# Patient Record
Sex: FEMALE | Race: WHITE | NOT HISPANIC OR LATINO | Employment: PART TIME | URBAN - METROPOLITAN AREA
[De-identification: names, ages, dates, MRNs, and addresses within clinical notes are randomized per-mention and may not be internally consistent; named-entity substitution may affect disease eponyms.]

---

## 2017-07-14 ENCOUNTER — OFFICE VISIT (OUTPATIENT)
Dept: MEDICAL GROUP | Facility: MEDICAL CENTER | Age: 57
End: 2017-07-14
Attending: INTERNAL MEDICINE
Payer: MEDICAID

## 2017-07-14 VITALS
DIASTOLIC BLOOD PRESSURE: 88 MMHG | TEMPERATURE: 97.9 F | WEIGHT: 248 LBS | HEIGHT: 66 IN | BODY MASS INDEX: 39.86 KG/M2 | RESPIRATION RATE: 16 BRPM | OXYGEN SATURATION: 92 % | SYSTOLIC BLOOD PRESSURE: 128 MMHG | HEART RATE: 80 BPM

## 2017-07-14 DIAGNOSIS — Z12.31 SCREENING MAMMOGRAM, ENCOUNTER FOR: ICD-10-CM

## 2017-07-14 DIAGNOSIS — E66.01 MORBID OBESITY WITH BMI OF 40.0-44.9, ADULT (HCC): ICD-10-CM

## 2017-07-14 DIAGNOSIS — Z13.1 SCREENING FOR DIABETES MELLITUS: ICD-10-CM

## 2017-07-14 DIAGNOSIS — Z13.220 SCREENING, LIPID: ICD-10-CM

## 2017-07-14 DIAGNOSIS — Z23 NEED FOR VACCINATION: ICD-10-CM

## 2017-07-14 DIAGNOSIS — F12.90 MARIJUANA USE: ICD-10-CM

## 2017-07-14 DIAGNOSIS — R06.83 SNORING: ICD-10-CM

## 2017-07-14 DIAGNOSIS — Z12.11 SCREEN FOR COLON CANCER: ICD-10-CM

## 2017-07-14 DIAGNOSIS — F33.1 MODERATE EPISODE OF RECURRENT MAJOR DEPRESSIVE DISORDER (HCC): ICD-10-CM

## 2017-07-14 PROCEDURE — 99214 OFFICE O/P EST MOD 30 MIN: CPT | Performed by: INTERNAL MEDICINE

## 2017-07-14 PROCEDURE — 99204 OFFICE O/P NEW MOD 45 MIN: CPT | Performed by: INTERNAL MEDICINE

## 2017-07-14 ASSESSMENT — PATIENT HEALTH QUESTIONNAIRE - PHQ9: CLINICAL INTERPRETATION OF PHQ2 SCORE: 0

## 2017-07-14 ASSESSMENT — PAIN SCALES - GENERAL: PAINLEVEL: 2=MINIMAL-SLIGHT

## 2017-07-14 NOTE — MR AVS SNAPSHOT
"        Laverne Gillespiey   2017 8:30 AM   Office Visit   MRN: [id number]    Department:  [facility]   Dept Phone:  [phone]    Description:  Female : 1960   Provider:  My Escalante M.D.           Reason for Visit     Establish Care refered over by bariatrics      Allergies as of 2017     No Known Allergies      You were diagnosed with     Morbid obesity with BMI of 40.0-44.9, adult (CMS-HCC)   [658275]       Snoring   [500993]       Need for vaccination   [201934]       Marijuana use   [859541]       Moderate episode of recurrent major depressive disorder (CMS-HCC)   [8275238]       Screening for diabetes mellitus   [V77.1.ICD-9-CM]       Screening, lipid   [071266]       Screening mammogram, encounter for   [2496942]       Screen for colon cancer   [038739]         Vital Signs     Blood Pressure Pulse Temperature Respirations Height Weight    128/88 mmHg 80 36.6 °C (97.9 °F) 16 1.676 m (5' 5.98\") 112.492 kg (248 lb)    Body Mass Index Oxygen Saturation Breastfeeding? Smoking Status          40.05 kg/m2 92% No Former Smoker        Basic Information     Date Of Birth Sex Race Ethnicity Preferred Language    1960 Female White Non- English      Your appointments     Aug 17, 2017  9:10 AM   Established Patient with My Escalante M.D.   [facility] ([facility])    [address] [place] [id number]   [phone]           You will be receiving a confirmation call a few days before your appointment from our automated call confirmation system.              Problem List              ICD-10-CM Priority Class Noted - Resolved    Morbid obesity with BMI of 40.0-44.9, adult ([facility]) E66.01, Z68.41   2017 - Present    Snoring R06.83   2017 - Present    Marijuana use F12.10   2017 - Present    Moderate episode of recurrent major depressive disorder (CMS-HCC) F33.1   2017 - Present      Health Maintenance        Date Due Completion Dates    IMM " DTaP/Tdap/Td Vaccine (1 - Tdap) 11/9/1981 ---    PAP SMEAR 11/9/1983 ---    MAMMOGRAM 11/9/2002 ---    COLONOSCOPY 11/9/2012 ---    IMM INFLUENZA (1) 9/1/2017 ---            Current Immunizations     Tdap Vaccine  Incomplete      Below and/or attached are the medications your provider expects you to take. Review all of your home medications and newly ordered medications with your provider and/or pharmacist. Follow medication instructions as directed by your provider and/or pharmacist. Please keep your medication list with you and share with your provider. Update the information when medications are discontinued, doses are changed, or new medications (including over-the-counter products) are added; and carry medication information at all times in the event of emergency situations     Allergies:  No Known Allergies          Medications  Valid as of: July 14, 2017 -  9:22 AM    Generic Name Brand Name Tablet Size Instructions for use    Sertraline HCl (Tab) ZOLOFT 50 MG Take 1/2 tablet daily for one week then increase to one tablet daily        .                 Medicines prescribed today were sent to:     [facility] [address] [place] [address] (S), [place] - [address]    [facility] [place] ([place]) [place] [address]    Phone: [phone] Fax: [phone]    Open 24 Hours?: No      Medication refill instructions:       If your prescription bottle indicates you have medication refills left, it is not necessary to call your provider’s office. Please contact your pharmacy and they will refill your medication.    If your prescription bottle indicates you do not have any refills left, you may request refills at any time through one of the following ways: The online [employer] Advisor system (except Urgent Care), by calling your provider’s office, or by asking your pharmacy to contact your provider’s office with a refill request. Medication refills are processed only during regular business hours and may not be available until the next business day.  Your provider may request additional information or to have a follow-up visit with you prior to refilling your medication.   *Please Note: Medication refills are assigned a new Rx number when refilled electronically. Your pharmacy may indicate that no refills were authorized even though a new prescription for the same medication is available at the pharmacy. Please request the medicine by name with the pharmacy before contacting your provider for a refill.        Your To Do List     Future Labs/Procedures Complete By Expires    COMP METABOLIC PANEL  As directed 7/15/2018    HEMOGLOBIN A1C  As directed 7/15/2018    LIPID PROFILE  As directed 7/15/2018    MA-SCREEN MAMMO W/CAD-BILAT  As directed 7/14/2018    OCCULT BLOOD FECES IMMUNOASSAY  As directed 7/14/2018      Referral     A referral request has been sent to our patient care coordination department. Please allow 3-5 business days for us to process this request and contact you either by phone or mail. If you do not hear from us by the 5th business day, please call us at [phone].           Narrable Access Code: [email or web address]  Expires: 8/10/2017 10:25 AM    Narrable  A secure, online tool to manage your health information     [employer]’s Narrable® is a secure, online tool that connects you to your personalized health information from the privacy of your home -- day or night - making it very easy for you to manage your healthcare. Once the activation process is completed, you can even access your medical information using the Narrable landon, which is available for free in the Apple Landon store or Google Play store.     Narrable provides the following levels of access (as shown below):   My Chart Features   [facility] Doctor [facility]  Specialists [facility]  Urgent  Care Non-[facility]  Primary Care  Doctor   Email your healthcare team securely and privately 24/7 X X X    Manage appointments: schedule your next appointment; view details of past/upcoming  appointments X      Request prescription refills. X      View recent personal medical records, including lab and immunizations X X X X   View health record, including health history, allergies, medications X X X X   Read reports about your outpatient visits, procedures, consult and ER notes X X X X   See your discharge summary, which is a recap of your hospital and/or ER visit that includes your diagnosis, lab results, and care plan. X X       How to register for Pawzii:  1. Go to  [email or web address].  2. Click on the Sign Up Now box, which takes you to the New Member Sign Up page. You will need to provide the following information:  a. Enter your Pawzii Access Code exactly as it appears at the top of this page. (You will not need to use this code after you’ve completed the sign-up process. If you do not sign up before the expiration date, you must request a new code.)   b. Enter your date of birth.   c. Enter your home email address.   d. Click Submit, and follow the next screen’s instructions.  3. Create a Pawzii ID. This will be your Pawzii login ID and cannot be changed, so think of one that is secure and easy to remember.  4. Create a Pawzii password. You can change your password at any time.  5. Enter your Password Reset Question and Answer. This can be used at a later time if you forget your password.   6. Enter your e-mail address. This allows you to receive e-mail notifications when new information is available in Pawzii.  7. Click Sign Up. You can now view your health information.    For assistance activating your Pawzii account, call [phone]

## 2017-07-14 NOTE — ASSESSMENT & PLAN NOTE
Patient reports daily marijuana use by smoking and with adequate pulse. 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

## 2017-07-14 NOTE — ASSESSMENT & PLAN NOTE
Patient reports extensive snoring at night. She denies orthopnea. She reports waking up feeling unrefreshed and increased fatigue throughout the day. She also reports difficulty sleeping at night. She has occasional morning headaches. She has never had a sleep study, but her mother and her brother both have JANNY.

## 2017-07-14 NOTE — PROGRESS NOTES
Laverne Riojas is a 56 y.o. female here for depression, weight loss, evaluation for bariatric surgery, establish care    HPI:  No previous PCP  Morbid obesity with BMI of 40.0-44.9, adult ([facility])  Patient reports she is currently being seen by Dr. Kent for evaluation for gastric sleeve. She states she has struggled with weight her entire life. She is currently trying to lose weight by dieting but does report binge eating when she is feeling depressed. She has also tried human growth hormone which she was applying topically to help with weight loss. However, when she was using this, she started to develop breast tenderness and had a very light menstrual cycle when she had not had one in 3 years, so she stopped using it. She is not currently doing anything for exercise. States that she had issues with plantar fasciitis in the past which has prevented her from resuming any high impact exercise. We discussed cycling and pool exercises.    Moderate episode of recurrent major depressive disorder (CMS-[facility])  Patient reports suffering from depression for many years. She believes she was taking an antidepressant in 1998 but cannot remember what it was. She states she stopped taking it because she didn't feel she needed it anymore. She has not been treated for depression since then. She has a prescription in her chart for Lexapro from 2014 but states she never picked this up. She does not currently see a therapist. She complains of daily fatigue, insomnia, increased eating and binge eating related to depression, depressed mood, anhedonia, and crying easily. She denies suicidal ideation.    Snoring  Patient reports extensive snoring at night. She denies orthopnea. She reports waking up feeling unrefreshed and increased fatigue throughout the day. She also reports difficulty sleeping at night. She has occasional morning headaches. She has never had a sleep study, but her mother and her brother both have JANNY.    Marijuana  "use  Patient reports daily marijuana use by smoking and with adequate pulse. 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      Current medicines (including changes today)  Current Outpatient Prescriptions   Medication Sig Dispense Refill   • sertraline (ZOLOFT) 50 MG Tab Take 1/2 tablet daily for one week then increase to one tablet daily 30 Tab 1     No current facility-administered medications for this visit.     She  has a past medical history of Depression and Obesity.  She  has past surgical history that includes tonsillectomy.  Social History   Substance Use Topics   • Smoking status: Former Smoker -- 0.25 packs/day for 25 years     Types: Cigarettes     Quit date: 07/14/2002   • Smokeless tobacco: Never Used   • Alcohol Use: No     Social History     Social History Narrative     Family History   Problem Relation Age of Onset   • Arthritis Mother    • Stroke Mother    • Sleep Apnea Mother    • Sleep Apnea Brother    • Cancer Maternal Uncle      ROS  As above in history of present illness  All other systems reviewed and are negative     Objective:     Blood pressure 128/88, pulse 80, temperature 36.6 °C (97.9 °F), resp. rate 16, height 1.676 m (5' 5.98\"), weight 112.492 kg (248 lb), SpO2 92 %, not currently breastfeeding. Body mass index is 40.05 kg/(m^2).  Physical Exam:    Constitutional: Alert, no distress.  Skin: Warm, dry, good turgor, no rashes in visible areas.  Eye: Equal, round and reactive, conjunctiva clear, lids normal.  ENMT: Lips without lesions, good dentition, oropharynx clear.  Neck: Trachea midline, no masses, no thyromegaly. No cervical or supraclavicular lymphadenopathy.  Respiratory: Unlabored respiratory effort, lungs clear to auscultation, no wheezes, no ronchi.  Cardiovascular: Normal S1, S2, no murmur, no edema.  Abdomen: Soft, obese, non-tender, no masses, no " hepatosplenomegaly.  Psych: Alert and oriented x3, normal affect and mood is depressed.      Assessment and Plan:   The following treatment plan was discussed    1. Morbid obesity with BMI of 40.0-44.9, adult (CMS-HCC)  Patient is currently seeing Dr. Kent and is being considered for a gastric sleeve. We discussed continued healthy diet as well as restarting an exercise program which would include swimming or cycling so that she does not put a lot of pressure on her feet which are still painful from her plantar fasciitis. She will continue to follow-up with Dr. Kent regarding the specific qualifications she needs for bariatric surgery.  -Recommended start low impact exercise regimen  -Continued follow-up with bariatric surgery  - Patient identified as having weight management issue.  Appropriate orders and counseling given.  - COMP METABOLIC PANEL; Future    2. Snoring  Patient has some features characteristic of obstructive sleep apnea as well as obesity with increased neck circumference. I have placed a referral today for a sleep study for further diagnosis.  - REFERRAL TO SLEEP STUDIES    3. Marijuana use  Daily marijuana use by both smoking and edibles.  Patient is not interested in quitting at this time.    4. Moderate episode of recurrent major depressive disorder (CMS-HCC)  Patient is interested in trying both the medication and therapy. We will start her on Zoloft and psychology referral placed today. Follow-up in 4 weeks.  -Start Zoloft 50 mg one half tablet daily for one week then increase to one full tablet daily  - REFERRAL TO PSYCHOLOGY  -Follow up in 4 weeks    5. Need for vaccination  - Tdap =>8yo IM    6. Screening for diabetes mellitus  - HEMOGLOBIN A1C; Future    7. Screening, lipid  - LIPID PROFILE; Future    8. Screening mammogram, encounter for  - MA-SCREEN MAMMO W/CAD-BILAT; Future    9. Screen for colon cancer  - OCCULT BLOOD FECES IMMUNOASSAY; Future        Followup: Return in about 4 weeks  (around 8/11/2017) for depression, labs.

## 2017-07-14 NOTE — ASSESSMENT & PLAN NOTE
Patient reports suffering from depression for many years. She believes she was taking an antidepressant in 1998 but cannot remember what it was. She states she stopped taking it because she didn't feel she needed it anymore. She has not been treated for depression since then. She has a prescription in her chart for Lexapro from 2014 but states she never picked this up. She does not currently see a therapist. She complains of daily fatigue, insomnia, increased eating and binge eating related to depression, depressed mood, anhedonia, and crying easily. She denies suicidal ideation.

## 2017-07-18 ENCOUNTER — HOSPITAL ENCOUNTER (OUTPATIENT)
Facility: MEDICAL CENTER | Age: 57
End: 2017-07-18
Attending: INTERNAL MEDICINE
Payer: MEDICAID

## 2017-07-18 PROCEDURE — 82274 ASSAY TEST FOR BLOOD FECAL: CPT

## 2017-07-20 ENCOUNTER — HOSPITAL ENCOUNTER (OUTPATIENT)
Dept: RADIOLOGY | Facility: MEDICAL CENTER | Age: 57
End: 2017-07-20
Attending: INTERNAL MEDICINE
Payer: MEDICAID

## 2017-07-20 DIAGNOSIS — Z12.31 SCREENING MAMMOGRAM, ENCOUNTER FOR: ICD-10-CM

## 2017-07-20 LAB — HEMOCCULT STL QL IA: NEGATIVE

## 2017-07-20 PROCEDURE — G0202 SCR MAMMO BI INCL CAD: HCPCS

## 2017-07-21 ENCOUNTER — TELEPHONE (OUTPATIENT)
Dept: MEDICAL GROUP | Facility: MEDICAL CENTER | Age: 57
End: 2017-07-21

## 2018-08-14 ENCOUNTER — OFFICE VISIT (OUTPATIENT)
Dept: URGENT CARE | Facility: CLINIC | Age: 58
End: 2018-08-14

## 2018-08-14 VITALS
RESPIRATION RATE: 16 BRPM | HEIGHT: 65 IN | HEART RATE: 68 BPM | TEMPERATURE: 97.5 F | BODY MASS INDEX: 41.32 KG/M2 | OXYGEN SATURATION: 93 % | WEIGHT: 248 LBS | SYSTOLIC BLOOD PRESSURE: 110 MMHG | DIASTOLIC BLOOD PRESSURE: 80 MMHG

## 2018-08-14 DIAGNOSIS — D36.7 DERMOID CYST OF NOSE: ICD-10-CM

## 2018-08-14 PROCEDURE — 99203 OFFICE O/P NEW LOW 30 MIN: CPT | Performed by: NURSE PRACTITIONER

## 2018-08-14 RX ORDER — CEPHALEXIN 500 MG/1
500 CAPSULE ORAL 4 TIMES DAILY
COMMUNITY
End: 2019-06-12

## 2018-08-14 NOTE — PROGRESS NOTES
"Subjective:      Laverne Riojas is a 57 y.o. female who presents with Wound Infection (staph infection in nose, started taking amoxicillan 500 mg  antibiotics on sunday, but she fills it's not helping, got prescribed cephalexin took first dose this morning, needs doctors note for work )            This is a new problem. Pt reports she developed a cyst inside her left nare 4 days ago. She reports she was started on keflex from a  in [place]. She has been taking the medication for about 1.5 days but does not feel a lot of improvement. She wants to make sure this is the proper antibiotic. She denies any fever. Reports a small amount of drainage from the area.        Review of Systems   HENT:        Cyst in left nare   All other systems reviewed and are negative.    Past Medical History:   Diagnosis Date   • Depression    • Obesity       Past Surgical History:   Procedure Laterality Date   • TONSILLECTOMY        Social History     Social History   • Marital status: Single     Spouse name: N/A   • Number of children: N/A   • Years of education: N/A     Occupational History   • Not on file.     Social History Main Topics   • Smoking status: Former Smoker     Packs/day: 0.25     Years: 25.00     Types: Cigarettes     Quit date: 7/14/2002   • Smokeless tobacco: Never Used   • Alcohol use No   • Drug use: Yes     Types: Marijuana      Comment: smoking and edibles daily for pain   • Sexual activity: Not Currently     Partners: Male     Birth control/ protection: Post-Menopausal     Other Topics Concern   • Not on file     Social History Narrative   • No narrative on file          Objective:     /80   Pulse 68   Temp 36.4 °C (97.5 °F)   Resp 16   Ht 1.651 m (5' 5\")   Wt 112.5 kg (248 lb)   SpO2 93%   BMI 41.27 kg/m²      Physical Exam   Constitutional: She is oriented to person, place, and time. Vital signs are normal. She appears well-developed and well-nourished.   HENT:   Head: Normocephalic and atraumatic.  "   Nose:       Eyes: Pupils are equal, round, and reactive to light. EOM are normal.   Neck: Normal range of motion.   Cardiovascular: Normal rate and regular rhythm.    Pulmonary/Chest: Effort normal.   Musculoskeletal: Normal range of motion.   Neurological: She is alert and oriented to person, place, and time.   Skin: Skin is warm and dry. Capillary refill takes less than 2 seconds.   Psychiatric: She has a normal mood and affect. Her speech is normal and behavior is normal. Thought content normal.   Vitals reviewed.              Assessment/Plan:     1. Dermoid cyst of nose    Advised pt she is on the correct ABX. Keflex 500 mg QID x 10 days  In about 2 days she should start seeing significant improvement in the area, if she does not, she is advised to RTC for potentially a stronger antibiotic  She understands  Warm moist compresses TID  Tylenol and ibuprofen as needed for pain  Work note provided  Supportive care, differential diagnoses, and indications for immediate follow-up discussed with patient.    Pathogenesis of diagnosis discussed including typical length and natural progression.      Instructed to return to  or nearest emergency department if symptoms fail to improve, for any change in condition, further concerns, or new concerning symptoms.  Patient states understanding of the plan of care and discharge instructions.

## 2018-08-14 NOTE — LETTER
August 14, 2018         Patient: Laverne Riojas   YOB: 1960   Date of Visit: 8/14/2018           To Whom it May Concern:    Laverne Riojas was seen in my clinic on 8/14/2018. Please excuse her from work 8/14/18-8/15/18.      Sincerely,           FRANCOIS Santillan.  Electronically Signed

## 2019-06-12 ENCOUNTER — OFFICE VISIT (OUTPATIENT)
Dept: URGENT CARE | Facility: CLINIC | Age: 59
End: 2019-06-12
Payer: COMMERCIAL

## 2019-06-12 VITALS
HEIGHT: 65 IN | WEIGHT: 224 LBS | OXYGEN SATURATION: 94 % | BODY MASS INDEX: 37.32 KG/M2 | SYSTOLIC BLOOD PRESSURE: 118 MMHG | TEMPERATURE: 98.4 F | HEART RATE: 70 BPM | DIASTOLIC BLOOD PRESSURE: 68 MMHG | RESPIRATION RATE: 18 BRPM
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
## 2019-06-12 DIAGNOSIS — M43.6 ACUTE MUSCLE STIFFNESS OF NECK: ICD-10-CM

## 2019-06-12 PROCEDURE — 99214 OFFICE O/P EST MOD 30 MIN: CPT | Performed by: PHYSICIAN ASSISTANT

## 2019-06-12 RX ORDER — CYCLOBENZAPRINE HCL 5 MG
5-10 TABLET ORAL 3 TIMES DAILY PRN
Qty: 30 TAB | Refills: 0 | Status: SHIPPED | OUTPATIENT
Start: 2019-06-12 | End: 2020-08-20

## 2019-06-12 ASSESSMENT — ENCOUNTER SYMPTOMS
CHILLS: 0
NECK PAIN: 1
DIZZINESS: 0
VISUAL CHANGE: 0
FEVER: 0
SHORTNESS OF BREATH: 0
ABDOMINAL PAIN: 0
NAUSEA: 0
DIARRHEA: 0
VOMITING: 0
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
## 2020-01-07 ENCOUNTER — OFFICE VISIT (OUTPATIENT)
Dept: URGENT CARE | Facility: CLINIC | Age: 60
End: 2020-01-07
Payer: COMMERCIAL

## 2020-01-07 VITALS
DIASTOLIC BLOOD PRESSURE: 84 MMHG | OXYGEN SATURATION: 94 % | WEIGHT: 220 LBS | TEMPERATURE: 99.3 F | BODY MASS INDEX: 36.61 KG/M2 | RESPIRATION RATE: 16 BRPM | HEART RATE: 74 BPM | SYSTOLIC BLOOD PRESSURE: 132 MMHG

## 2020-01-07 DIAGNOSIS — J01.00 ACUTE MAXILLARY SINUSITIS, RECURRENCE NOT SPECIFIED: ICD-10-CM

## 2020-01-07 PROCEDURE — 99204 OFFICE O/P NEW MOD 45 MIN: CPT | Performed by: FAMILY MEDICINE

## 2020-01-07 RX ORDER — AMOXICILLIN AND CLAVULANATE POTASSIUM 875; 125 MG/1; MG/1
1 TABLET, FILM COATED ORAL 2 TIMES DAILY
Qty: 14 TAB | Refills: 0 | Status: SHIPPED | OUTPATIENT
Start: 2020-01-07 | End: 2020-01-14

## 2020-05-20 ENCOUNTER — HOSPITAL ENCOUNTER (OUTPATIENT)
Facility: MEDICAL CENTER | Age: 60
End: 2020-05-20
Payer: COMMERCIAL

## 2020-05-23 LAB
SARS-COV-2 RNA SPEC QL NAA+PROBE: NOT DETECTED
SPECIMEN SOURCE: NORMAL

## 2020-08-20 ENCOUNTER — HOSPITAL ENCOUNTER (EMERGENCY)
Facility: MEDICAL CENTER | Age: 60
End: 2020-08-20
Attending: EMERGENCY MEDICINE
Payer: COMMERCIAL

## 2020-08-20 ENCOUNTER — APPOINTMENT (OUTPATIENT)
Dept: RADIOLOGY | Facility: MEDICAL CENTER | Age: 60
End: 2020-08-20
Attending: EMERGENCY MEDICINE
Payer: COMMERCIAL

## 2020-08-20 VITALS
OXYGEN SATURATION: 98 % | SYSTOLIC BLOOD PRESSURE: 117 MMHG | HEART RATE: 73 BPM | BODY MASS INDEX: 37.82 KG/M2 | DIASTOLIC BLOOD PRESSURE: 59 MMHG | WEIGHT: 227 LBS | HEIGHT: 65 IN | RESPIRATION RATE: 16 BRPM | TEMPERATURE: 97.5 F

## 2020-08-20 DIAGNOSIS — R55 VASOVAGAL SYNCOPE: ICD-10-CM

## 2020-08-20 DIAGNOSIS — S93.402A SPRAIN OF LEFT ANKLE, UNSPECIFIED LIGAMENT, INITIAL ENCOUNTER: ICD-10-CM

## 2020-08-20 LAB
ALBUMIN SERPL BCP-MCNC: 4 G/DL (ref 3.2–4.9)
ALBUMIN/GLOB SERPL: 1.6 G/DL
ALP SERPL-CCNC: 50 U/L (ref 30–99)
ALT SERPL-CCNC: 16 U/L (ref 2–50)
ANION GAP SERPL CALC-SCNC: 11 MMOL/L (ref 7–16)
APTT PPP: 21.4 SEC (ref 24.7–36)
AST SERPL-CCNC: 14 U/L (ref 12–45)
BASOPHILS # BLD AUTO: 0.5 % (ref 0–1.8)
BASOPHILS # BLD: 0.05 K/UL (ref 0–0.12)
BILIRUB SERPL-MCNC: 0.2 MG/DL (ref 0.1–1.5)
BUN SERPL-MCNC: 13 MG/DL (ref 8–22)
CALCIUM SERPL-MCNC: 9.4 MG/DL (ref 8.5–10.5)
CHLORIDE SERPL-SCNC: 105 MMOL/L (ref 96–112)
CO2 SERPL-SCNC: 24 MMOL/L (ref 20–33)
CREAT SERPL-MCNC: 0.99 MG/DL (ref 0.5–1.4)
EKG IMPRESSION: NORMAL
EOSINOPHIL # BLD AUTO: 0.12 K/UL (ref 0–0.51)
EOSINOPHIL NFR BLD: 1.2 % (ref 0–6.9)
ERYTHROCYTE [DISTWIDTH] IN BLOOD BY AUTOMATED COUNT: 42.5 FL (ref 35.9–50)
GLOBULIN SER CALC-MCNC: 2.5 G/DL (ref 1.9–3.5)
GLUCOSE SERPL-MCNC: 100 MG/DL (ref 65–99)
HCT VFR BLD AUTO: 40.2 % (ref 37–47)
HGB BLD-MCNC: 13.1 G/DL (ref 12–16)
IMM GRANULOCYTES # BLD AUTO: 0.03 K/UL (ref 0–0.11)
IMM GRANULOCYTES NFR BLD AUTO: 0.3 % (ref 0–0.9)
INR PPP: 0.96 (ref 0.87–1.13)
LYMPHOCYTES # BLD AUTO: 2.1 K/UL (ref 1–4.8)
LYMPHOCYTES NFR BLD: 20.7 % (ref 22–41)
MCH RBC QN AUTO: 29.8 PG (ref 27–33)
MCHC RBC AUTO-ENTMCNC: 32.6 G/DL (ref 33.6–35)
MCV RBC AUTO: 91.4 FL (ref 81.4–97.8)
MONOCYTES # BLD AUTO: 0.49 K/UL (ref 0–0.85)
MONOCYTES NFR BLD AUTO: 4.8 % (ref 0–13.4)
NEUTROPHILS # BLD AUTO: 7.36 K/UL (ref 2–7.15)
NEUTROPHILS NFR BLD: 72.5 % (ref 44–72)
NRBC # BLD AUTO: 0 K/UL
NRBC BLD-RTO: 0 /100 WBC
PLATELET # BLD AUTO: 245 K/UL (ref 164–446)
PMV BLD AUTO: 11 FL (ref 9–12.9)
POTASSIUM SERPL-SCNC: 4.5 MMOL/L (ref 3.6–5.5)
PROT SERPL-MCNC: 6.5 G/DL (ref 6–8.2)
PROTHROMBIN TIME: 13.1 SEC (ref 12–14.6)
RBC # BLD AUTO: 4.4 M/UL (ref 4.2–5.4)
SODIUM SERPL-SCNC: 140 MMOL/L (ref 135–145)
TROPONIN T SERPL-MCNC: <6 NG/L (ref 6–19)
WBC # BLD AUTO: 10.2 K/UL (ref 4.8–10.8)

## 2020-08-20 PROCEDURE — 93005 ELECTROCARDIOGRAM TRACING: CPT

## 2020-08-20 PROCEDURE — 73610 X-RAY EXAM OF ANKLE: CPT | Mod: LT

## 2020-08-20 PROCEDURE — 80053 COMPREHEN METABOLIC PANEL: CPT

## 2020-08-20 PROCEDURE — 85730 THROMBOPLASTIN TIME PARTIAL: CPT

## 2020-08-20 PROCEDURE — 93005 ELECTROCARDIOGRAM TRACING: CPT | Performed by: EMERGENCY MEDICINE

## 2020-08-20 PROCEDURE — 94760 N-INVAS EAR/PLS OXIMETRY 1: CPT

## 2020-08-20 PROCEDURE — 85025 COMPLETE CBC W/AUTO DIFF WBC: CPT

## 2020-08-20 PROCEDURE — 71045 X-RAY EXAM CHEST 1 VIEW: CPT

## 2020-08-20 PROCEDURE — 84484 ASSAY OF TROPONIN QUANT: CPT

## 2020-08-20 PROCEDURE — 99285 EMERGENCY DEPT VISIT HI MDM: CPT

## 2020-08-20 PROCEDURE — 85610 PROTHROMBIN TIME: CPT

## 2020-08-20 NOTE — Clinical Note
Laverne Riojas was seen and treated in our emergency department on 8/20/2020.  She may return to work on 08/24/2020.       If you have any questions or concerns, please don't hesitate to call.      Tasneem Ramirez M.D.

## 2020-08-21 NOTE — ED TRIAGE NOTES
"Chief Complaint   Patient presents with   • GLF     Pt tripped and rolled her L ankle.    • Syncope     After ankle injury pt had a syncopal event hitting her head on a rock on the L side. Denies thinners or ASA.   • Shoulder Pain     L side.     /58   Pulse 73   Temp 36.7 °C (98 °F) (Temporal)   Resp 18   Ht 1.651 m (5' 5\")   Wt 103 kg (227 lb)   SpO2 98%   Breastfeeding No   BMI 37.77 kg/m²     Pt brought in by [facility], PIV placed - 500 mL NS and  pta. EKG ordered. Placed in room GR 29. Complaints and vitals as above. Pt on monitors, all alarms audible. Chart flagged for ERP to see.  "

## 2020-08-21 NOTE — ED NOTES
Report received from day shift RN. Patient resting in bed. RN sent labs. Xray completed. No additional needs at this time. Call light within reach.

## 2020-08-21 NOTE — ED NOTES
Discharge summary completed with patient. PIV removed. Patient education completed regarding follow up care, and new medications. Patient refusing crutches at this time. ED tech at bedside for boot application. Patient wheeled out to lobby with ED tech and . All belongings sent home with patient.

## 2020-08-21 NOTE — ED PROVIDER NOTES
ED Provider Note    Scribed for Tasneem Ramirez M.D. by Kiko Horvath. 8/20/2020  6:52 PM    Primary care provider: My Escalante M.D.  Means of arrival: EMS  History obtained from: patient  History limited by: none    CHIEF COMPLAINT  Chief Complaint   Patient presents with   • GLF     Pt tripped and rolled her L ankle.    • Syncope     After ankle injury pt had a syncopal event hitting her head on a rock on the L side. Denies thinners or ASA.   • Shoulder Pain     L side.       PPE Note: I personally donned full PPE for all patient encounters during this visit, including wearing an N95 respirator mask, gloves, and eye protection. Scribe remained outside the patient's room and did not have any contact with the patient for the duration of patient encounter.      JASBIR Riojas is a 59 y.o. female who presents to the Emergency Department with complaints of a syncopal episode shortly prior to arrival. She states that she was walking along the river when she rolled her ankle. While trying to walk back up the hill, she lost consciousness and hit her head. She states she felt lightheaded and developed tunnel vision. She is not currently anticoagulated. She has pain and bruising to her left hand and left posterior head. She denies any abdominal pain. She denies any chest pain or shortness of breath. No cardiac issues. No chronic medical conditions requiring medications. She does not smoke cigarettes.      REVIEW OF SYSTEMS  HEENT: Left posterior head pain No ear pain, congestion, or sore throat   EYES: no discharge, redness, or vision changes  CARDIAC: no chest pain, no palpitations    PULMONARY: no dyspnea, cough, or congestion   GI: no vomiting, diarrhea, or abdominal pain   : no dysuria, back pain, or hematuria   Neuro: Syncope. no weakness, numbness, aphasia, or headache  Musculoskeletal: Left ankle pain and left hand soreness.  Endocrine: no fevers, sweating, or weight loss   SKIN: no rash, erythema,  "or contusions     See history of present illness. All other systems are negative. C.    PAST MEDICAL HISTORY   has a past medical history of Depression and Obesity.    SURGICAL HISTORY   has a past surgical history that includes tonsillectomy.    SOCIAL HISTORY  Social History     Tobacco Use   • Smoking status: Former Smoker     Packs/day: 0.25     Years: 25.00     Pack years: 6.25     Types: Cigarettes     Quit date: 2002     Years since quittin.1   • Smokeless tobacco: Never Used   Substance Use Topics   • Alcohol use: Yes     Comment: occ   • Drug use: Yes     Types: Marijuana     Comment: smoking and edibles daily for pain      Social History     Substance and Sexual Activity   Drug Use Yes   • Types: Marijuana    Comment: smoking and edibles daily for pain       FAMILY HISTORY  Family History   Problem Relation Age of Onset   • Arthritis Mother    • Stroke Mother    • Sleep Apnea Mother    • Sleep Apnea Brother    • Cancer Maternal Uncle        CURRENT MEDICATIONS  Home Medications     Reviewed by Che Grande R.N. (Registered Nurse) on 20 at 1834  Med List Status: Complete   Medication Last Dose Status        Patient Cesar Taking any Medications                       ALLERGIES  No Known Allergies    PHYSICAL EXAM  VITAL SIGNS: /58   Pulse 73   Temp 36.7 °C (98 °F) (Temporal)   Resp 18   Ht 1.651 m (5' 5\")   Wt 103 kg (227 lb)   SpO2 98%   Breastfeeding No   BMI 37.77 kg/m²     Constitutional: Well developed, Well nourished, No acute distress, Non-toxic appearance.   HEENT: Normocephalic, Bruising to the left scalp, external ears normal, pharynx pink,  Mucous  Membranes moist, No rhinorrhea or mucosal edema  Eyes: PERRL, EOMI, Conjunctiva normal, No discharge.   Neck: Normal range of motion, No tenderness, Supple, No stridor, No stepoff. No C-spine tenderness.  Lymphatic: No lymphadenopathy    Cardiovascular: Regular Rate and Rhythm, No murmurs,  rubs, or gallops.   Thorax & " Lungs: Lungs clear to auscultation bilaterally, No respiratory distress, No wheezes, rhales or rhonchi, No chest wall tenderness.   Abdomen: Bowel sounds normal, obese, soft, non tender, non distended,  No pulsatile masses., no rebound guarding or peritoneal signs.   Skin: Warm, Dry, No erythema, No rash,   Back:  No CVA tenderness,  No T/L spine tenderness, bony crepitance, step offs, or instability.   Neurologic: Alert & oriented x 3, Normal motor function, Normal sensory function, No focal deficits noted. Normal reflexes. Normal Cranial Nerves.  Extremities: Contusions with swelling over the left ankle over the lateral malleolus. Normal sensation distally. Decreased range of motion secondary to swelling. Small bruise over the dorsal aspect of the left hand. Normal range of motion of left hand.   Musculoskeletal: Good range of motion in all major joints. Hips and pelvis are stable. No tenderness to palpation or major deformities noted.     DIAGNOSTIC STUDIES / PROCEDURES    LABS  Results for orders placed or performed during the hospital encounter of 08/20/20   CBC WITH DIFFERENTIAL   Result Value Ref Range    WBC 10.2 4.8 - 10.8 K/uL    RBC 4.40 4.20 - 5.40 M/uL    Hemoglobin 13.1 12.0 - 16.0 g/dL    Hematocrit 40.2 37.0 - 47.0 %    MCV 91.4 81.4 - 97.8 fL    MCH 29.8 27.0 - 33.0 pg    MCHC 32.6 (L) 33.6 - 35.0 g/dL    RDW 42.5 35.9 - 50.0 fL    Platelet Count 245 164 - 446 K/uL    MPV 11.0 9.0 - 12.9 fL    Neutrophils-Polys 72.50 (H) 44.00 - 72.00 %    Lymphocytes 20.70 (L) 22.00 - 41.00 %    Monocytes 4.80 0.00 - 13.40 %    Eosinophils 1.20 0.00 - 6.90 %    Basophils 0.50 0.00 - 1.80 %    Immature Granulocytes 0.30 0.00 - 0.90 %    Nucleated RBC 0.00 /100 WBC    Neutrophils (Absolute) 7.36 (H) 2.00 - 7.15 K/uL    Lymphs (Absolute) 2.10 1.00 - 4.80 K/uL    Monos (Absolute) 0.49 0.00 - 0.85 K/uL    Eos (Absolute) 0.12 0.00 - 0.51 K/uL    Baso (Absolute) 0.05 0.00 - 0.12 K/uL    Immature Granulocytes (abs) 0.03  0.00 - 0.11 K/uL    NRBC (Absolute) 0.00 K/uL   COMP METABOLIC PANEL   Result Value Ref Range    Sodium 140 135 - 145 mmol/L    Potassium 4.5 3.6 - 5.5 mmol/L    Chloride 105 96 - 112 mmol/L    Co2 24 20 - 33 mmol/L    Anion Gap 11.0 7.0 - 16.0    Glucose 100 (H) 65 - 99 mg/dL    Bun 13 8 - 22 mg/dL    Creatinine 0.99 0.50 - 1.40 mg/dL    Calcium 9.4 8.5 - 10.5 mg/dL    AST(SGOT) 14 12 - 45 U/L    ALT(SGPT) 16 2 - 50 U/L    Alkaline Phosphatase 50 30 - 99 U/L    Total Bilirubin 0.2 0.1 - 1.5 mg/dL    Albumin 4.0 3.2 - 4.9 g/dL    Total Protein 6.5 6.0 - 8.2 g/dL    Globulin 2.5 1.9 - 3.5 g/dL    A-G Ratio 1.6 g/dL   TROPONIN   Result Value Ref Range    Troponin T <6 6 - 19 ng/L   PRTOTHROMBIN TIME (INR)   Result Value Ref Range    PT 13.1 12.0 - 14.6 sec    INR 0.96 0.87 - 1.13   APTT   Result Value Ref Range    APTT 21.4 (L) 24.7 - 36.0 sec   ESTIMATED GFR   Result Value Ref Range    GFR If African American >60 >60 mL/min/1.73 m 2    GFR If Non African American 58 (A) >60 mL/min/1.73 m 2   EKG   Result Value Ref Range    Report       [facility] Emergency Dept.    Test Date:  2020  Pt Name:    KAPIL PARKER               Department: ER  MRN:        [id number]                      Room:       [facility]  Gender:     Female                       Technician: [id number]  :        [id number]                   Requested By:ER TRIAGE PROTOCOL  Order #:    [id number]                    Reading MD: OBEY MAY MD    Measurements  Intervals                                Axis  Rate:       72                           P:          59  IN:         204                          QRS:        74  QRSD:       90                           T:          40  QT:         400  QTc:        438    Interpretive Statements  SINUS RHYTHM  BORDERLINE AV CONDUCTION DELAY  No previous ECG available for comparison  Electronically Signed On 2020 20:19:18 PDT by OBEY MAY MD        All labs reviewed by me.    EKG  12 lead  EKG; Interpreted by emergency department physician as noted above.    RADIOLOGY  DX-CHEST-PORTABLE (1 VIEW)   Final Result      No acute cardiopulmonary abnormality.      DX-ANKLE 3+ VIEWS LEFT   Final Result      No acute osseous abnormality.        The radiologist's interpretation of all radiological studies have been reviewed by me.    COURSE & MEDICAL DECISION MAKING  Nursing notes, Lane WRIGHT reviewed in chart.    6:52 PM Patient seen and examined at bedside. Ordered xray imaging of the chest and left ankle as well as a CBC w/ diff, CMP, troponin, PT/INR, APTT, and EKG to evaluate her symptoms. The differential diagnoses include but are not limited to: electrolyte abnormality, anemia, ICH, arrhythmia, ankle fracture.     8:16 PM - Per RN, the patient declined to complete the head CT. I believe this is reasonable as the patient has not had any neurologic changes, vision changes, or nausea. Patient was reevaluated at bedside. Discussed lab and radiology results with the patient and informed them that there was no evidence of fracture. Discussed the plan for discharge including pain management with OTC analgesics. Patient was placed in a boot and provided with crutches. She was given a note for work. Advised to follow-up with orthopedics. Patient verbalizes understanding and agreement to this plan of care.      The patient will return for new or worsening symptoms and is stable at the time of discharge.    Patient has had high blood pressure while in the emergency department, felt likely secondary to medical condition. Counseled patient to monitor blood pressure at home and follow up with primary care physician.     DISPOSITION:  Patient will be discharged home in stable condition.    FOLLOW UP:  Efrain Babb M.D.  [address] [place] [address]  [phone]    Call in 1 day  to establish care, for recheck    FINAL IMPRESSION  1. Sprain of left ankle, unspecified ligament, initial encounter    2.  Vasovagal syncope          Kiko KINCAID (Scribe), am scribing for, and in the presence of, Tasneem Ramirez M.D..    Electronically signed by: Kiko Horvath (Scribe), 8/20/2020    Tasneem KINCAID M.D. personally performed the services described in this documentation, as scribed by Kiko Horvath in my presence, and it is both accurate and complete.    The note accurately reflects work and decisions made by me.  Tasneem Ramirez M.D.  8/20/2020  9:59 PM

## 2020-10-21 ENCOUNTER — APPOINTMENT (RX ONLY)
Dept: URBAN - METROPOLITAN AREA CLINIC 31 | Facility: CLINIC | Age: 60
Setting detail: DERMATOLOGY
End: 2020-10-21

## 2020-10-21 DIAGNOSIS — L71.8 OTHER ROSACEA: ICD-10-CM

## 2020-10-21 PROCEDURE — ? PRESCRIPTION

## 2020-10-21 PROCEDURE — ? TREATMENT REGIMEN

## 2020-10-21 PROCEDURE — ? COUNSELING

## 2020-10-21 PROCEDURE — 99202 OFFICE O/P NEW SF 15 MIN: CPT

## 2020-10-21 RX ORDER — METRONIDAZOLE 7.5 MG/G
CREAM TOPICAL BID
Qty: 1 | Refills: 4 | Status: ERX | COMMUNITY
Start: 2020-10-21

## 2020-10-21 RX ORDER — DOXYCYCLINE HYCLATE 100 MG/1
CAPSULE, GELATIN COATED ORAL
Qty: 60 | Refills: 4 | Status: ERX | COMMUNITY
Start: 2020-10-21

## 2020-10-21 RX ADMIN — DOXYCYCLINE HYCLATE: 100 CAPSULE, GELATIN COATED ORAL at 00:00

## 2020-10-21 RX ADMIN — METRONIDAZOLE: 7.5 CREAM TOPICAL at 00:00

## 2020-10-21 ASSESSMENT — LOCATION ZONE DERM: LOCATION ZONE: FACE

## 2020-10-21 ASSESSMENT — LOCATION DETAILED DESCRIPTION DERM
LOCATION DETAILED: RIGHT INFERIOR CENTRAL MALAR CHEEK
LOCATION DETAILED: LEFT INFERIOR CENTRAL MALAR CHEEK

## 2020-10-21 ASSESSMENT — LOCATION SIMPLE DESCRIPTION DERM
LOCATION SIMPLE: LEFT CHEEK
LOCATION SIMPLE: RIGHT CHEEK

## 2020-10-21 NOTE — HPI: RASH
How Severe Is Your Rash?: moderate
Is This A New Presentation, Or A Follow-Up?: Rash
Additional History: Pt reports this rash has been intermittently present for many years. She was seen 5 years ago and treated with doxycycline, which did control sx at that time. Pt is unsure whether current rash is same as previously treated rash.

## 2020-10-21 NOTE — PROCEDURE: TREATMENT REGIMEN
Detail Level: Zone
Initiate Treatment: Metronidazole BID.\\nDoxycycline 100mg twice daily for 1-2 months.  \\nGentle skin care regimen. Avoid use of toners and exfoliants. Pt may re-incorporate homemade products once sx are controlled, in order to determine any negative effects. \\nF/u in 2 months.

## 2020-11-23 ENCOUNTER — HOSPITAL ENCOUNTER (OUTPATIENT)
Dept: LAB | Facility: MEDICAL CENTER | Age: 60
End: 2020-11-23
Attending: PATHOLOGY
Payer: COMMERCIAL

## 2020-11-23 LAB — COVID ORDER STATUS COVID19: NORMAL

## 2020-11-24 LAB
SARS-COV-2 RNA RESP QL NAA+PROBE: DETECTED
SPECIMEN SOURCE: ABNORMAL

## 2021-03-15 DIAGNOSIS — Z23 NEED FOR VACCINATION: ICD-10-CM

## 2022-01-03 ENCOUNTER — HOSPITAL ENCOUNTER (EMERGENCY)
Facility: MEDICAL CENTER | Age: 62
End: 2022-01-03
Attending: EMERGENCY MEDICINE
Payer: COMMERCIAL

## 2022-01-03 VITALS
DIASTOLIC BLOOD PRESSURE: 80 MMHG | SYSTOLIC BLOOD PRESSURE: 142 MMHG | OXYGEN SATURATION: 98 % | BODY MASS INDEX: 17.41 KG/M2 | HEIGHT: 65 IN | WEIGHT: 104.5 LBS | TEMPERATURE: 97.4 F | RESPIRATION RATE: 18 BRPM | HEART RATE: 62 BPM

## 2022-01-03 DIAGNOSIS — Z87.828: ICD-10-CM
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
## 2022-01-03 PROCEDURE — 99284 EMERGENCY DEPT VISIT MOD MDM: CPT | Mod: EDC

## 2022-01-03 RX ORDER — IBUPROFEN 800 MG/1
800 TABLET ORAL EVERY 8 HOURS PRN
Qty: 30 TABLET | Refills: 0 | Status: SHIPPED | OUTPATIENT
Start: 2022-01-03 | End: 2022-01-28

## 2022-01-03 RX ORDER — CYCLOBENZAPRINE HCL 10 MG
10 TABLET ORAL 3 TIMES DAILY PRN
Qty: 20 TABLET | Refills: 0 | Status: SHIPPED | OUTPATIENT
Start: 2022-01-03 | End: 2022-01-28

## 2022-01-03 ASSESSMENT — FIBROSIS 4 INDEX: FIB4 SCORE: 0.84
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
## 2022-01-28 ENCOUNTER — OFFICE VISIT (OUTPATIENT)
Dept: INTERNAL MEDICINE | Facility: OTHER | Age: 62
End: 2022-01-28
Payer: COMMERCIAL

## 2022-01-28 ENCOUNTER — TELEPHONE (OUTPATIENT)
Dept: INTERNAL MEDICINE | Facility: OTHER | Age: 62
End: 2022-01-28

## 2022-01-28 VITALS
SYSTOLIC BLOOD PRESSURE: 127 MMHG | BODY MASS INDEX: 38.65 KG/M2 | DIASTOLIC BLOOD PRESSURE: 84 MMHG | TEMPERATURE: 97.4 F | HEIGHT: 65 IN | HEART RATE: 68 BPM | WEIGHT: 232 LBS | OXYGEN SATURATION: 96 %

## 2022-01-28 DIAGNOSIS — E66.9 OBESITY (BMI 30-39.9): ICD-10-CM

## 2022-01-28 DIAGNOSIS — Z12.31 ENCOUNTER FOR SCREENING MAMMOGRAM FOR MALIGNANT NEOPLASM OF BREAST: ICD-10-CM

## 2022-01-28 DIAGNOSIS — D18.01 HEMANGIOMA OF SKIN: ICD-10-CM

## 2022-01-28 DIAGNOSIS — Z12.12 SCREENING FOR COLORECTAL CANCER: ICD-10-CM

## 2022-01-28 DIAGNOSIS — Z13.228 SCREENING FOR METABOLIC DISORDER: ICD-10-CM

## 2022-01-28 DIAGNOSIS — Z00.00 HEALTHCARE MAINTENANCE: ICD-10-CM

## 2022-01-28 DIAGNOSIS — Z12.11 SCREENING FOR COLORECTAL CANCER: ICD-10-CM

## 2022-01-28 DIAGNOSIS — J45.20 MILD INTERMITTENT REACTIVE AIRWAY DISEASE WITHOUT COMPLICATION: ICD-10-CM

## 2022-01-28 DIAGNOSIS — M79.671 RIGHT FOOT PAIN: ICD-10-CM

## 2022-01-28 DIAGNOSIS — R06.02 SHORTNESS OF BREATH: ICD-10-CM

## 2022-01-28 PROCEDURE — 99204 OFFICE O/P NEW MOD 45 MIN: CPT | Mod: GC | Performed by: STUDENT IN AN ORGANIZED HEALTH CARE EDUCATION/TRAINING PROGRAM

## 2022-01-28 RX ORDER — ZINC SULFATE 50(220)MG
220 CAPSULE ORAL DAILY
COMMUNITY
End: 2023-02-22

## 2022-01-28 RX ORDER — BUDESONIDE AND FORMOTEROL FUMARATE DIHYDRATE 160; 4.5 UG/1; UG/1
2 AEROSOL RESPIRATORY (INHALATION) 2 TIMES DAILY
Qty: 1 EACH | Refills: 1 | Status: SHIPPED | OUTPATIENT
Start: 2022-01-28 | End: 2022-12-19

## 2022-01-28 RX ORDER — VITAMIN B COMPLEX
2000 TABLET ORAL DAILY
COMMUNITY

## 2022-01-28 RX ORDER — ALBUTEROL SULFATE 90 UG/1
2 AEROSOL, METERED RESPIRATORY (INHALATION) EVERY 4 HOURS PRN
Qty: 1 EACH | Refills: 0 | Status: SHIPPED | OUTPATIENT
Start: 2022-01-28 | End: 2022-12-19 | Stop reason: SDUPTHER

## 2022-01-28 RX ORDER — CHLORAL HYDRATE 500 MG
1000 CAPSULE ORAL 2 TIMES DAILY
COMMUNITY
End: 2023-02-22

## 2022-01-28 ASSESSMENT — PATIENT HEALTH QUESTIONNAIRE - PHQ9
SUM OF ALL RESPONSES TO PHQ QUESTIONS 1-9: 0
8. MOVING OR SPEAKING SO SLOWLY THAT OTHER PEOPLE COULD HAVE NOTICED. OR THE OPPOSITE, BEING SO FIGETY OR RESTLESS THAT YOU HAVE BEEN MOVING AROUND A LOT MORE THAN USUAL: NOT AT ALL
6. FEELING BAD ABOUT YOURSELF - OR THAT YOU ARE A FAILURE OR HAVE LET YOURSELF OR YOUR FAMILY DOWN: NOT AL ALL
7. TROUBLE CONCENTRATING ON THINGS, SUCH AS READING THE NEWSPAPER OR WATCHING TELEVISION: NOT AT ALL
9. THOUGHTS THAT YOU WOULD BE BETTER OFF DEAD, OR OF HURTING YOURSELF: NOT AT ALL
2. FEELING DOWN, DEPRESSED, IRRITABLE, OR HOPELESS: NOT AT ALL
3. TROUBLE FALLING OR STAYING ASLEEP OR SLEEPING TOO MUCH: NOT AT ALL
1. LITTLE INTEREST OR PLEASURE IN DOING THINGS: NOT AT ALL
4. FEELING TIRED OR HAVING LITTLE ENERGY: NOT AT ALL
5. POOR APPETITE OR OVEREATING: NOT AT ALL
SUM OF ALL RESPONSES TO PHQ9 QUESTIONS 1 AND 2: 0

## 2022-01-28 ASSESSMENT — FIBROSIS 4 INDEX: FIB4 SCORE: 0.84

## 2022-01-28 NOTE — PATIENT INSTRUCTIONS
- Use the inhaler as need for SOB  - Start taking certirizine and flonase daily for cough and congestion   - Get (fasting) lab work done when you're able   - Schedule an appointment for PAP   - Follow up in 3 months

## 2022-01-28 NOTE — TELEPHONE ENCOUNTER
Spoke to Shelly at [facility] pharmacy  [phone] albuterol hfa ready for  Symbicort not cover. Patient notify via lvm.

## 2022-01-28 NOTE — PROGRESS NOTES
Laverne Riojas is a 61 y.o. female here for New Patient (check skin lesion right side)    HPI:     61 y.o F w/ pmhx of marijuana use here to establish care, with multiple complaints as below:     Mole-   Raised lesion on barck on right side, appears <1 year ago  Not itchy, has been growing slowly  Denies personal hx of skin ca    Right foot pain  Chronic, with intermittent exacerbation   Pain on dorsum side of foot, midfoot without burning or tingling   Worse with standing, walking and contact   Nothing helps it, recently feels like a dull ache is always there   Is a massage therapist, does wear supportive footwear    Has hx of plantar fasciitis, pain is not similar to that    SOB-  Unable to tolerate exercises due to SOB, worse during the cold  Also reports intermittent night cough at night   Denies child history of asthma, hx of heart failure, denies chest pain, palpitations    Previous PCP- My Escalante MD. Has not had routine/preventative care 2-3 years per patient    Current medicines (including changes today)  Current Outpatient Medications   Medication Sig Dispense Refill   • ibuprofen (MOTRIN) 800 MG Tab Take 1 Tablet by mouth every 8 hours as needed. (Patient not taking: Reported on 2022) 30 Tablet 0   • cyclobenzaprine (FLEXERIL) 10 mg Tab Take 1 Tablet by mouth 3 times a day as needed for Mild Pain. (Patient not taking: Reported on 2022) 20 Tablet 0     No current facility-administered medications for this visit.     She  has a past medical history of Depression and Obesity.  She  has a past surgical history that includes tonsillectomy.  Social History     Tobacco Use   • Smoking status: Former Smoker     Packs/day: 0.25     Years: 25.00     Pack years: 6.25     Types: Cigarettes     Quit date: 2002     Years since quittin.5   • Smokeless tobacco: Never Used   Substance Use Topics   • Alcohol use: Yes     Comment: occ   • Drug use: Yes     Types: Marijuana     Comment: smoking  "and edibles daily for pain     Social History     Social History Narrative   • Not on file     Family History   Problem Relation Age of Onset   • Arthritis Mother    • Stroke Mother    • Sleep Apnea Mother    • Sleep Apnea Brother    • Cancer Maternal Uncle      Family Status   Relation Name Status   • Mo  Alive   • Fa  Other   • Sis  Alive   • Bro  Alive   • MUnc     • Sis  Alive         ROS    The pertinent  ROS findings can be seen in the HPI above.     All other systems reviewed and are negative     Objective:     /84 (BP Location: Left arm, Patient Position: Sitting, BP Cuff Size: Large adult long)   Pulse 68   Temp 36.3 °C (97.4 °F) (Temporal)   Ht 1.638 m (5' 4.5\")   Wt 105 kg (232 lb)   SpO2 96%  Body mass index is 39.21 kg/m².      Physical Exam:    Constitutional: Alert, no distress.  Skin: No suspicious lesions  Eye: Equal, round and reactive, conjunctiva clear, lids normal.  ENMT: Lips without lesions, good dentition, oropharynx clear. Mallampati 2  Neck: Trachea midline, no masses, no thyromegaly. No cervical or supraclavicular lymphadenopathy.  Respiratory: Unlabored respiratory effort, lungs clear to auscultation, no wheezes, no ronchi.  Cardiovascular: Normal S1, S2, no murmur, no edema  Abdomen: Soft, non-tender, no masses, no hepatosplenomegaly.  Musculoskeletal: Adequately aligned spine. ROM intact spine and extremities. No joint erythema or tenderness. Normal muscular development. Normal gait  Tenderness of the right dorsal midfoot, full ROM, DP pulse 2+ bilaterally  Neuro: CN II-XII intact. Reflexes 2+ throughout. Cerebellar testing normal. Normal and equal strength in all extremities  Psych: Mood stable, affect appropriate, thoughts and speech appropriate        Assessment and Plan:   Shortness of breath  - intermittent dyspnea on exertion, with dry cough, suspicious for reactive airway disease/asthma  - trial of albuterol inhaler. Will get spirometry w/ bronchodilator " challenge for formal diagnosis   - recommends cessation of marijuana smoking   - history and PE not suggestive of heart failure, will defer cardiac testing   - Follow up in 3 months     Right foot pain  - likely tendon vs muscle sprain, location and physical exam not suspicious for fracture. Will defer imaging at this time   -trial of topical diclofenac, physical therapy, advised non constricting, supportive footwear   - Follow in 3 months, sooner if symptoms worsened    Hemangioma of skin  - single, non blanching, raised lesion on the back consistent with cherry angioma   - will refer to derm for removal per patient's request    Obesity (BMI 30-39.9)  - previously done supervised weight loss with Dr. Hartman, not interested in bariatric surgery   - discussed sustainable weight loss goal, discuss importance of consistent diet and maintain of habits for weight loss. Will refer to nutrition for further education and formulate nutrition plan   - encourage consistent physical activity of at least 30 minutes of moderate intensity exercises daily    - follow up in 3 months    Healthcare maintenance  - ColoGuard, Mammogram and DEXA ordered for screening   - will make appointment for PAP  - discussed safe sex practices, marijuana use counseling         Instructed to Follow up in clinic or ER for worsening symptoms, difficulty breathing, lack of expected recovery, or should new symptoms or problems arise.    Followup: Return in about 3 months (around 4/28/2022).

## 2022-01-30 PROBLEM — M79.671 RIGHT FOOT PAIN: Status: ACTIVE | Noted: 2022-01-30

## 2022-01-30 PROBLEM — R06.02 SHORTNESS OF BREATH: Status: ACTIVE | Noted: 2022-01-30

## 2022-01-30 PROBLEM — D18.01 HEMANGIOMA OF SKIN: Status: ACTIVE | Noted: 2022-01-30

## 2022-01-30 PROBLEM — Z00.00 HEALTHCARE MAINTENANCE: Status: ACTIVE | Noted: 2022-01-30

## 2022-01-30 NOTE — ASSESSMENT & PLAN NOTE
- intermittent dyspnea on exertion, with dry cough, suspicious for reactive airway disease/asthma  - trial of albuterol inhaler. Will get spirometry w/ bronchodilator challenge for formal diagnosis   - recommends cessation of marijuana smoking   - history and PE not suggestive of heart failure, will defer cardiac testing   - Follow up in 3 months

## 2022-01-30 NOTE — ASSESSMENT & PLAN NOTE
- previously done supervised weight loss with Dr. Hartman, not interested in bariatric surgery   - discussed sustainable weight loss goal, discuss importance of consistent diet and maintain of habits for weight loss. Will refer to nutrition for further education and formulate nutrition plan   - encourage consistent physical activity of at least 30 minutes of moderate intensity exercises daily    - follow up in 3 months

## 2022-01-30 NOTE — ASSESSMENT & PLAN NOTE
- single, non blanching, raised lesion on the back consistent with cherry angioma   - will refer to derm for removal per patient's request

## 2022-01-30 NOTE — ASSESSMENT & PLAN NOTE
- likely tendon vs muscle sprain, location and physical exam not suspicious for fracture. Will defer imaging at this time   -trial of topical diclofenac, physical therapy, advised non constricting, supportive footwear   - Follow in 3 months, sooner if symptoms worsened

## 2022-01-30 NOTE — ASSESSMENT & PLAN NOTE
- ColoGuard, Mammogram and DEXA ordered for screening   - will make appointment for PAP  - discussed safe sex practices, marijuana use counseling

## 2022-02-03 ENCOUNTER — HOSPITAL ENCOUNTER (OUTPATIENT)
Dept: LAB | Facility: MEDICAL CENTER | Age: 62
End: 2022-02-03
Attending: STUDENT IN AN ORGANIZED HEALTH CARE EDUCATION/TRAINING PROGRAM
Payer: COMMERCIAL

## 2022-02-03 DIAGNOSIS — Z13.228 SCREENING FOR METABOLIC DISORDER: ICD-10-CM

## 2022-02-03 DIAGNOSIS — E66.9 OBESITY (BMI 30-39.9): ICD-10-CM

## 2022-02-03 LAB
ALBUMIN SERPL BCP-MCNC: 4.4 G/DL (ref 3.2–4.9)
ALBUMIN/GLOB SERPL: 1.8 G/DL
ALP SERPL-CCNC: 46 U/L (ref 30–99)
ALT SERPL-CCNC: 17 U/L (ref 2–50)
ANION GAP SERPL CALC-SCNC: 11 MMOL/L (ref 7–16)
AST SERPL-CCNC: 17 U/L (ref 12–45)
BASOPHILS # BLD AUTO: 0.7 % (ref 0–1.8)
BASOPHILS # BLD: 0.04 K/UL (ref 0–0.12)
BILIRUB SERPL-MCNC: 0.5 MG/DL (ref 0.1–1.5)
BUN SERPL-MCNC: 12 MG/DL (ref 8–22)
CALCIUM SERPL-MCNC: 9.7 MG/DL (ref 8.5–10.5)
CHLORIDE SERPL-SCNC: 107 MMOL/L (ref 96–112)
CHOLEST SERPL-MCNC: 244 MG/DL (ref 100–199)
CO2 SERPL-SCNC: 25 MMOL/L (ref 20–33)
CREAT SERPL-MCNC: 1.01 MG/DL (ref 0.5–1.4)
EOSINOPHIL # BLD AUTO: 0.14 K/UL (ref 0–0.51)
EOSINOPHIL NFR BLD: 2.5 % (ref 0–6.9)
ERYTHROCYTE [DISTWIDTH] IN BLOOD BY AUTOMATED COUNT: 45.1 FL (ref 35.9–50)
FASTING STATUS PATIENT QL REPORTED: NORMAL
GLOBULIN SER CALC-MCNC: 2.5 G/DL (ref 1.9–3.5)
GLUCOSE SERPL-MCNC: 93 MG/DL (ref 65–99)
HCT VFR BLD AUTO: 41.7 % (ref 37–47)
HDLC SERPL-MCNC: 65 MG/DL
HGB BLD-MCNC: 13.4 G/DL (ref 12–16)
IMM GRANULOCYTES # BLD AUTO: 0.01 K/UL (ref 0–0.11)
IMM GRANULOCYTES NFR BLD AUTO: 0.2 % (ref 0–0.9)
LDLC SERPL CALC-MCNC: 162 MG/DL
LYMPHOCYTES # BLD AUTO: 2.54 K/UL (ref 1–4.8)
LYMPHOCYTES NFR BLD: 45.6 % (ref 22–41)
MCH RBC QN AUTO: 29.9 PG (ref 27–33)
MCHC RBC AUTO-ENTMCNC: 32.1 G/DL (ref 33.6–35)
MCV RBC AUTO: 93.1 FL (ref 81.4–97.8)
MONOCYTES # BLD AUTO: 0.33 K/UL (ref 0–0.85)
MONOCYTES NFR BLD AUTO: 5.9 % (ref 0–13.4)
NEUTROPHILS # BLD AUTO: 2.51 K/UL (ref 2–7.15)
NEUTROPHILS NFR BLD: 45.1 % (ref 44–72)
NRBC # BLD AUTO: 0 K/UL
NRBC BLD-RTO: 0 /100 WBC
PLATELET # BLD AUTO: 266 K/UL (ref 164–446)
PMV BLD AUTO: 11.5 FL (ref 9–12.9)
POTASSIUM SERPL-SCNC: 5 MMOL/L (ref 3.6–5.5)
PROT SERPL-MCNC: 6.9 G/DL (ref 6–8.2)
RBC # BLD AUTO: 4.48 M/UL (ref 4.2–5.4)
SODIUM SERPL-SCNC: 143 MMOL/L (ref 135–145)
TRIGL SERPL-MCNC: 85 MG/DL (ref 0–149)
WBC # BLD AUTO: 5.6 K/UL (ref 4.8–10.8)

## 2022-02-03 PROCEDURE — 36415 COLL VENOUS BLD VENIPUNCTURE: CPT

## 2022-02-03 PROCEDURE — 85025 COMPLETE CBC W/AUTO DIFF WBC: CPT

## 2022-02-03 PROCEDURE — 80061 LIPID PANEL: CPT

## 2022-02-03 PROCEDURE — 80053 COMPREHEN METABOLIC PANEL: CPT

## 2022-05-27 NOTE — TELEPHONE ENCOUNTER
----- Message from My Escalante M.D. sent at 7/21/2017  1:38 PM PDT -----  Please inform patient her mammogram came back normal.  She should have a repeat mammogram in one year for continued breast cancer screening.    My Escalante M.D.    
----- Message from My Escalante M.D. sent at 7/21/2017  7:38 AM PDT -----  Please inform patient her stool study came back normal, without any blood.  This indicates with 90% accuracy that there is no blood in the colon.  She should have a repeat FIT test in one year for colon cancer screening.    My Escalante M.D.      
Phone Number Called: [phone] (home)     Message: Spoke with Pt regarding stool study. Pt was advised of the results. Pt stated she understood.     Left Message for patient to call back: yes      
Phone Number Called: [phone] (home)     Message: Advised Pt of mammogram results, Pt stated she understood    Left Message for patient to call back: yes      
184.9

## 2022-12-19 ENCOUNTER — OFFICE VISIT (OUTPATIENT)
Dept: INTERNAL MEDICINE | Facility: OTHER | Age: 62
End: 2022-12-19
Payer: COMMERCIAL

## 2022-12-19 VITALS
DIASTOLIC BLOOD PRESSURE: 77 MMHG | WEIGHT: 232.4 LBS | SYSTOLIC BLOOD PRESSURE: 112 MMHG | HEART RATE: 81 BPM | HEIGHT: 65 IN | BODY MASS INDEX: 38.72 KG/M2 | OXYGEN SATURATION: 95 % | TEMPERATURE: 97.7 F

## 2022-12-19 DIAGNOSIS — J06.9 VIRAL UPPER RESPIRATORY TRACT INFECTION: ICD-10-CM

## 2022-12-19 DIAGNOSIS — J45.20 MILD INTERMITTENT REACTIVE AIRWAY DISEASE WITHOUT COMPLICATION: ICD-10-CM

## 2022-12-19 DIAGNOSIS — R06.2 WHEEZING WITHOUT DIAGNOSIS OF ASTHMA: ICD-10-CM

## 2022-12-19 DIAGNOSIS — R05.1 ACUTE COUGH: ICD-10-CM

## 2022-12-19 PROBLEM — J45.909 REACTIVE AIRWAY DISEASE: Status: ACTIVE | Noted: 2022-12-19

## 2022-12-19 PROBLEM — Z00.00 HEALTHCARE MAINTENANCE: Status: RESOLVED | Noted: 2022-01-30 | Resolved: 2022-12-19

## 2022-12-19 PROCEDURE — 99214 OFFICE O/P EST MOD 30 MIN: CPT | Mod: GC

## 2022-12-19 RX ORDER — ALBUTEROL SULFATE 90 UG/1
2 AEROSOL, METERED RESPIRATORY (INHALATION) EVERY 4 HOURS PRN
Qty: 1 EACH | Refills: 0 | Status: SHIPPED | OUTPATIENT
Start: 2022-12-19

## 2022-12-19 RX ORDER — IBUPROFEN 800 MG/1
800 TABLET ORAL EVERY 8 HOURS PRN
Qty: 90 TABLET | Refills: 0 | Status: ON HOLD
Start: 2022-12-19 | End: 2023-02-03

## 2022-12-19 RX ORDER — BENZONATATE 100 MG/1
100 CAPSULE ORAL 3 TIMES DAILY PRN
Qty: 60 CAPSULE | Refills: 0 | Status: SHIPPED
Start: 2022-12-19 | End: 2023-01-10

## 2022-12-19 ASSESSMENT — ENCOUNTER SYMPTOMS
COUGH: 1
HEADACHES: 1
SPUTUM PRODUCTION: 1
CHILLS: 0
SHORTNESS OF BREATH: 1
SORE THROAT: 0
DIARRHEA: 0
VOMITING: 0
MYALGIAS: 1
FEVER: 0
BACK PAIN: 1
WHEEZING: 1
NAUSEA: 0

## 2022-12-19 ASSESSMENT — FIBROSIS 4 INDEX: FIB4 SCORE: 0.93

## 2022-12-19 NOTE — ASSESSMENT & PLAN NOTE
Diffuse expiratory wheezes on exam with prior subjective history of possible asthma. Prescribed albuterol inhaler at last appointment to trial for dyspnea on exertion concerning for reactive airway disease; albuterol has been helpful. Prior insurance limitations hindered her access to PFTs but she now has better insurance coverage. Given that she is a former smoker, PFTs are warranted to rule out COPD but also to evaluate for possible asthma.     Plan:  -PFTs ordered  -Albuterol refilled  -Counseled on importance of abstaining from smoking marijuana

## 2022-12-19 NOTE — PATIENT INSTRUCTIONS
Take Tessalon perles as prescribed for cough  Continue with warm fluids, albuterol inhaler as needed  Once feeling better, make appointment for pulmonary function tests  If taking Motrin, take with a full glass of water and with a meal (or snack)

## 2022-12-19 NOTE — ASSESSMENT & PLAN NOTE
Patient experiencing a cough productive of clear sputum for approximately one week.     See Viral upper respiratory tract infection for more details

## 2022-12-19 NOTE — PROGRESS NOTES
Established Patient    Patient Care Team:  Annmarie De Oliveira M.D. as PCP - General (Internal Medicine)    Laverne Riojas is a 62 y.o. female who presents today with the following Chief Complaint(s): Follow up for Diagnoses of Acute cough, Viral upper respiratory tract infection, Wheezing without diagnosis of asthma, and Mild intermittent reactive airway disease without complication were pertinent to this visit.    HPI:  Ms. Laverne Riojas is a 61 YO F with a PMHx intermittent dyspnea on exertion, morbid obesity, prior smoker who presents to clinic today due to one week of cough productive of clear sputum associated with prior subjective fever, chills, body aches, with negative rapid Covid test and no nausea, vomiting, diarrhea, or runny nose. She did not get her flu shot this year. Today she feels like her other symptoms have resolved but the cough lingers and is severe enough that it is causing back muscle pain due to the force. She also has associated shortness of breath that is relieved by the albuterol inhaler that was prescribed by Dr. De Oliveira in January for possible reactive airway disease. She also has found relief with [place], melatonin, and other herbal remedies. She has not smoked marijuana since she has felt sick and been coughing.     She is interested in PFTs, but in the past had not been able to afford them. However her insurance recently changed and she would like to  clarify if she has asthma as has been alluded to in the past.       Review of Systems   Constitutional:  Negative for chills (Felt chills last week) and fever (Felt feverish last week).   HENT:  Positive for congestion (Occasional in the mornings). Negative for sore throat.         No loss of smell   Respiratory:  Positive for cough, sputum production, shortness of breath and wheezing.    Gastrointestinal:  Negative for diarrhea, nausea and vomiting.   Musculoskeletal:  Positive for back pain and myalgias.   Neurological:   Positive for headaches.     Past Medical History:   Diagnosis Date    Depression     Obesity        Social History     Tobacco Use    Smoking status: Former     Packs/day: 0.25     Years: 25.00     Pack years: 6.25     Types: Cigarettes     Quit date: 2002     Years since quittin.4    Smokeless tobacco: Never   Vaping Use    Vaping Use: Never used   Substance Use Topics    Alcohol use: Yes     Comment: occ    Drug use: Yes     Types: Marijuana     Comment: smoking and edibles daily for pain       Current Outpatient Medications   Medication Sig Dispense Refill    benzonatate (TESSALON) 100 MG Cap Take 1 Capsule by mouth 3 times a day as needed for Cough. 60 Capsule 0    albuterol 108 (90 Base) MCG/ACT Aero Soln inhalation aerosol Inhale 2 Puffs every four hours as needed for Shortness of Breath. 1 Each 0    ibuprofen (MOTRIN) 800 MG Tab Take 1 Tablet by mouth every 8 hours as needed for Headache or Mild Pain. 90 Tablet 0    vitamin D3 (CHOLECALCIFEROL) 1000 Unit (25 mcg) Tab Take 2,000 Units by mouth every day.      Omega-3 Fatty Acids (FISH OIL) 1000 MG Cap capsule Take 1,000 mg by mouth 2 times a day.      zinc sulfate (ZINCATE) 220 (50 Zn) MG Cap Take 220 mg by mouth every day. (Patient not taking: Reported on 2022)       No current facility-administered medications for this visit.       Results for orders placed or performed during the hospital encounter of 22   Lipid Profile   Result Value Ref Range    Cholesterol,Tot 244 (H) 100 - 199 mg/dL    Triglycerides 85 0 - 149 mg/dL    HDL 65 >=40 mg/dL     (H) <100 mg/dL   Comp Metabolic Panel   Result Value Ref Range    Sodium 143 135 - 145 mmol/L    Potassium 5.0 3.6 - 5.5 mmol/L    Chloride 107 96 - 112 mmol/L    Co2 25 20 - 33 mmol/L    Anion Gap 11.0 7.0 - 16.0    Glucose 93 65 - 99 mg/dL    Bun 12 8 - 22 mg/dL    Creatinine 1.01 0.50 - 1.40 mg/dL    Calcium 9.7 8.5 - 10.5 mg/dL    AST(SGOT) 17 12 - 45 U/L    ALT(SGPT) 17 2 - 50 U/L     "Alkaline Phosphatase 46 30 - 99 U/L    Total Bilirubin 0.5 0.1 - 1.5 mg/dL    Albumin 4.4 3.2 - 4.9 g/dL    Total Protein 6.9 6.0 - 8.2 g/dL    Globulin 2.5 1.9 - 3.5 g/dL    A-G Ratio 1.8 g/dL   CBC WITH DIFFERENTIAL   Result Value Ref Range    WBC 5.6 4.8 - 10.8 K/uL    RBC 4.48 4.20 - 5.40 M/uL    Hemoglobin 13.4 12.0 - 16.0 g/dL    Hematocrit 41.7 37.0 - 47.0 %    MCV 93.1 81.4 - 97.8 fL    MCH 29.9 27.0 - 33.0 pg    MCHC 32.1 (L) 33.6 - 35.0 g/dL    RDW 45.1 35.9 - 50.0 fL    Platelet Count 266 164 - 446 K/uL    MPV 11.5 9.0 - 12.9 fL    Neutrophils-Polys 45.10 44.00 - 72.00 %    Lymphocytes 45.60 (H) 22.00 - 41.00 %    Monocytes 5.90 0.00 - 13.40 %    Eosinophils 2.50 0.00 - 6.90 %    Basophils 0.70 0.00 - 1.80 %    Immature Granulocytes 0.20 0.00 - 0.90 %    Nucleated RBC 0.00 /100 WBC    Neutrophils (Absolute) 2.51 2.00 - 7.15 K/uL    Lymphs (Absolute) 2.54 1.00 - 4.80 K/uL    Monos (Absolute) 0.33 0.00 - 0.85 K/uL    Eos (Absolute) 0.14 0.00 - 0.51 K/uL    Baso (Absolute) 0.04 0.00 - 0.12 K/uL    Immature Granulocytes (abs) 0.01 0.00 - 0.11 K/uL    NRBC (Absolute) 0.00 K/uL   FASTING STATUS   Result Value Ref Range    Fasting Status Fasting    ESTIMATED GFR   Result Value Ref Range    GFR If African American >60 >60 mL/min/1.73 m 2    GFR If Non  56 (A) >60 mL/min/1.73 m 2       /77 (BP Location: Left arm, Patient Position: Sitting, BP Cuff Size: Adult)   Pulse 81   Temp 36.5 °C (97.7 °F) (Temporal)   Ht 1.638 m (5' 4.5\")   Wt 105 kg (232 lb 6.4 oz)   SpO2 95%   BMI 39.28 kg/m²     Physical Exam  Vitals reviewed.   Constitutional:       General: She is not in acute distress.     Appearance: Normal appearance. She is obese. She is not ill-appearing, toxic-appearing or diaphoretic.   HENT:      Head: Normocephalic and atraumatic.   Eyes:      General: No scleral icterus.        Right eye: No discharge.         Left eye: No discharge.      Extraocular Movements: Extraocular " movements intact.   Cardiovascular:      Rate and Rhythm: Normal rate and regular rhythm.   Pulmonary:      Effort: Pulmonary effort is normal. No respiratory distress.      Breath sounds: No stridor. Wheezing present. No rhonchi or rales.      Comments: No accessory muscle use or tripoding  Chest:      Chest wall: No tenderness.   Skin:     General: Skin is warm and dry.   Neurological:      General: No focal deficit present.      Mental Status: She is alert and oriented to person, place, and time.   Psychiatric:         Mood and Affect: Mood normal.         Behavior: Behavior normal.         Assessment and Plan:     Viral upper respiratory tract infection  Experiencing one week of cough productive of clear sputum associated with resolved subjective fever, chills, body aches, morning congestion without sore throat, nausea, vomiting, or diarrhea. Does not get annual flu shot. Covid rapid swab negative last week. Overall improving but lingering cough uncomfortable and causing muscle pain from effort. O2 95% on room air, deep inspiration induces cough, but no increased work of breathing, accessory muscle use, tripoding, or other evidence of severe respiratory infection. Wheezes appreciated diffusely on expiration but no reduced breath sounds, crackles. Given that symptom onset was one week ago, we are outside the window for influenza treatment and she is improving symptomatically, so influenza testing would not  at this point.    Plan:  -Tessalon perles 100 mg TID PRN prescribed  -Albuterol inhaler refilled  -Counseled on continuing warm fluids and other conservative measures as she has been  -Counseled on red flag signs such as worsening shortness of breath, sputum change in character, worsening fever, chills, body aches, etc. and if so to seek medical attention again.    Acute cough  Patient experiencing a cough productive of clear sputum for approximately one week.     See Viral upper respiratory  tract infection for more details    Wheezing without diagnosis of asthma  Diffuse expiratory wheezes on exam with prior subjective history of possible asthma. Prescribed albuterol inhaler at last appointment to trial for dyspnea on exertion concerning for reactive airway disease; albuterol has been helpful. Prior insurance limitations hindered her access to PFTs but she now has better insurance coverage. Given that she is a former smoker, PFTs are warranted to rule out COPD but also to evaluate for possible asthma.     Plan:  -PFTs ordered  -Albuterol refilled  -Counseled on importance of abstaining from smoking marijuana       Orders Placed This Encounter    PULMONARY FUNCTION TESTS -Test requested: Complete Pulmonary Function Test    benzonatate (TESSALON) 100 MG Cap    albuterol 108 (90 Base) MCG/ACT Aero Soln inhalation aerosol    ibuprofen (MOTRIN) 800 MG Tab       No follow-ups on file.    Patient Instructions   Take Tessalon perles as prescribed for cough  Continue with warm fluids, albuterol inhaler as needed  Once feeling better, make appointment for pulmonary function tests  If taking Motrin, take with a full glass of water and with a meal (or snack)      Irena Montes M.D. PGY I  [employer] [employer]

## 2022-12-19 NOTE — ASSESSMENT & PLAN NOTE
Experiencing one week of cough productive of clear sputum associated with resolved subjective fever, chills, body aches, morning congestion without sore throat, nausea, vomiting, or diarrhea. Does not get annual flu shot. Covid rapid swab negative last week. Overall improving but lingering cough uncomfortable and causing muscle pain from effort. O2 95% on room air, deep inspiration induces cough, but no increased work of breathing, accessory muscle use, tripoding, or other evidence of severe respiratory infection. Wheezes appreciated diffusely on expiration but no reduced breath sounds, crackles. Given that symptom onset was one week ago, we are outside the window for influenza treatment and she is improving symptomatically, so influenza testing would not  at this point.    Plan:  -Tessalon perles 100 mg TID PRN prescribed  -Albuterol inhaler refilled  -Counseled on continuing warm fluids and other conservative measures as she has been  -Counseled on red flag signs such as worsening shortness of breath, sputum change in character, worsening fever, chills, body aches, etc. and if so to seek medical attention again.

## 2023-01-10 ENCOUNTER — APPOINTMENT (OUTPATIENT)
Dept: RADIOLOGY | Facility: IMAGING CENTER | Age: 63
End: 2023-01-10
Attending: STUDENT IN AN ORGANIZED HEALTH CARE EDUCATION/TRAINING PROGRAM
Payer: COMMERCIAL

## 2023-01-10 ENCOUNTER — OFFICE VISIT (OUTPATIENT)
Dept: INTERNAL MEDICINE | Facility: OTHER | Age: 63
End: 2023-01-10
Payer: COMMERCIAL

## 2023-01-10 VITALS
SYSTOLIC BLOOD PRESSURE: 122 MMHG | BODY MASS INDEX: 38.99 KG/M2 | WEIGHT: 234 LBS | TEMPERATURE: 97.2 F | HEART RATE: 74 BPM | HEIGHT: 65 IN | DIASTOLIC BLOOD PRESSURE: 70 MMHG | OXYGEN SATURATION: 94 %

## 2023-01-10 DIAGNOSIS — R05.1 ACUTE COUGH: ICD-10-CM

## 2023-01-10 DIAGNOSIS — M25.551 RIGHT HIP PAIN: ICD-10-CM

## 2023-01-10 DIAGNOSIS — E66.9 OBESITY (BMI 30-39.9): ICD-10-CM

## 2023-01-10 DIAGNOSIS — E66.01 MORBID OBESITY WITH BMI OF 40.0-44.9, ADULT (HCC): ICD-10-CM

## 2023-01-10 DIAGNOSIS — M25.551 HIP PAIN, RIGHT: ICD-10-CM

## 2023-01-10 PROCEDURE — 99214 OFFICE O/P EST MOD 30 MIN: CPT | Mod: GC | Performed by: STUDENT IN AN ORGANIZED HEALTH CARE EDUCATION/TRAINING PROGRAM

## 2023-01-10 PROCEDURE — 73521 X-RAY EXAM HIPS BI 2 VIEWS: CPT | Mod: TC | Performed by: PHYSICIAN ASSISTANT

## 2023-01-10 PROCEDURE — 71046 X-RAY EXAM CHEST 2 VIEWS: CPT | Mod: TC | Performed by: PHYSICIAN ASSISTANT

## 2023-01-10 RX ORDER — GUAIFENESIN AND DEXTROMETHORPHAN HYDROBROMIDE 100; 10 MG/5ML; MG/5ML
5 SOLUTION ORAL EVERY 6 HOURS PRN
Qty: 118 ML | Refills: 0 | Status: SHIPPED
Start: 2023-01-10 | End: 2023-02-22

## 2023-01-10 RX ORDER — M-VIT,TX,IRON,MINS/CALC/FOLIC 27MG-0.4MG
1 TABLET ORAL DAILY
COMMUNITY
End: 2023-02-22

## 2023-01-10 RX ORDER — CYCLOBENZAPRINE HCL 5 MG
5 TABLET ORAL 3 TIMES DAILY PRN
Qty: 30 TABLET | Refills: 0 | Status: SHIPPED | OUTPATIENT
Start: 2023-01-10 | End: 2023-03-06 | Stop reason: SDUPTHER

## 2023-01-10 ASSESSMENT — FIBROSIS 4 INDEX: FIB4 SCORE: 0.93

## 2023-01-10 ASSESSMENT — PATIENT HEALTH QUESTIONNAIRE - PHQ9: CLINICAL INTERPRETATION OF PHQ2 SCORE: 0

## 2023-01-10 NOTE — ASSESSMENT & PLAN NOTE
- acute on chronic hip pain, physical exam without alarming signs, likely muscular sprain vs OA  - trial of flexeril for acute pain. Referral to PT ordered   - will get Xray Hip and L/S for further evaluation if not improved with PT  - Follow up in 5 weeks

## 2023-01-10 NOTE — PROGRESS NOTES
"Laverne Riojas is a 61 y.o. female here for Hip Pain (Patient here for sinus infection and right hip pain lower back)    HPI:     61 y.o F w/ pmhx of chronic back pain, marijuana use here for acute visit w/ c/o persistent productive cough and evaluation of hip pain     Cough-   Started 3-4 weeks ago, with associated subjective fever and chills at onset, also with malaise. Home COVID-19 test was negative, was seen in clinic in 12/19 and provided tessalon perles and albuterol for symptomatic treatment. Reports no further fever or chills since last visit, however with persistent productive cough and head congestion. Denies any known sick contact, GI symptoms.     Right hip pain-   Chronic, not improving with conservative treatment (massages, stretching, chiropractic manipulation)   Sharp pain on lateral side of hip, denies any radiation down the leg, rare radiation to groin. Denies history of trauma, left hip pain   Sometimes associated with low back pain and not always  Worse with walking and certain motions, hear a \"pop\" during yoga last week  Previously had Xray done by chiropractor \"years ago\"    Current medicines (including changes today)  Current Outpatient Medications   Medication Sig Dispense Refill    therapeutic multivitamin-minerals (THERAGRAN-M) Tab Take 1 Tablet by mouth every day.      Dextromethorphan-guaiFENesin (TUSSIN DM)  MG/5ML Syrup Take 5 mL by mouth every 6 hours as needed (cough). 118 mL 0    cyclobenzaprine (FLEXERIL) 5 mg tablet Take 1 Tablet by mouth 3 times a day as needed for Muscle Spasms. 30 Tablet 0    albuterol 108 (90 Base) MCG/ACT Aero Soln inhalation aerosol Inhale 2 Puffs every four hours as needed for Shortness of Breath. 1 Each 0    ibuprofen (MOTRIN) 800 MG Tab Take 1 Tablet by mouth every 8 hours as needed for Headache or Mild Pain. 90 Tablet 0    zinc sulfate (ZINCATE) 220 (50 Zn) MG Cap Take 220 mg by mouth every day.      vitamin D3 (CHOLECALCIFEROL) 1000 Unit (25 " "mcg) Tab Take 2,000 Units by mouth every day.      Omega-3 Fatty Acids (FISH OIL) 1000 MG Cap capsule Take 1,000 mg by mouth 2 times a day.       No current facility-administered medications for this visit.     She  has a past medical history of Depression and Obesity.  She  has a past surgical history that includes tonsillectomy.  Social History     Tobacco Use    Smoking status: Former     Packs/day: 0.25     Years: 25.00     Pack years: 6.25     Types: Cigarettes     Quit date: 2002     Years since quittin.5    Smokeless tobacco: Never   Vaping Use    Vaping Use: Never used   Substance Use Topics    Alcohol use: Yes     Comment: occ    Drug use: Yes     Types: Marijuana     Comment: smoking and edibles daily for pain     Social History     Social History Narrative    Not on file     Family History   Problem Relation Age of Onset    Arthritis Mother     Stroke Mother     Sleep Apnea Mother     Sleep Apnea Brother     Cancer Maternal Uncle      Family Status   Relation Name Status    Mo  Alive    Fa  Other    Sis  Alive    Bro  Alive    MUnc      Sis  Alive         ROS    The pertinent  ROS findings can be seen in the HPI above.     All other systems reviewed and are negative     Objective:     /70 (BP Location: Left arm, Patient Position: Sitting, BP Cuff Size: Large adult)   Pulse 74   Temp 36.2 °C (97.2 °F) (Temporal)   Ht 1.638 m (5' 4.5\")   Wt 106 kg (234 lb)   SpO2 94%  Body mass index is 39.55 kg/m².      Physical Exam:    Constitutional: Alert, no distress.  Skin: No suspicious lesions  Eye: Equal, round and reactive, conjunctiva clear, lids normal.  ENMT: Lips without lesions, good dentition, oropharynx clear. Mallampati 2  Neck: Trachea midline, no masses, no thyromegaly. No cervical or supraclavicular lymphadenopathy.  Respiratory: Unlabored respiratory effort, lungs clear to auscultation, no wheezes, no ronchi.  Cardiovascular: Normal S1, S2, no murmur, no edema  Abdomen: " Soft, non-tender, no masses, no hepatosplenomegaly.  Musculoskeletal: Adequately aligned spine. ROM intact spine and extremities. No joint erythema or tenderness. Normal muscular development. Normal gait. Straight leg test negative, GIDEON test negative, tenderness to palpation to the lumbar paraspinal m.  Tenderness of the right dorsal midfoot, full ROM, DP pulse 2+ bilaterally  Neuro: CN II-XII intact. Reflexes 2+ throughout. Cerebellar testing normal. Normal and equal strength in all extremities  Psych: Mood stable, affect appropriate, thoughts and speech appropriate    Assessment and Plan:   Acute cough  - likely secondary to viral URI, will get chest Xray to evaluate for secondary bacterial pneumonia although lung exam clear so will hold Abx pending Xray result   - trial of Robitussin DM for cough, nasal saline rinse for congestion   - advised return to clinic if not improving in one week or if fever/chills develop      Hip pain, right  - acute on chronic hip pain, physical exam without alarming signs, likely muscular sprain vs OA  - trial of flexeril for acute pain. Referral to PT ordered   - will get Xray Hip and L/S for further evaluation if not improved with PT  - Follow up in 5 weeks    Obesity (BMI 30-39.9)  - follow up in 5 weeks to discuss weight loss strategy      Instructed to Follow up in clinic or ER for worsening symptoms, difficulty breathing, lack of expected recovery, or should new symptoms or problems arise.    Followup: Return in about 5 weeks (around 2/14/2023).

## 2023-01-10 NOTE — ASSESSMENT & PLAN NOTE
- likely secondary to viral URI, will get chest Xray to evaluate for secondary bacterial pneumonia although lung exam clear so will hold Abx pending Xray result   - trial of Robitussin DM for cough, nasal saline rinse for congestion   - advised return to clinic if not improving in one week or if fever/chills develop

## 2023-01-10 NOTE — PATIENT INSTRUCTIONS
- Use the cough syrup as needed and nasal rinse for congestion   - Get chest Xray if your symptoms are not improving, call clinic if you start developing fever or chills   -Work with physical therapy for your hip pain  - Follow up in 5-6 weeks for chronic medical condition

## 2023-01-18 ENCOUNTER — HOSPITAL ENCOUNTER (OUTPATIENT)
Dept: RADIOLOGY | Facility: MEDICAL CENTER | Age: 63
End: 2023-01-18
Attending: STUDENT IN AN ORGANIZED HEALTH CARE EDUCATION/TRAINING PROGRAM
Payer: COMMERCIAL

## 2023-01-18 DIAGNOSIS — Z12.31 ENCOUNTER FOR SCREENING MAMMOGRAM FOR MALIGNANT NEOPLASM OF BREAST: ICD-10-CM

## 2023-01-18 PROCEDURE — 77063 BREAST TOMOSYNTHESIS BI: CPT

## 2023-02-01 ENCOUNTER — APPOINTMENT (OUTPATIENT)
Dept: RADIOLOGY | Facility: MEDICAL CENTER | Age: 63
DRG: 378 | End: 2023-02-01
Attending: EMERGENCY MEDICINE
Payer: COMMERCIAL

## 2023-02-01 ENCOUNTER — APPOINTMENT (OUTPATIENT)
Dept: RADIOLOGY | Facility: MEDICAL CENTER | Age: 63
DRG: 378 | End: 2023-02-01
Attending: HOSPITALIST
Payer: COMMERCIAL

## 2023-02-01 ENCOUNTER — HOSPITAL ENCOUNTER (INPATIENT)
Facility: MEDICAL CENTER | Age: 63
LOS: 3 days | DRG: 378 | End: 2023-02-06
Attending: EMERGENCY MEDICINE | Admitting: HOSPITALIST
Payer: COMMERCIAL

## 2023-02-01 DIAGNOSIS — R55 NEAR SYNCOPE: ICD-10-CM

## 2023-02-01 DIAGNOSIS — K57.93 GASTROINTESTINAL HEMORRHAGE ASSOCIATED WITH INTESTINAL DIVERTICULITIS: ICD-10-CM

## 2023-02-01 DIAGNOSIS — R42 LIGHT HEADED: ICD-10-CM

## 2023-02-01 DIAGNOSIS — K92.2 GASTROINTESTINAL HEMORRHAGE, UNSPECIFIED GASTROINTESTINAL HEMORRHAGE TYPE: ICD-10-CM

## 2023-02-01 DIAGNOSIS — K52.9 COLITIS: ICD-10-CM

## 2023-02-01 DIAGNOSIS — I47.29 NON-SUSTAINED VENTRICULAR TACHYCARDIA (HCC): ICD-10-CM

## 2023-02-01 LAB
ALBUMIN SERPL BCP-MCNC: 4 G/DL (ref 3.2–4.9)
ALBUMIN/GLOB SERPL: 1.5 G/DL
ALP SERPL-CCNC: 47 U/L (ref 30–99)
ALT SERPL-CCNC: 19 U/L (ref 2–50)
ANION GAP SERPL CALC-SCNC: 11 MMOL/L (ref 7–16)
APPEARANCE UR: CLEAR
AST SERPL-CCNC: 18 U/L (ref 12–45)
BACTERIA #/AREA URNS HPF: NEGATIVE /HPF
BASOPHILS # BLD AUTO: 0.5 % (ref 0–1.8)
BASOPHILS # BLD: 0.04 K/UL (ref 0–0.12)
BILIRUB SERPL-MCNC: 0.3 MG/DL (ref 0.1–1.5)
BILIRUB UR QL STRIP.AUTO: NEGATIVE
BUN SERPL-MCNC: 17 MG/DL (ref 8–22)
CALCIUM ALBUM COR SERPL-MCNC: 8.9 MG/DL (ref 8.5–10.5)
CALCIUM SERPL-MCNC: 8.9 MG/DL (ref 8.5–10.5)
CHLORIDE SERPL-SCNC: 110 MMOL/L (ref 96–112)
CO2 SERPL-SCNC: 22 MMOL/L (ref 20–33)
COLOR UR: YELLOW
CREAT SERPL-MCNC: 0.99 MG/DL (ref 0.5–1.4)
EKG IMPRESSION: NORMAL
EOSINOPHIL # BLD AUTO: 0.16 K/UL (ref 0–0.51)
EOSINOPHIL NFR BLD: 2.1 % (ref 0–6.9)
EPI CELLS #/AREA URNS HPF: NEGATIVE /HPF
ERYTHROCYTE [DISTWIDTH] IN BLOOD BY AUTOMATED COUNT: 46.2 FL (ref 35.9–50)
GFR SERPLBLD CREATININE-BSD FMLA CKD-EPI: 65 ML/MIN/1.73 M 2
GLOBULIN SER CALC-MCNC: 2.6 G/DL (ref 1.9–3.5)
GLUCOSE SERPL-MCNC: 110 MG/DL (ref 65–99)
GLUCOSE UR STRIP.AUTO-MCNC: NEGATIVE MG/DL
HCT VFR BLD AUTO: 39.6 % (ref 37–47)
HGB BLD-MCNC: 12.7 G/DL (ref 12–16)
IMM GRANULOCYTES # BLD AUTO: 0.03 K/UL (ref 0–0.11)
IMM GRANULOCYTES NFR BLD AUTO: 0.4 % (ref 0–0.9)
KETONES UR STRIP.AUTO-MCNC: ABNORMAL MG/DL
LEUKOCYTE ESTERASE UR QL STRIP.AUTO: NEGATIVE
LYMPHOCYTES # BLD AUTO: 3.41 K/UL (ref 1–4.8)
LYMPHOCYTES NFR BLD: 44.3 % (ref 22–41)
MAGNESIUM SERPL-MCNC: 1.9 MG/DL (ref 1.5–2.5)
MCH RBC QN AUTO: 29.5 PG (ref 27–33)
MCHC RBC AUTO-ENTMCNC: 32.1 G/DL (ref 33.6–35)
MCV RBC AUTO: 92.1 FL (ref 81.4–97.8)
MICRO URNS: ABNORMAL
MONOCYTES # BLD AUTO: 0.35 K/UL (ref 0–0.85)
MONOCYTES NFR BLD AUTO: 4.6 % (ref 0–13.4)
NEUTROPHILS # BLD AUTO: 3.7 K/UL (ref 2–7.15)
NEUTROPHILS NFR BLD: 48.1 % (ref 44–72)
NITRITE UR QL STRIP.AUTO: NEGATIVE
NRBC # BLD AUTO: 0 K/UL
NRBC BLD-RTO: 0 /100 WBC
PH UR STRIP.AUTO: 5.5 [PH] (ref 5–8)
PLATELET # BLD AUTO: 275 K/UL (ref 164–446)
PMV BLD AUTO: 10.9 FL (ref 9–12.9)
POTASSIUM SERPL-SCNC: 3.6 MMOL/L (ref 3.6–5.5)
PROT SERPL-MCNC: 6.6 G/DL (ref 6–8.2)
PROT UR QL STRIP: 30 MG/DL
RBC # BLD AUTO: 4.3 M/UL (ref 4.2–5.4)
RBC # URNS HPF: NORMAL /HPF
RBC UR QL AUTO: NEGATIVE
SODIUM SERPL-SCNC: 143 MMOL/L (ref 135–145)
SP GR UR STRIP.AUTO: 1.03
TROPONIN T SERPL-MCNC: <6 NG/L (ref 6–19)
TROPONIN T SERPL-MCNC: <6 NG/L (ref 6–19)
UROBILINOGEN UR STRIP.AUTO-MCNC: 0.2 MG/DL
WBC # BLD AUTO: 7.7 K/UL (ref 4.8–10.8)
WBC #/AREA URNS HPF: NORMAL /HPF

## 2023-02-01 PROCEDURE — 80053 COMPREHEN METABOLIC PANEL: CPT

## 2023-02-01 PROCEDURE — 99285 EMERGENCY DEPT VISIT HI MDM: CPT

## 2023-02-01 PROCEDURE — 96375 TX/PRO/DX INJ NEW DRUG ADDON: CPT

## 2023-02-01 PROCEDURE — 84484 ASSAY OF TROPONIN QUANT: CPT | Mod: 91

## 2023-02-01 PROCEDURE — G0378 HOSPITAL OBSERVATION PER HR: HCPCS

## 2023-02-01 PROCEDURE — 81001 URINALYSIS AUTO W/SCOPE: CPT

## 2023-02-01 PROCEDURE — 700111 HCHG RX REV CODE 636 W/ 250 OVERRIDE (IP): Performed by: HOSPITALIST

## 2023-02-01 PROCEDURE — 93005 ELECTROCARDIOGRAM TRACING: CPT | Performed by: EMERGENCY MEDICINE

## 2023-02-01 PROCEDURE — 94760 N-INVAS EAR/PLS OXIMETRY 1: CPT

## 2023-02-01 PROCEDURE — 99223 1ST HOSP IP/OBS HIGH 75: CPT | Performed by: HOSPITALIST

## 2023-02-01 PROCEDURE — 96365 THER/PROPH/DIAG IV INF INIT: CPT

## 2023-02-01 PROCEDURE — 83735 ASSAY OF MAGNESIUM: CPT

## 2023-02-01 PROCEDURE — 96367 TX/PROPH/DG ADDL SEQ IV INF: CPT

## 2023-02-01 PROCEDURE — 36415 COLL VENOUS BLD VENIPUNCTURE: CPT

## 2023-02-01 PROCEDURE — 71045 X-RAY EXAM CHEST 1 VIEW: CPT

## 2023-02-01 PROCEDURE — 700105 HCHG RX REV CODE 258: Performed by: EMERGENCY MEDICINE

## 2023-02-01 PROCEDURE — 93005 ELECTROCARDIOGRAM TRACING: CPT

## 2023-02-01 PROCEDURE — 85025 COMPLETE CBC W/AUTO DIFF WBC: CPT

## 2023-02-01 RX ORDER — PROCHLORPERAZINE EDISYLATE 5 MG/ML
5-10 INJECTION INTRAMUSCULAR; INTRAVENOUS EVERY 4 HOURS PRN
Status: DISCONTINUED | OUTPATIENT
Start: 2023-02-01 | End: 2023-02-06 | Stop reason: HOSPADM

## 2023-02-01 RX ORDER — LABETALOL HYDROCHLORIDE 5 MG/ML
10 INJECTION, SOLUTION INTRAVENOUS EVERY 4 HOURS PRN
Status: DISCONTINUED | OUTPATIENT
Start: 2023-02-01 | End: 2023-02-06 | Stop reason: HOSPADM

## 2023-02-01 RX ORDER — AMOXICILLIN 250 MG
2 CAPSULE ORAL 2 TIMES DAILY
Status: DISCONTINUED | OUTPATIENT
Start: 2023-02-01 | End: 2023-02-04

## 2023-02-01 RX ORDER — MAGNESIUM SULFATE HEPTAHYDRATE 40 MG/ML
2 INJECTION, SOLUTION INTRAVENOUS ONCE
Status: COMPLETED | OUTPATIENT
Start: 2023-02-01 | End: 2023-02-02

## 2023-02-01 RX ORDER — ONDANSETRON 2 MG/ML
4 INJECTION INTRAMUSCULAR; INTRAVENOUS EVERY 4 HOURS PRN
Status: DISCONTINUED | OUTPATIENT
Start: 2023-02-01 | End: 2023-02-06 | Stop reason: HOSPADM

## 2023-02-01 RX ORDER — SODIUM CHLORIDE 9 MG/ML
1000 INJECTION, SOLUTION INTRAVENOUS ONCE
Status: COMPLETED | OUTPATIENT
Start: 2023-02-01 | End: 2023-02-01

## 2023-02-01 RX ORDER — PROMETHAZINE HYDROCHLORIDE 25 MG/1
12.5-25 SUPPOSITORY RECTAL EVERY 4 HOURS PRN
Status: DISCONTINUED | OUTPATIENT
Start: 2023-02-01 | End: 2023-02-06 | Stop reason: HOSPADM

## 2023-02-01 RX ORDER — ONDANSETRON 4 MG/1
4 TABLET, ORALLY DISINTEGRATING ORAL EVERY 4 HOURS PRN
Status: DISCONTINUED | OUTPATIENT
Start: 2023-02-01 | End: 2023-02-06 | Stop reason: HOSPADM

## 2023-02-01 RX ORDER — POLYETHYLENE GLYCOL 3350 17 G/17G
1 POWDER, FOR SOLUTION ORAL
Status: DISCONTINUED | OUTPATIENT
Start: 2023-02-01 | End: 2023-02-04

## 2023-02-01 RX ORDER — PANTOPRAZOLE SODIUM 40 MG/10ML
40 INJECTION, POWDER, LYOPHILIZED, FOR SOLUTION INTRAVENOUS 2 TIMES DAILY
Status: DISCONTINUED | OUTPATIENT
Start: 2023-02-01 | End: 2023-02-03

## 2023-02-01 RX ORDER — ACETAMINOPHEN 325 MG/1
650 TABLET ORAL EVERY 6 HOURS PRN
Status: DISCONTINUED | OUTPATIENT
Start: 2023-02-01 | End: 2023-02-06 | Stop reason: HOSPADM

## 2023-02-01 RX ORDER — PROMETHAZINE HYDROCHLORIDE 25 MG/1
12.5-25 TABLET ORAL EVERY 4 HOURS PRN
Status: DISCONTINUED | OUTPATIENT
Start: 2023-02-01 | End: 2023-02-06 | Stop reason: HOSPADM

## 2023-02-01 RX ORDER — POTASSIUM CHLORIDE 20 MEQ/1
40 TABLET, EXTENDED RELEASE ORAL ONCE
Status: COMPLETED | OUTPATIENT
Start: 2023-02-01 | End: 2023-02-02

## 2023-02-01 RX ORDER — BISACODYL 10 MG
10 SUPPOSITORY, RECTAL RECTAL
Status: DISCONTINUED | OUTPATIENT
Start: 2023-02-01 | End: 2023-02-04

## 2023-02-01 RX ADMIN — SODIUM CHLORIDE 1000 ML: 9 INJECTION, SOLUTION INTRAVENOUS at 20:14

## 2023-02-01 RX ADMIN — MAGNESIUM SULFATE HEPTAHYDRATE 2 G: 40 INJECTION, SOLUTION INTRAVENOUS at 23:21

## 2023-02-01 RX ADMIN — ONDANSETRON 4 MG: 2 INJECTION INTRAMUSCULAR; INTRAVENOUS at 23:37

## 2023-02-01 ASSESSMENT — ENCOUNTER SYMPTOMS
NAUSEA: 0
SPUTUM PRODUCTION: 0
BACK PAIN: 0
ABDOMINAL PAIN: 0
POLYDIPSIA: 0
CONSTIPATION: 0
FLANK PAIN: 0
FALLS: 0
FEVER: 0
PHOTOPHOBIA: 0
WEAKNESS: 0
DEPRESSION: 0
SORE THROAT: 0
BLOOD IN STOOL: 0
CLAUDICATION: 0
PALPITATIONS: 1
TREMORS: 0
SINUS PAIN: 0
BLURRED VISION: 0
DIAPHORESIS: 0
PND: 0
WHEEZING: 0
STRIDOR: 0
HEADACHES: 0
CHILLS: 0
EYE PAIN: 0
HALLUCINATIONS: 0
NECK PAIN: 0
HEMOPTYSIS: 0
SHORTNESS OF BREATH: 0
BRUISES/BLEEDS EASILY: 0
DIZZINESS: 0
DIARRHEA: 0
COUGH: 0
HEARTBURN: 0
MYALGIAS: 0
VOMITING: 0
DOUBLE VISION: 0
ORTHOPNEA: 0
TINGLING: 0

## 2023-02-01 ASSESSMENT — PATIENT HEALTH QUESTIONNAIRE - PHQ9
2. FEELING DOWN, DEPRESSED, IRRITABLE, OR HOPELESS: NOT AT ALL
SUM OF ALL RESPONSES TO PHQ9 QUESTIONS 1 AND 2: 0
1. LITTLE INTEREST OR PLEASURE IN DOING THINGS: NOT AT ALL

## 2023-02-01 ASSESSMENT — COGNITIVE AND FUNCTIONAL STATUS - GENERAL
MOBILITY SCORE: 24
DAILY ACTIVITIY SCORE: 24
SUGGESTED CMS G CODE MODIFIER MOBILITY: CH
SUGGESTED CMS G CODE MODIFIER DAILY ACTIVITY: CH

## 2023-02-01 ASSESSMENT — FIBROSIS 4 INDEX
FIB4 SCORE: 0.9
FIB4 SCORE: 0.93

## 2023-02-01 ASSESSMENT — PAIN DESCRIPTION - PAIN TYPE: TYPE: ACUTE PAIN

## 2023-02-01 ASSESSMENT — LIFESTYLE VARIABLES: SUBSTANCE_ABUSE: 0

## 2023-02-02 ENCOUNTER — APPOINTMENT (OUTPATIENT)
Dept: RADIOLOGY | Facility: MEDICAL CENTER | Age: 63
DRG: 378 | End: 2023-02-02
Attending: HOSPITALIST
Payer: COMMERCIAL

## 2023-02-02 ENCOUNTER — ANESTHESIA (OUTPATIENT)
Dept: SURGERY | Facility: MEDICAL CENTER | Age: 63
DRG: 378 | End: 2023-02-02
Payer: COMMERCIAL

## 2023-02-02 ENCOUNTER — APPOINTMENT (OUTPATIENT)
Dept: CARDIOLOGY | Facility: MEDICAL CENTER | Age: 63
DRG: 378 | End: 2023-02-02
Attending: HOSPITALIST
Payer: COMMERCIAL

## 2023-02-02 ENCOUNTER — APPOINTMENT (OUTPATIENT)
Dept: RADIOLOGY | Facility: MEDICAL CENTER | Age: 63
DRG: 378 | End: 2023-02-02
Attending: STUDENT IN AN ORGANIZED HEALTH CARE EDUCATION/TRAINING PROGRAM
Payer: COMMERCIAL

## 2023-02-02 ENCOUNTER — ANESTHESIA EVENT (OUTPATIENT)
Dept: SURGERY | Facility: MEDICAL CENTER | Age: 63
DRG: 378 | End: 2023-02-02
Payer: COMMERCIAL

## 2023-02-02 PROBLEM — E78.00 HYPERCHOLESTEREMIA: Status: ACTIVE | Noted: 2023-02-02

## 2023-02-02 LAB
ALBUMIN SERPL BCP-MCNC: 4 G/DL (ref 3.2–4.9)
ALBUMIN/GLOB SERPL: 1.7 G/DL
ALP SERPL-CCNC: 46 U/L (ref 30–99)
ALT SERPL-CCNC: 15 U/L (ref 2–50)
ANION GAP SERPL CALC-SCNC: 7 MMOL/L (ref 7–16)
ANION GAP SERPL CALC-SCNC: 8 MMOL/L (ref 7–16)
APTT PPP: 22.1 SEC (ref 24.7–36)
AST SERPL-CCNC: 16 U/L (ref 12–45)
BASOPHILS # BLD AUTO: 0.1 % (ref 0–1.8)
BASOPHILS # BLD: 0.01 K/UL (ref 0–0.12)
BILIRUB SERPL-MCNC: 0.4 MG/DL (ref 0.1–1.5)
BUN SERPL-MCNC: 12 MG/DL (ref 8–22)
BUN SERPL-MCNC: 8 MG/DL (ref 8–22)
CALCIUM ALBUM COR SERPL-MCNC: 8.9 MG/DL (ref 8.5–10.5)
CALCIUM SERPL-MCNC: 8.9 MG/DL (ref 8.5–10.5)
CALCIUM SERPL-MCNC: 8.9 MG/DL (ref 8.5–10.5)
CHLORIDE SERPL-SCNC: 108 MMOL/L (ref 96–112)
CHLORIDE SERPL-SCNC: 108 MMOL/L (ref 96–112)
CHOLEST SERPL-MCNC: 256 MG/DL (ref 100–199)
CO2 SERPL-SCNC: 23 MMOL/L (ref 20–33)
CO2 SERPL-SCNC: 24 MMOL/L (ref 20–33)
CREAT SERPL-MCNC: 0.73 MG/DL (ref 0.5–1.4)
CREAT SERPL-MCNC: 0.78 MG/DL (ref 0.5–1.4)
EOSINOPHIL # BLD AUTO: 0 K/UL (ref 0–0.51)
EOSINOPHIL NFR BLD: 0 % (ref 0–6.9)
ERYTHROCYTE [DISTWIDTH] IN BLOOD BY AUTOMATED COUNT: 46 FL (ref 35.9–50)
GFR SERPLBLD CREATININE-BSD FMLA CKD-EPI: 87 ML/MIN/1.73 M 2
GFR SERPLBLD CREATININE-BSD FMLA CKD-EPI: 94 ML/MIN/1.73 M 2
GLOBULIN SER CALC-MCNC: 2.4 G/DL (ref 1.9–3.5)
GLUCOSE SERPL-MCNC: 107 MG/DL (ref 65–99)
GLUCOSE SERPL-MCNC: 130 MG/DL (ref 65–99)
HCT VFR BLD AUTO: 38.8 % (ref 37–47)
HDLC SERPL-MCNC: 56 MG/DL
HGB BLD-MCNC: 12.2 G/DL (ref 12–16)
HGB BLD-MCNC: 13 G/DL (ref 12–16)
IMM GRANULOCYTES # BLD AUTO: 0.03 K/UL (ref 0–0.11)
IMM GRANULOCYTES NFR BLD AUTO: 0.3 % (ref 0–0.9)
INR PPP: 1.02 (ref 0.87–1.13)
LDLC SERPL CALC-MCNC: 187 MG/DL
LYMPHOCYTES # BLD AUTO: 1.14 K/UL (ref 1–4.8)
LYMPHOCYTES NFR BLD: 11.9 % (ref 22–41)
MCH RBC QN AUTO: 29.5 PG (ref 27–33)
MCHC RBC AUTO-ENTMCNC: 31.4 G/DL (ref 33.6–35)
MCV RBC AUTO: 93.7 FL (ref 81.4–97.8)
MONOCYTES # BLD AUTO: 0.34 K/UL (ref 0–0.85)
MONOCYTES NFR BLD AUTO: 3.6 % (ref 0–13.4)
NEUTROPHILS # BLD AUTO: 8.05 K/UL (ref 2–7.15)
NEUTROPHILS NFR BLD: 84.1 % (ref 44–72)
NRBC # BLD AUTO: 0 K/UL
NRBC BLD-RTO: 0 /100 WBC
PLATELET # BLD AUTO: 246 K/UL (ref 164–446)
PMV BLD AUTO: 10.7 FL (ref 9–12.9)
POTASSIUM SERPL-SCNC: 4.7 MMOL/L (ref 3.6–5.5)
POTASSIUM SERPL-SCNC: 5.5 MMOL/L (ref 3.6–5.5)
PROT SERPL-MCNC: 6.4 G/DL (ref 6–8.2)
PROTHROMBIN TIME: 13.3 SEC (ref 12–14.6)
RBC # BLD AUTO: 4.14 M/UL (ref 4.2–5.4)
SODIUM SERPL-SCNC: 138 MMOL/L (ref 135–145)
SODIUM SERPL-SCNC: 140 MMOL/L (ref 135–145)
TRIGL SERPL-MCNC: 64 MG/DL (ref 0–149)
WBC # BLD AUTO: 9.6 K/UL (ref 4.8–10.8)

## 2023-02-02 PROCEDURE — 0DJ08ZZ INSPECTION OF UPPER INTESTINAL TRACT, VIA NATURAL OR ARTIFICIAL OPENING ENDOSCOPIC: ICD-10-PCS | Performed by: INTERNAL MEDICINE

## 2023-02-02 PROCEDURE — 160002 HCHG RECOVERY MINUTES (STAT): Performed by: INTERNAL MEDICINE

## 2023-02-02 PROCEDURE — 700111 HCHG RX REV CODE 636 W/ 250 OVERRIDE (IP): Performed by: ANESTHESIOLOGY

## 2023-02-02 PROCEDURE — 160035 HCHG PACU - 1ST 60 MINS PHASE I: Performed by: INTERNAL MEDICINE

## 2023-02-02 PROCEDURE — 85025 COMPLETE CBC W/AUTO DIFF WBC: CPT

## 2023-02-02 PROCEDURE — 36415 COLL VENOUS BLD VENIPUNCTURE: CPT

## 2023-02-02 PROCEDURE — 80053 COMPREHEN METABOLIC PANEL: CPT

## 2023-02-02 PROCEDURE — 99221 1ST HOSP IP/OBS SF/LOW 40: CPT | Performed by: INTERNAL MEDICINE

## 2023-02-02 PROCEDURE — 96375 TX/PRO/DX INJ NEW DRUG ADDON: CPT

## 2023-02-02 PROCEDURE — 74018 RADEX ABDOMEN 1 VIEW: CPT

## 2023-02-02 PROCEDURE — 87040 BLOOD CULTURE FOR BACTERIA: CPT

## 2023-02-02 PROCEDURE — 99233 SBSQ HOSP IP/OBS HIGH 50: CPT | Mod: GC | Performed by: HOSPITALIST

## 2023-02-02 PROCEDURE — 700105 HCHG RX REV CODE 258: Performed by: ANESTHESIOLOGY

## 2023-02-02 PROCEDURE — 700102 HCHG RX REV CODE 250 W/ 637 OVERRIDE(OP): Performed by: STUDENT IN AN ORGANIZED HEALTH CARE EDUCATION/TRAINING PROGRAM

## 2023-02-02 PROCEDURE — 96366 THER/PROPH/DIAG IV INF ADDON: CPT

## 2023-02-02 PROCEDURE — 700111 HCHG RX REV CODE 636 W/ 250 OVERRIDE (IP): Performed by: HOSPITALIST

## 2023-02-02 PROCEDURE — 700117 HCHG RX CONTRAST REV CODE 255: Performed by: STUDENT IN AN ORGANIZED HEALTH CARE EDUCATION/TRAINING PROGRAM

## 2023-02-02 PROCEDURE — 80048 BASIC METABOLIC PNL TOTAL CA: CPT

## 2023-02-02 PROCEDURE — 700102 HCHG RX REV CODE 250 W/ 637 OVERRIDE(OP): Performed by: HOSPITALIST

## 2023-02-02 PROCEDURE — 85018 HEMOGLOBIN: CPT

## 2023-02-02 PROCEDURE — 85730 THROMBOPLASTIN TIME PARTIAL: CPT

## 2023-02-02 PROCEDURE — 160048 HCHG OR STATISTICAL LEVEL 1-5: Performed by: INTERNAL MEDICINE

## 2023-02-02 PROCEDURE — 700101 HCHG RX REV CODE 250: Performed by: STUDENT IN AN ORGANIZED HEALTH CARE EDUCATION/TRAINING PROGRAM

## 2023-02-02 PROCEDURE — 160202 HCHG ENDO MINUTES - 1ST 30 MINS LEVEL 3: Performed by: INTERNAL MEDICINE

## 2023-02-02 PROCEDURE — 00731 ANES UPR GI NDSC PX NOS: CPT | Performed by: ANESTHESIOLOGY

## 2023-02-02 PROCEDURE — 700111 HCHG RX REV CODE 636 W/ 250 OVERRIDE (IP): Performed by: STUDENT IN AN ORGANIZED HEALTH CARE EDUCATION/TRAINING PROGRAM

## 2023-02-02 PROCEDURE — 700105 HCHG RX REV CODE 258: Performed by: STUDENT IN AN ORGANIZED HEALTH CARE EDUCATION/TRAINING PROGRAM

## 2023-02-02 PROCEDURE — 160009 HCHG ANES TIME/MIN: Performed by: INTERNAL MEDICINE

## 2023-02-02 PROCEDURE — 96376 TX/PRO/DX INJ SAME DRUG ADON: CPT

## 2023-02-02 PROCEDURE — A9270 NON-COVERED ITEM OR SERVICE: HCPCS | Performed by: HOSPITALIST

## 2023-02-02 PROCEDURE — 96367 TX/PROPH/DG ADDL SEQ IV INF: CPT

## 2023-02-02 PROCEDURE — G0378 HOSPITAL OBSERVATION PER HR: HCPCS

## 2023-02-02 PROCEDURE — 85610 PROTHROMBIN TIME: CPT

## 2023-02-02 PROCEDURE — 80061 LIPID PANEL: CPT

## 2023-02-02 PROCEDURE — A9270 NON-COVERED ITEM OR SERVICE: HCPCS | Performed by: STUDENT IN AN ORGANIZED HEALTH CARE EDUCATION/TRAINING PROGRAM

## 2023-02-02 PROCEDURE — C9113 INJ PANTOPRAZOLE SODIUM, VIA: HCPCS | Performed by: HOSPITALIST

## 2023-02-02 PROCEDURE — 74177 CT ABD & PELVIS W/CONTRAST: CPT

## 2023-02-02 PROCEDURE — 700105 HCHG RX REV CODE 258: Performed by: HOSPITALIST

## 2023-02-02 RX ORDER — SODIUM CHLORIDE, SODIUM LACTATE, POTASSIUM CHLORIDE, CALCIUM CHLORIDE 600; 310; 30; 20 MG/100ML; MG/100ML; MG/100ML; MG/100ML
INJECTION, SOLUTION INTRAVENOUS
Status: DISCONTINUED | OUTPATIENT
Start: 2023-02-02 | End: 2023-02-02

## 2023-02-02 RX ORDER — ONDANSETRON 2 MG/ML
INJECTION INTRAMUSCULAR; INTRAVENOUS PRN
Status: DISCONTINUED | OUTPATIENT
Start: 2023-02-02 | End: 2023-02-04 | Stop reason: SURG

## 2023-02-02 RX ORDER — IPRATROPIUM BROMIDE AND ALBUTEROL SULFATE 2.5; .5 MG/3ML; MG/3ML
3 SOLUTION RESPIRATORY (INHALATION)
Status: DISCONTINUED | OUTPATIENT
Start: 2023-02-02 | End: 2023-02-02 | Stop reason: HOSPADM

## 2023-02-02 RX ORDER — SODIUM CHLORIDE 9 MG/ML
INJECTION, SOLUTION INTRAVENOUS CONTINUOUS
Status: DISCONTINUED | OUTPATIENT
Start: 2023-02-02 | End: 2023-02-03

## 2023-02-02 RX ORDER — HALOPERIDOL 5 MG/ML
1 INJECTION INTRAMUSCULAR
Status: DISCONTINUED | OUTPATIENT
Start: 2023-02-02 | End: 2023-02-02 | Stop reason: HOSPADM

## 2023-02-02 RX ORDER — DIPHENHYDRAMINE HYDROCHLORIDE 50 MG/ML
12.5 INJECTION INTRAMUSCULAR; INTRAVENOUS
Status: DISCONTINUED | OUTPATIENT
Start: 2023-02-02 | End: 2023-02-02 | Stop reason: HOSPADM

## 2023-02-02 RX ORDER — MAGNESIUM SULFATE HEPTAHYDRATE 40 MG/ML
2 INJECTION, SOLUTION INTRAVENOUS ONCE
Status: COMPLETED | OUTPATIENT
Start: 2023-02-02 | End: 2023-02-02

## 2023-02-02 RX ORDER — METRONIDAZOLE 500 MG/100ML
500 INJECTION, SOLUTION INTRAVENOUS EVERY 8 HOURS
Status: COMPLETED | OUTPATIENT
Start: 2023-02-02 | End: 2023-02-03

## 2023-02-02 RX ORDER — METRONIDAZOLE 500 MG/100ML
500 INJECTION, SOLUTION INTRAVENOUS EVERY 8 HOURS
Status: DISCONTINUED | OUTPATIENT
Start: 2023-02-02 | End: 2023-02-02

## 2023-02-02 RX ORDER — ROSUVASTATIN CALCIUM 5 MG/1
20 TABLET, COATED ORAL EVERY EVENING
Status: DISCONTINUED | OUTPATIENT
Start: 2023-02-02 | End: 2023-02-06 | Stop reason: HOSPADM

## 2023-02-02 RX ORDER — SODIUM CHLORIDE, SODIUM LACTATE, POTASSIUM CHLORIDE, CALCIUM CHLORIDE 600; 310; 30; 20 MG/100ML; MG/100ML; MG/100ML; MG/100ML
INJECTION, SOLUTION INTRAVENOUS CONTINUOUS
Status: DISCONTINUED | OUTPATIENT
Start: 2023-02-02 | End: 2023-02-02

## 2023-02-02 RX ADMIN — IOHEXOL 100 ML: 350 INJECTION, SOLUTION INTRAVENOUS at 21:25

## 2023-02-02 RX ADMIN — SODIUM CHLORIDE: 9 INJECTION, SOLUTION INTRAVENOUS at 14:55

## 2023-02-02 RX ADMIN — ROSUVASTATIN CALCIUM 20 MG: 5 TABLET, COATED ORAL at 18:09

## 2023-02-02 RX ADMIN — METRONIDAZOLE 500 MG: 5 INJECTION, SOLUTION INTRAVENOUS at 21:43

## 2023-02-02 RX ADMIN — SENNOSIDES AND DOCUSATE SODIUM 2 TABLET: 50; 8.6 TABLET ORAL at 16:39

## 2023-02-02 RX ADMIN — POTASSIUM CHLORIDE 40 MEQ: 1500 TABLET, EXTENDED RELEASE ORAL at 00:17

## 2023-02-02 RX ADMIN — SODIUM CHLORIDE, POTASSIUM CHLORIDE, SODIUM LACTATE AND CALCIUM CHLORIDE: 600; 310; 30; 20 INJECTION, SOLUTION INTRAVENOUS at 02:29

## 2023-02-02 RX ADMIN — MAGNESIUM SULFATE HEPTAHYDRATE 2 G: 40 INJECTION, SOLUTION INTRAVENOUS at 08:03

## 2023-02-02 RX ADMIN — PANTOPRAZOLE SODIUM 40 MG: 40 INJECTION, POWDER, LYOPHILIZED, FOR SOLUTION INTRAVENOUS at 14:49

## 2023-02-02 RX ADMIN — PROMETHAZINE HYDROCHLORIDE 12.5 MG: 25 TABLET ORAL at 03:37

## 2023-02-02 RX ADMIN — ACETAMINOPHEN 650 MG: 325 TABLET, FILM COATED ORAL at 15:11

## 2023-02-02 RX ADMIN — PROPOFOL 150 MCG/KG/MIN: 10 INJECTION, EMULSION INTRAVENOUS at 13:33

## 2023-02-02 RX ADMIN — IOHEXOL 25 ML: 240 INJECTION, SOLUTION INTRATHECAL; INTRAVASCULAR; INTRAVENOUS; ORAL at 18:45

## 2023-02-02 RX ADMIN — PROCHLORPERAZINE EDISYLATE 5 MG: 5 INJECTION INTRAMUSCULAR; INTRAVENOUS at 02:37

## 2023-02-02 RX ADMIN — PANTOPRAZOLE SODIUM 40 MG: 40 INJECTION, POWDER, LYOPHILIZED, FOR SOLUTION INTRAVENOUS at 00:16

## 2023-02-02 RX ADMIN — ONDANSETRON 4 MG: 2 INJECTION INTRAMUSCULAR; INTRAVENOUS at 13:36

## 2023-02-02 RX ADMIN — SODIUM CHLORIDE, POTASSIUM CHLORIDE, SODIUM LACTATE AND CALCIUM CHLORIDE: 600; 310; 30; 20 INJECTION, SOLUTION INTRAVENOUS at 13:30

## 2023-02-02 ASSESSMENT — ENCOUNTER SYMPTOMS
CHILLS: 0
FALLS: 0
HEADACHES: 0
NAUSEA: 0
WHEEZING: 0
ABDOMINAL PAIN: 1
COUGH: 0
BACK PAIN: 0
PALPITATIONS: 1
FEVER: 0
MYALGIAS: 0
DIZZINESS: 0
SHORTNESS OF BREATH: 0
BLOOD IN STOOL: 1
WEIGHT LOSS: 0
VOMITING: 0

## 2023-02-02 ASSESSMENT — PATIENT HEALTH QUESTIONNAIRE - PHQ9
1. LITTLE INTEREST OR PLEASURE IN DOING THINGS: NOT AT ALL
1. LITTLE INTEREST OR PLEASURE IN DOING THINGS: NOT AT ALL
2. FEELING DOWN, DEPRESSED, IRRITABLE, OR HOPELESS: NOT AT ALL
2. FEELING DOWN, DEPRESSED, IRRITABLE, OR HOPELESS: NOT AT ALL
SUM OF ALL RESPONSES TO PHQ9 QUESTIONS 1 AND 2: 0
SUM OF ALL RESPONSES TO PHQ9 QUESTIONS 1 AND 2: 0

## 2023-02-02 ASSESSMENT — LIFESTYLE VARIABLES
EVER HAD A DRINK FIRST THING IN THE MORNING TO STEADY YOUR NERVES TO GET RID OF A HANGOVER: NO
ON A TYPICAL DAY WHEN YOU DRINK ALCOHOL HOW MANY DRINKS DO YOU HAVE: 1
TOTAL SCORE: 0
HAVE YOU EVER FELT YOU SHOULD CUT DOWN ON YOUR DRINKING: NO
ALCOHOL_USE: NO
EVER FELT BAD OR GUILTY ABOUT YOUR DRINKING: NO
CONSUMPTION TOTAL: NEGATIVE
TOTAL SCORE: 0
TOTAL SCORE: 0
HAVE PEOPLE ANNOYED YOU BY CRITICIZING YOUR DRINKING: NO
AVERAGE NUMBER OF DAYS PER WEEK YOU HAVE A DRINK CONTAINING ALCOHOL: 1
DOES PATIENT WANT TO STOP DRINKING: NO
HOW MANY TIMES IN THE PAST YEAR HAVE YOU HAD 5 OR MORE DRINKS IN A DAY: 0

## 2023-02-02 ASSESSMENT — PAIN DESCRIPTION - PAIN TYPE
TYPE: ACUTE PAIN
TYPE: ACUTE PAIN

## 2023-02-02 ASSESSMENT — FIBROSIS 4 INDEX: FIB4 SCORE: 1.01

## 2023-02-02 NOTE — ED TRIAGE NOTES
"Laverne Zainab Riojas  62 y.o. female    Chief Complaint   Patient presents with    Lightheadedness    Abdominal Cramps     PT arrives via EMS with complaints of near syncopal event that occurred when pt was having bowel movement tonight. Pt states she began to feel lightheaded, diaphoretic, and had tinnitus- pt believes she did lose consciousness on toliet. Denies falls. Reports low abdominal \"pressure\" and cramping while having BM.     BP on EMS arrival 80/50. Given 300cc NS by medics.     Reports feeling somewhat improved on triage. Up to commode to have second BM. No blood noted in stool.    "

## 2023-02-02 NOTE — NON-PROVIDER
Hospital Medicine History and Physical    Date of Service  2/2/2023    Chief Complaint  Chief Complaint   Patient presents with    Lightheadedness    Abdominal Cramps       History of Presenting Illness  62 y.o. female with a PMHx of obesity and depression, who presented 2/1/2023 to the ED due to a pre-syncopal episode with associated abdominal pain and cramping, palpitations, and bloody stools. Patient states that she was at [employer] when she began experiencing sudden, intense abdominal cramping in the lower left abdominal quadrant. Patient states she went to the bathroom, where she began experiencing pre-syncope during her bowel movement, but states she did not lose consciousness. She further states that she has had increased episodes of this pain over the past month with associated abnormal palpitations. However, no previous episode was this painful or resulted in syncope. She does not have a history of GI bleeds, and denies any recent sick contacts or illness. She has mild alcohol use, denies using tobacco, and smokes marijuana. There is no family history GI malignancy, ulcerative colitis, or Crohn disease. She denies fever, chills, fatigue, diaphoresis, chest pain, SOB, cough, diarrhea, constipation, dysuria, or hematuria.     Primary Care Physician  Annmarie De Oliveira M.D.    Code Status  Full    Review of Systems  ROS    Gen: denies fever, chills, weight loss/gain, fatigue   HEENT: denies headache, vertigo  Pulm: denies cough, wheeze, SOB, dyspnea   CV: positive for dizziness, lightheadedness, palpitations; denies diaphoresis, angina  GI: positive for abdominal pain and cramping in LLQ; denies change in appetite, nausea, vomiting, diarrhea, constipation   Neuro: denies numbness, tingling, weakness  Genital: denies discharge, bleeding, pain, pruritus  : denies dysuria, hematuria, urinary incontinence, retention    Past Medical History  Past Medical History:   Diagnosis Date    Depression     Obesity        Surgical  History  Past Surgical History:   Procedure Laterality Date    TONSILLECTOMY         Medications  - Ceftriaxone 2,000 mg Q 24 HRS   - Metronidazole IVPB 500 mg Q 8 HRS   - Ondansetron PRN  - Rosuvastatin 20 mg Q evening   - NS infusion continuous      Current Outpatient Medications on File Prior to Encounter   Medication Sig Dispense Refill    therapeutic multivitamin-minerals (THERAGRAN-M) Tab Take 1 Tablet by mouth every day.      Dextromethorphan-guaiFENesin (TUSSIN DM)  MG/5ML Syrup Take 5 mL by mouth every 6 hours as needed (cough). 118 mL 0    cyclobenzaprine (FLEXERIL) 5 mg tablet Take 1 Tablet by mouth 3 times a day as needed for Muscle Spasms. 30 Tablet 0    albuterol 108 (90 Base) MCG/ACT Aero Soln inhalation aerosol Inhale 2 Puffs every four hours as needed for Shortness of Breath. 1 Each 0    ibuprofen (MOTRIN) 800 MG Tab Take 1 Tablet by mouth every 8 hours as needed for Headache or Mild Pain. 90 Tablet 0    zinc sulfate (ZINCATE) 220 (50 Zn) MG Cap Take 220 mg by mouth every day.      vitamin D3 (CHOLECALCIFEROL) 1000 Unit (25 mcg) Tab Take 2,000 Units by mouth every day.      Omega-3 Fatty Acids (FISH OIL) 1000 MG Cap capsule Take 1,000 mg by mouth 2 times a day.         Family History  Family History   Problem Relation Age of Onset    Arthritis Mother     Stroke Mother     Sleep Apnea Mother     Sleep Apnea Brother     Cancer Maternal Uncle        Social History  Social History     Tobacco Use    Smoking status: Former     Packs/day: 0.25     Years: 25.00     Pack years: 6.25     Types: Cigarettes     Quit date: 2002     Years since quittin.5    Smokeless tobacco: Never   Vaping Use    Vaping Use: Never used   Substance Use Topics    Alcohol use: Yes     Comment: occ    Drug use: Yes     Types: Marijuana     Comment: smoking and edibles daily for pain       Allergies  No Known Allergies     Physical Exam  Laboratory   Hemodynamics  Temp (24hrs), Av.4 °C (97.6 °F), Min:36.1 °C (97  °F), Max:36.9 °C (98.4 °F)   Temperature: 36.9 °C (98.4 °F)  Pulse  Av.3  Min: 56  Max: 80    Blood Pressure: (!) 161/74      Respiratory      Respiration: 18, Pulse Oximetry: 92 %        RUL Breath Sounds: Clear, RML Breath Sounds: Clear;Diminished, RLL Breath Sounds: Diminished, SAIMA Breath Sounds: Clear, LLL Breath Sounds: Clear    Physical Exam    Gen: NAD; well-appearing; alert and interactive  HEENT: NC/AT; PERRL  Pulm: normal WOB; no wheezes, rhonchi, or rales; CTAB  CV: normal rate and rhythm; S1/S2 auscultated; no murmurs, rubs, or gallops   Abd: tenderness to palpation in the lower left quadrant; decreased bowel sounds   Neuro: CN II-XII grossly intact; Aox4; normal strength; normal sensation   Recent Labs     23   WBC 7.7 9.6   RBC 4.30 4.14*   HEMOGLOBIN 12.7 12.2   HEMATOCRIT 39.6 38.8   MCV 92.1 93.7   MCH 29.5 29.5   MCHC 32.1* 31.4*   RDW 46.2 46.0   PLATELETCT 275 246   MPV 10.9 10.7     Recent Labs     23  1023   SODIUM 143 138 140   POTASSIUM 3.6 5.5 4.7   CHLORIDE 110 108 108   CO2 22 23 24   GLUCOSE 110* 130* 107*   BUN 17 12 8   CREATININE 0.99 0.78 0.73   CALCIUM 8.9 8.9 8.9     Recent Labs     237 23  1023   ALTSGPT 19 15  --    ASTSGOT 18 16  --    ALKPHOSPHAT 47 46  --    TBILIRUBIN 0.3 0.4  --    GLUCOSE 110* 130* 107*     Recent Labs     23   APTT 22.1*   INR 1.02         Recent Labs     23   TRIGLYCERIDE 64   HDL 56   *     No results found for: TROPONINI  Urinalysis:  Lab Results   Component Value Date/Time    SPECGRAVITY 1.026 2023    GLUCOSEUR Negative 2023 2141    KETONES Trace (A) 2023 2141    NITRITE Negative 2023 2141    WBCURINE 0-2 2023 2141    RBCURINE 0-2 2023 2141    BACTERIA Negative 2023    EPITHELCELL Negative 20231        Imaging    Esophagogastroduodenoscopy 23  - No bleeding  source found  - Esophagus demonstrates Z line around 35 cm; Hiatal Hernia 5 cm in length; some irregular mucosa with GERD grade B at least  - Stomach: some erosions around hiatal hernia; no ulcer   - Duodenum: grossly normal     DX-Abdomen 2/2/23   - No evidence of bowel obstruction, free air, or suspicious intra-abdominal calcification    DX-Chest 2/1/23  - Heart size within normal limits   - No focal infiltrates or consolidations are identified in lungs   - No pleural fluid collections are identified   - No pneumothorax appreciated    Assessment/Plan     Patient is a 63 YO female admitted for pre-syncopal episode associated with severe abdominal cramping and pain, bloody stools, and palpitations     GI Bleed   - EGD 2/2/23 did not find a source of bleeding in the upper GI tract, however patient continues to have bloody stools; nurse notes that the patient has had 4-5 bright bloody stools overnight; patient had an additional bright bloody stool during EGD   - Given patient's description of LLE pain and cramping, diverticulitis is suspected   - Patients H&H are within normal limits, making this more likely to be an acute bleed rather than chronic   - CT Abdomen-Pelvis will pending to evaluate for diverticulitis; pending results of CT, emergent colonoscopy will be ordered to evaluate for diverticulitis and to rule out malignancy   - Continue IV LR infusion   - Continue IV Protonix infusion   - Monitor H&H every 8 hours; transfuse for Hb less than 7    Postural Dizziness with Presyncope   - Patient had nonsustained V. Tach in ED, and states that she has had intermittent palpitations over the last month  - Echo is currently pending   - Continue to monitor on telemetry     Nonsustained Ventricular Tachycardia   - Patient had 5 beats of nonsustained V. Tach in ED, and she states she has had intermittent palpitations over the last month  - Echo is currently pending  - Continue to monitor on telemetry   - Keep Potassium  above 4 and Magnesium above 2       Assessment & plan notes cannot be loaded without a specified hospital service.      VTE prophylaxis:

## 2023-02-02 NOTE — ASSESSMENT & PLAN NOTE
-Consider secondary to GIB/dehydration versus bradycardia versus vasovagal syncope secondary to bearing down during bowel movement  - Orthostatic blood pressure negative  - Encourage oral hydration

## 2023-02-02 NOTE — PROGRESS NOTES
[facility] Internal Medicine Daily Progress Note    Date of Service  2/2/2023    [facility] Team: R IM Green Team   Attending: KELVIN Hicks M.d.  Senior Resident: Dr. Santacruz  Intern:  Dr. Lucero  Contact Number: [phone]    Chief Complaint  Chief Complaint   Patient presents with    Lightheadedness    Abdominal Cramps       Hospital Course  Patient is a 62 year-old female with a past medical history of depression and obesity who presented on 2/1/23 due to dizziness, palpitations, and abdominal cramping. She notes one day sudden onset of dizziness/SOB, lightheadedness but denies loss of consciousness of head injury. Of note, patient also notes intermittently occurring episodes of palpitations. While in the ED, patient had a bloody bowel movement, first occurrence. Was prescribed ibuprofen 800mg three times daily, but denies use in the last week. Denies anticoagulation or ASA use. Reports recent colonoscopy (within the last few years) with normal findings. She denies fever, chills, weight changes, fatigue, N/V, changes in appetite, changes in stool or family history of colon cancer. Five beats of vtach was noted while in ED, EKG revealed first degree heart block. CXR negative for acute cardiopulmonary processes. She was started on IV PPI twice daily. H&H trended.    Around five episodes of bloody stools noted since admission. GI consulted for ongoing GI bleed. For EGD today (2/2/2023).    Interval Problem Update  Patient denied ongoing dizziness this AM but had not gotten up yet. She reported ongoing cramping left sided abdominal pain and loose bowel movements, however, reports first occurrence of bloody stools. Telemetry review showed sinus deyvi, no recurrence of vtach.  Labs did not show leukocytosis, however, with notable left shift. Will get blood cultures x2 and empirically treat with IV Rocephin and Flagyl for concerns of diverticulitis.   AM labs with high normal K at 5.5. Repeat done showed K 4.7.    S/P  EGD today, per GI - no bleeding source found. Recommends for CT to further evaluate for diverticulitis.    I have discussed this patient's plan of care and discharge plan at IDT rounds today with Case Management, Nursing, Nursing leadership, and other members of the IDT team.    Consultants/Specialty  GI    Code Status  Full Code    Disposition  Patient is not medically cleared for discharge.   Anticipate discharge to to home with close outpatient follow-up.  I have placed the appropriate orders for post-discharge needs.    Review of Systems  Review of Systems   Constitutional:  Negative for chills, fever, malaise/fatigue and weight loss.   Respiratory:  Negative for cough, shortness of breath and wheezing.    Cardiovascular:  Positive for palpitations (occasional). Negative for chest pain and leg swelling.   Gastrointestinal:  Positive for abdominal pain (crampy, left sided) and blood in stool. Negative for nausea and vomiting.   Genitourinary:  Negative for hematuria.   Musculoskeletal:  Negative for back pain, falls and myalgias.   Neurological:  Negative for dizziness and headaches.      Physical Exam  Temp:  [36.1 °C (97 °F)-36.9 °C (98.4 °F)] 36.9 °C (98.4 °F)  Pulse:  [56-80] 73  Resp:  [16-20] 20  BP: (102-161)/(51-85) 130/60  SpO2:  [92 %-99 %] 92 %    Physical Exam  Constitutional:       General: She is not in acute distress.     Appearance: She is not ill-appearing or toxic-appearing.   HENT:      Head: Normocephalic and atraumatic.      Right Ear: External ear normal.      Left Ear: External ear normal.      Nose: Nose normal. No rhinorrhea.      Mouth/Throat:      Mouth: Mucous membranes are moist.   Eyes:      General: No scleral icterus.        Right eye: No discharge.         Left eye: No discharge.      Extraocular Movements: Extraocular movements intact.      Conjunctiva/sclera: Conjunctivae normal.   Cardiovascular:      Rate and Rhythm: Normal rate and regular rhythm.      Pulses: Normal pulses.       Heart sounds: No murmur heard.  Pulmonary:      Effort: Pulmonary effort is normal. No respiratory distress.      Breath sounds: No wheezing.   Abdominal:      General: Bowel sounds are normal.      Palpations: Abdomen is soft.      Tenderness: There is abdominal tenderness (general, but more LLQ). There is no guarding or rebound.      Hernia: No hernia is present.   Musculoskeletal:         General: No swelling. Normal range of motion.      Cervical back: Normal range of motion.   Skin:     General: Skin is warm and dry.   Neurological:      General: No focal deficit present.      Mental Status: She is alert and oriented to person, place, and time.   Psychiatric:         Mood and Affect: Mood normal.         Behavior: Behavior normal.         Thought Content: Thought content normal.         Judgment: Judgment normal.       Fluids    Intake/Output Summary (Last 24 hours) at 2/2/2023 1415  Last data filed at 2/2/2023 1333  Gross per 24 hour   Intake 1050 ml   Output --   Net 1050 ml       Laboratory  Recent Labs     02/01/23 1948 02/02/23 0447   WBC 7.7 9.6   RBC 4.30 4.14*   HEMOGLOBIN 12.7 12.2   HEMATOCRIT 39.6 38.8   MCV 92.1 93.7   MCH 29.5 29.5   MCHC 32.1* 31.4*   RDW 46.2 46.0   PLATELETCT 275 246   MPV 10.9 10.7     Recent Labs     02/01/23 1948 02/02/23 0447 02/02/23  1023   SODIUM 143 138 140   POTASSIUM 3.6 5.5 4.7   CHLORIDE 110 108 108   CO2 22 23 24   GLUCOSE 110* 130* 107*   BUN 17 12 8   CREATININE 0.99 0.78 0.73   CALCIUM 8.9 8.9 8.9     Recent Labs     02/02/23 0447   APTT 22.1*   INR 1.02         Recent Labs     02/02/23 0447   TRIGLYCERIDE 64   HDL 56   *       Imaging  [id number] VIEW   Final Result      1.  Nonspecific bowel gas pattern.      DX-CHEST-PORTABLE (1 VIEW)   Final Result         No acute cardiac or pulmonary abnormality is identified.      EC-ECHOCARDIOGRAM COMPLETE W/O CONT    (Results Pending)   CT-ABDOMEN-PELVIS WITH & W/O    (Results Pending)         Assessment/Plan  Problem Representation:    GI bleed  Assessment & Plan  Has had multiple episodes of bloody stool, noted since admission  -H&H stable, continue to monitor regularly  -Type and cross match. Transfuse Hgb if <7  -IV Protonix BID  -IVF  -GI consulted, appreciate recs   S/p EGD (2/2/23): no bleeding source found. GERD grade B. Consider repeat EGD in 2-3 months to assess for Ferguson's esophagus   For CT abd/pelvis with and without contrast to assess for diverticulitis - ordered and pending  -Concerns for diverticulitis given symptomatology, physical exam and lab finding with neutrophil predominance, consider diverticulitis. For blood cultures x2 and will start empiric treatment with IV Rocephin and Flagyl    Nonsustained ventricular tachycardia  Assessment & Plan  Consider secondary to GIB/volume depletion.  -Telemetry  -Cardiac echo pending  -Monitor electrolytes, keep potassium above 4 and magnesium above 2    Hypercholesteremia  Assessment & Plan  -Start Crestor 20mg    Postural dizziness with presyncope- (present on admission)  Assessment & Plan  Consider secondary to GIB/dehydration or bradycardia  Noted 5 beats of NSVT noted on admission  No recurrence noted on telemetry  -CTM         VTE prophylaxis: SCDs/TEDs due to concerns for GIB    I have performed a physical exam and reviewed and updated ROS and Plan today (2/2/2023). In review of yesterday's note (2/1/2023), there are no changes except as documented above.

## 2023-02-02 NOTE — ED PROVIDER NOTES
ER Provider Note    Scribed for Efrain Cameron M.D. by Maximiliano Lomas. 2/1/2023  7:57 PM    Primary Care Provider: Annmarie De Oliveira M.D.    CHIEF COMPLAINT  Chief Complaint   Patient presents with    Lightheadedness    Abdominal Cramps     LIMITATION TO HISTORY   Select: : None    HPI/ROS  OUTSIDE HISTORIAN(S):  None        Laverne Riojas is a 62 y.o. female who presents to the ED via EMS for acute, mild to moderate lightheadedness onset prior to arrival. The patient believes she had syncopal event when she was having a bowel movement. She says she has been experiencing dizziness since. She has associated symptoms of diarrhea and abdominal pain, but denies chest pain, chest pain, vomiting, cough, sore throat, bloody stools, or dysuria. No alleviating or exacerbating factors reported. Denies history of heart attack or stents. Denies allergies to medications.    PAST MEDICAL HISTORY  Past Medical History:   Diagnosis Date    Depression     Obesity        SURGICAL HISTORY  Past Surgical History:   Procedure Laterality Date    TONSILLECTOMY         FAMILY HISTORY  Family History   Problem Relation Age of Onset    Arthritis Mother     Stroke Mother     Sleep Apnea Mother     Sleep Apnea Brother     Cancer Maternal Uncle        SOCIAL HISTORY   reports that she quit smoking about 20 years ago. Her smoking use included cigarettes. She has a 6.25 pack-year smoking history. She has never used smokeless tobacco. She reports current alcohol use. She reports current drug use. Drug: Marijuana.    CURRENT MEDICATIONS  Previous Medications    ALBUTEROL 108 (90 BASE) MCG/ACT AERO SOLN INHALATION AEROSOL    Inhale 2 Puffs every four hours as needed for Shortness of Breath.    CYCLOBENZAPRINE (FLEXERIL) 5 MG TABLET    Take 1 Tablet by mouth 3 times a day as needed for Muscle Spasms.    DEXTROMETHORPHAN-GUAIFENESIN (TUSSIN DM)  MG/5ML SYRUP    Take 5 mL by mouth every 6 hours as needed (cough).    IBUPROFEN (MOTRIN)  "800 MG TAB    Take 1 Tablet by mouth every 8 hours as needed for Headache or Mild Pain.    OMEGA-3 FATTY ACIDS (FISH OIL) 1000 MG CAP CAPSULE    Take 1,000 mg by mouth 2 times a day.    THERAPEUTIC MULTIVITAMIN-MINERALS (THERAGRAN-M) TAB    Take 1 Tablet by mouth every day.    VITAMIN D3 (CHOLECALCIFEROL) 1000 UNIT (25 MCG) TAB    Take 2,000 Units by mouth every day.    ZINC SULFATE (ZINCATE) 220 (50 ZN) MG CAP    Take 220 mg by mouth every day.       ALLERGIES  Patient has no known allergies.    PHYSICAL EXAM  BP (!) 145/80   Pulse (!) 59   Temp 36.1 °C (97 °F) (Oral)   Resp 20   Ht 1.651 m (5' 5\")   Wt 105 kg (231 lb)   SpO2 95%   BMI 38.44 kg/m²   Constitutional: Well developed, Well nourished, Mild distress.   HENT: Normocephalic, Atraumatic.  Eyes: Conjunctiva normal, No discharge.   Cardiovascular: Normal heart rate, Normal rhythm, No murmurs, equal pulses.   Pulmonary: Normal breath sounds, No respiratory distress, No wheezing, No rales, No rhonchi.  Chest: No chest wall tenderness or deformity.   Abdomen:Soft, No tenderness, No masses, no rebound, no guarding.   Back: No CVA tenderness.   Musculoskeletal: No major deformities noted, No tenderness, No leg edema, No calf tenderness.   Skin: Warm, Dry, No erythema, No rash.   Neurologic: Alert & oriented x 3, Normal motor function,  No focal deficits noted.   Psychiatric: Affect normal, Judgment normal, Mood normal.     DIAGNOSTIC STUDIES & PROCEDURES    Labs:   Results for orders placed or performed during the hospital encounter of 02/01/23   CBC with Differential   Result Value Ref Range    WBC 7.7 4.8 - 10.8 K/uL    RBC 4.30 4.20 - 5.40 M/uL    Hemoglobin 12.7 12.0 - 16.0 g/dL    Hematocrit 39.6 37.0 - 47.0 %    MCV 92.1 81.4 - 97.8 fL    MCH 29.5 27.0 - 33.0 pg    MCHC 32.1 (L) 33.6 - 35.0 g/dL    RDW 46.2 35.9 - 50.0 fL    Platelet Count 275 164 - 446 K/uL    MPV 10.9 9.0 - 12.9 fL    Neutrophils-Polys 48.10 44.00 - 72.00 %    Lymphocytes 44.30 (H) " 22.00 - 41.00 %    Monocytes 4.60 0.00 - 13.40 %    Eosinophils 2.10 0.00 - 6.90 %    Basophils 0.50 0.00 - 1.80 %    Immature Granulocytes 0.40 0.00 - 0.90 %    Nucleated RBC 0.00 /100 WBC    Neutrophils (Absolute) 3.70 2.00 - 7.15 K/uL    Lymphs (Absolute) 3.41 1.00 - 4.80 K/uL    Monos (Absolute) 0.35 0.00 - 0.85 K/uL    Eos (Absolute) 0.16 0.00 - 0.51 K/uL    Baso (Absolute) 0.04 0.00 - 0.12 K/uL    Immature Granulocytes (abs) 0.03 0.00 - 0.11 K/uL    NRBC (Absolute) 0.00 K/uL   Complete Metabolic Panel (CMP)   Result Value Ref Range    Sodium 143 135 - 145 mmol/L    Potassium 3.6 3.6 - 5.5 mmol/L    Chloride 110 96 - 112 mmol/L    Co2 22 20 - 33 mmol/L    Anion Gap 11.0 7.0 - 16.0    Glucose 110 (H) 65 - 99 mg/dL    Bun 17 8 - 22 mg/dL    Creatinine 0.99 0.50 - 1.40 mg/dL    Calcium 8.9 8.5 - 10.5 mg/dL    AST(SGOT) 18 12 - 45 U/L    ALT(SGPT) 19 2 - 50 U/L    Alkaline Phosphatase 47 30 - 99 U/L    Total Bilirubin 0.3 0.1 - 1.5 mg/dL    Albumin 4.0 3.2 - 4.9 g/dL    Total Protein 6.6 6.0 - 8.2 g/dL    Globulin 2.6 1.9 - 3.5 g/dL    A-G Ratio 1.5 g/dL   Troponins NOW   Result Value Ref Range    Troponin T <6 6 - 19 ng/L   Troponins in two (2) hours   Result Value Ref Range    Troponin T <6 6 - 19 ng/L   URINALYSIS (UA)    Specimen: Urine   Result Value Ref Range    Color Yellow     Character Clear     Specific Gravity 1.026 <1.035    Ph 5.5 5.0 - 8.0    Glucose Negative Negative mg/dL    Ketones Trace (A) Negative mg/dL    Protein 30 (A) Negative mg/dL    Bilirubin Negative Negative    Urobilinogen, Urine 0.2 Negative    Nitrite Negative Negative    Leukocyte Esterase Negative Negative    Occult Blood Negative Negative    Micro Urine Req Microscopic    MAGNESIUM   Result Value Ref Range    Magnesium 1.9 1.5 - 2.5 mg/dL   CORRECTED CALCIUM   Result Value Ref Range    Correct Calcium 8.9 8.5 - 10.5 mg/dL   ESTIMATED GFR   Result Value Ref Range    GFR (CKD-EPI) 65 >60 mL/min/1.73 m 2   URINE MICROSCOPIC (W/UA)    Result Value Ref Range    WBC 0-2 /hpf    RBC 0-2 /hpf    Bacteria Negative None /hpf    Epithelial Cells Negative /hpf   EKG   Result Value Ref Range    Report       [facility] Emergency Dept.    Test Date:  2023  Pt Name:    KAPIL PARKER               Department: ER  MRN:        [id number]                      Room:       RD 04  Gender:     Female                       Technician: [id number]  :        [id number]                   Requested By:ER TRIAGE PROTOCOL  Order #:    [id number]                    Reading MD: ARSENIO MONROY MD    Measurements  Intervals                                Axis  Rate:       62                           P:          68  ME:         211                          QRS:        79  QRSD:       104                          T:          47  QT:         460  QTc:        468    Interpretive Statements  Sinus rhythm, rate of 62, normal axis, no st elevation.  Prolonged ME interval  Borderline low voltage, extremity leads  Compared to ECG 2020 18:41:19  First degree AV block now present  Electronically Signed On 2023 20:08:36 PST by ARSENIO MONROY MD       All labs reviewed by me.    EKG:   I have independently interpreted this EKG     Radiology:   The attending Emergency Physician has independently interpreted the diagnostic imaging associated with this visit and is awaiting the final reading from the radiologist, which will be displayed below.    Preliminary interpretation is a follows: Chest x-ray no acute pulmonary process  Radiologist interpretation:     DX-CHEST-PORTABLE (1 VIEW)   Final Result         No acute cardiac or pulmonary abnormality is identified.      EC-ECHOCARDIOGRAM COMPLETE W/O CONT    (Results Pending)   GY-TCAVAXR-6 VIEW    (Results Pending)      COURSE & MEDICAL DECISION MAKING    ED Observation Status? No; Patient does not meet criteria for ED Observation.     INITIAL ASSESSMENT AND PLAN  Care Narrative:      7:57 PM -  Patient seen and evaluated at bedside. Discussed plan of care, including labs and radiology. Patient agrees to plan of care. Patient will be treated with IV Fluids for her symptoms. Ordered DX-Chest, Troponin x2, Magnesium, UA, CBC w/ Diff, CMP, and EKG to evaluate. Differential diagnoses include but are not limited to: Arrhythmia vs. Myocardial Infarction vs. Dehydration vs. Electrolyte Abnormality    10:17 PM - Patient was reevaluated at bedside. Discussed plan of care, including admission to the hospital. Patient agrees to plan of care.    10:22 PM - Paged Cardiology & Hospitalist    10:23 PM - I discussed the patient's case and the above findings with Dr. Lancaster (Cardiology) who agrees with admission and recommends the patient get an echo.    HYDRATION: Based on the patient's presentation of Other orthostatic the patient was given IV fluids. IV Hydration was used because oral hydration was not adequate alone. Upon recheck following hydration, the patient was improved.    11:20 PM patient informed me that she just had bright red blood per rectum while having a bowel movement.  This was in the toilet.  I did observe that it did not look like bright red blood.  I notified the admitting hospitalist.    ADDITIONAL PROBLEM LIST AND DISPOSITION  Problem 1 near syncope patient presents with a near syncopal episode that came on suddenly while she was at [employer].  On initial presentation here her EKG was unremarkable but she did have a short run of nonsustained V. tach.  I am concerned that possible that she could have had a longer run of V. tach causing her near syncope.  I think she will need to be followed on the monitor and may be have an echo to make sure that there is no cardiac abnormalities.    Problem #2 GI bleed.  Right before the patient was taken up to the floor patient for me she had had blood per rectum while trying to have a bowel movement.  This also may been causing her dizziness.  This will need to be  trended and further evaluated as patient is admitted               DISPOSITION AND DISCUSSIONS  I have discussed management of the patient with the following physicians and FERNANDA's: Dr. Lancaster (Cardiology), Dr. Reynolds hospitalist      DISPOSITION:  Patient will be hospitalized by Dr. Dr. Reynolds in guarded condition.    FINAL IMPRESSION   1. Light headed    2. Non-sustained ventricular tachycardia    3. Near syncope    4. Gastrointestinal hemorrhage, unspecified gastrointestinal hemorrhage type       I, Maximiliano Lomas (Scribe), am scribing for, and in the presence of, Efrain Cameron M.D.    Electronically signed by: Maximiliano Lomas (Scribe), 2/1/2023    I, Efrain Cameron M.D. personally performed the services described in this documentation, as scribed by Maximiliano Lomas in my presence, and it is both accurate and complete.    The note accurately reflects work and decisions made by me.  Efrain Cameron M.D.  2/1/2023  11:27 PM

## 2023-02-02 NOTE — ASSESSMENT & PLAN NOTE
Has had multiple episodes of bloody stool, noted since admission.  Significantly improved.  -H&H stable, continue to monitor regularly  -Type and cross match. Transfuse Hgb if <7  -Change IV Protonix to oral  -GI consulted, appreciate recs.  Colonoscopy not recommended at this time.  Recommend outpatient follow-up with gastroenterology and colonoscopy in roughly 1 to 2 months.  S/p EGD (2/2/23): no bleeding source found. GERD grade B. Consider repeat EGD in 2-3 months to assess for Ferguson's esophagus

## 2023-02-02 NOTE — CONSULTS
Date of Consultation:  2023    Patient: : Laverne Riojas  MRN: [id number]    Referring Physician:  Dr. Ildefonso Hicks     GI:Debbie Taylor M.D.     Reason for Consultation: GI bleeding    History of Present Illness:   The patient is a 62 years old female with a medical history of obesity, depression, diverticuli of colon, presented to inpatient GI service for possible GI bleeding.    There is no family history of early GI cancer.  No family history of IBD.  The patient never had upper GI scope before.  The patient had a recent colonoscopy about 1 to 2 years ago at outside facility which only shows diverticula.  This patient reports intermittent use of high-dose NSAID for the back pain.    The GI team is consulted for frequent bowel movement and possible tarry stool and some blood in the last 24 hours.    Hemoglobin stable around 12.  No overt BUN elevation.  The GI team saw the patient at the bedside, and the patient has very mild abdominal discomfort in the middle.  No evidence of acute abdomen.  The patient denied nausea vomiting.  The patient feels urgency of defecation is decreased.              Past Medical History:   Diagnosis Date    Depression     Obesity          Past Surgical History:   Procedure Laterality Date    TONSILLECTOMY         Family History   Problem Relation Age of Onset    Arthritis Mother     Stroke Mother     Sleep Apnea Mother     Sleep Apnea Brother     Cancer Maternal Uncle        Social History     Socioeconomic History    Marital status: Single   Tobacco Use    Smoking status: Former     Packs/day: 0.25     Years: 25.00     Pack years: 6.25     Types: Cigarettes     Quit date: 2002     Years since quittin.5    Smokeless tobacco: Never   Vaping Use    Vaping Use: Never used   Substance and Sexual Activity    Alcohol use: Yes     Comment: occ    Drug use: Yes     Types: Marijuana     Comment: smoking and edibles daily for pain    Sexual activity: Not Currently      "Partners: Male     Birth control/protection: Post-Menopausal     Constitutional: Denies fevers.  Eyes: Denies symptoms.   Ears/Nose/Throat/Mouth: Denies choking.  Cardiovascular: Denies chest pain.   Respiratory: Denies shortness of breath.  Gastrointestinal/Hepatic: Per H/P.     Psychiatric: No complaints      HEENT: grossly normal.  Cardiovascular: Normal heart rate.  Lungs: Respiratory effort is normal.   Abdomen: Soft, Mild tenderness, mid to lower abd.   Neurologic: Alert & oriented x 3, Normal motor function, No focal deficits noted.  PSY: stable mood.         Physical Exam:  Vitals:    02/01/23 2355 02/02/23 0000 02/02/23 0400 02/02/23 0729   BP:  135/73 134/67 138/76   Pulse:  66 (!) 56 64   Resp:  20 18 18   Temp:  36.4 °C (97.5 °F) 36.1 °C (97 °F) 36.3 °C (97.3 °F)   TempSrc:  Temporal Temporal Temporal   SpO2: 96% 96% 95% 93%   Weight: 105 kg (230 lb 13.2 oz)      Height: 1.651 m (5' 5\")                Labs:  Recent Labs     02/01/23 1948 02/02/23 0447   WBC 7.7 9.6   RBC 4.30 4.14*   HEMOGLOBIN 12.7 12.2   HEMATOCRIT 39.6 38.8   MCV 92.1 93.7   MCH 29.5 29.5   MCHC 32.1* 31.4*   RDW 46.2 46.0   PLATELETCT 275 246   MPV 10.9 10.7     Recent Labs     02/01/23 1948 02/02/23 0447   SODIUM 143 138   POTASSIUM 3.6 5.5   CHLORIDE 110 108   CO2 22 23   GLUCOSE 110* 130*   BUN 17 12     Recent Labs     02/02/23 0447   APTT 22.1*   INR 1.02       Recent Labs     02/01/23 1948 02/02/23 0447   ASTSGOT 18 16   ALTSGPT 19 15   TBILIRUBIN 0.3 0.4   ALKPHOSPHAT 47 46   GLOBULIN 2.6 2.4   INR  --  1.02         Imaging:  DX-CHEST-PORTABLE (1 VIEW)  Narrative: 2/1/2023 7:49 PM    HISTORY/REASON FOR EXAM:  Chest Pain    TECHNIQUE/EXAM DESCRIPTION AND NUMBER OF VIEWS:  Single portable view of the chest.    COMPARISON: 01/10/2023    FINDINGS:    Heart size is within normal limits.  No focal infiltrates or consolidations are identified in the lungs.  No pleural fluid collections are identified.  No pneumothorax is " appreciated.  Impression: No acute cardiac or pulmonary abnormality is identified.            Impressions:  62 years old female with not much medical history of GI disease, intermittent use of NSAID, diverticula of colon, presented to GI inpatient service for dark tarry and bloody bowel movement x6 since yesterday.  So far the patient continued to have a stable vitals and a relative stable hemoglobin around 12.    Based on the presentation, bedside evaluation, Pepcid the ulcer related upper GI bleeding is possible and also the lower GI diverticular bleeding or other NSAID related complications are also not excluded.    From the safety standpoint, the GI team can offer upper GI scope today to make sure no rapid upper GI bleeding.  We will suggest the primary team to obtain the outpatient colonoscopy report and upload it to the system.  If her upper GI scope does not find any bleeding source, we will discuss a lower GI study.  Due to the discomfort, we will also suggest a CAT scan to exclude diverticulitis before we consider colonoscopy.    The patient should keep PPI twice daily 40 mg IV dose and keep NPO.  If brisk bleeding happens more, we will suggest CTA.    Diagnosis: upper gastrointestinal bleeding.   Procedure: Esophagogastroduodenoscopy.       This note was generated using voice recognition software which has a small chance of producing errors of grammar and possibly content. I have made every reasonable attempt to find and correct any obvious errors, but expect that some may not be found prior to finalization of this note.

## 2023-02-02 NOTE — ED NOTES
"Pt reports feeling improved- up to restroom with steady gait. States she feels \"weak\" but not lightheaded like she did initially   "

## 2023-02-02 NOTE — PROGRESS NOTES
4 Eyes Skin Assessment Completed by CORINE Lucas and CORINE Lea.    Head WDL  Ears WDL  Nose WDL  Mouth WDL  Neck WDL  Breast/Chest WDL  Shoulder Blades WDL  Spine WDL  (R) Arm/Elbow/Hand WDL  (L) Arm/Elbow/Hand WDL  Abdomen WDL  Groin WDL  Scrotum/Coccyx/Buttocks WDL  (R) Leg WDL  (L) Leg WDL  (R) Heel/Foot/Toe WDL  (L) Heel/Foot/Toe WDL          Devices In Places Tele Box, Blood Pressure Cuff, and Pulse Ox      Interventions In Place Pillows    Possible Skin Injury No    Pictures Uploaded Into Epic N/A  Wound Consult Placed N/A  RN Wound Prevention Protocol Ordered No

## 2023-02-02 NOTE — PROGRESS NOTES
62 year old female with lightheadedness.  5 beats run of Vtach, ? Symptomatic.  Ekg , troponin reviewed. Normal.  Recommend Echo and cardiology will consult in am.  Keep Npo after midnight.

## 2023-02-02 NOTE — PROGRESS NOTES
Telemetry Report:    Rhythm:     SB  Heart Rate:    54-61  Ectopy:          WV:         0.21  QRS:       0.07  QT:   0.44

## 2023-02-02 NOTE — PROGRESS NOTES
Received patient from ED via [facility]. Patient alert and oriented  x 4, on RA. Patient had episodes of bloody BM x 2 and emesis x 1. MD notified. Patient able to ambulate to bathroom with standby assist. Assisted back to bed. All admission assessment completed, 4 eyes completed with CORINE Lea. Will continue to monitor.

## 2023-02-02 NOTE — ED NOTES
"Pt had run of v-tach and returned to sinus rhythm. Pt symptomatic- states her chest feels \"funny\" and endorses lightheadedness  "

## 2023-02-02 NOTE — HOSPITAL COURSE
Patient is a 62 year-old female with a past medical history of depression and obesity who presented on 2/1/23 due to dizziness, palpitations, and abdominal cramping. She notes one day sudden onset of dizziness/SOB, lightheadedness but denies loss of consciousness of head injury. Of note, patient also notes intermittently occurring episodes of palpitations. While in the ED, patient had a bloody bowel movement, first occurrence. Was prescribed ibuprofen 800mg three times daily, but denies use in the last week. Denies anticoagulation or ASA use. Reports recent colonoscopy (within the last few years) with normal findings. She denies fever, chills, weight changes, fatigue, N/V, changes in appetite, changes in stool or family history of colon cancer. Five beats of vtach was noted while in ED, EKG revealed first degree heart block. CXR negative for acute cardiopulmonary processes. She was started on IV PPI twice daily. H&H trended.    Around five episodes of bloody stools noted since admission. GI consulted for ongoing GI bleed. For EGD today (2/2/2023).    Patient had CTA abdomen pelvis on 2//2023 did not show any signs of stenosis or obstruction of celiac artery, superior mesenteric artery or inferior mesenteric artery, per report.  However, did show signs of continued colitis versus diverticulitis.

## 2023-02-02 NOTE — ANESTHESIA PREPROCEDURE EVALUATION
Case: [id number] Date/Time: 23 1334    Procedure: GASTROSCOPY (Esophagus)    Anesthesia type: MAC    Pre-op diagnosis: Upper GI Bleed    Location: [facility] ROOM 26 / SURGERY SAME DAY [facility]    Surgeons: Debbie Taylor M.D.          Relevant Problems   PULMONARY   (positive) Shortness of breath      CARDIAC   (positive) Hemangioma of skin      Other   (positive) GI bleed   (positive) Morbid obesity with BMI of 40.0-44.9, adult (HCC)   (positive) Nonsustained ventricular tachycardia   (positive) Postural dizziness with presyncope     Anes H&P:  PAST MEDICAL HISTORY:   62 y.o. female who presents for Procedure(s) (LRB):  GASTROSCOPY (N/A).  She has current and past medical problems significant for:    Past Medical History:   Diagnosis Date   • Depression    • Obesity        SMOKING/ALCOHOL/RECREATIONAL DRUG USE:  Social History     Tobacco Use   • Smoking status: Former     Packs/day: 0.25     Years: 25.00     Pack years: 6.25     Types: Cigarettes     Quit date: 2002     Years since quittin.5   • Smokeless tobacco: Never   Vaping Use   • Vaping Use: Never used   Substance Use Topics   • Alcohol use: Yes     Comment: occ   • Drug use: Yes     Types: Marijuana     Comment: smoking and edibles daily for pain     Social History     Substance and Sexual Activity   Drug Use Yes   • Types: Marijuana    Comment: smoking and edibles daily for pain       PAST SURGICAL HISTORY:  Past Surgical History:   Procedure Laterality Date   • TONSILLECTOMY         ALLERGIES:   No Known Allergies    MEDICATIONS:  No current facility-administered medications on file prior to encounter.     Current Outpatient Medications on File Prior to Encounter   Medication Sig Dispense Refill   • therapeutic multivitamin-minerals (THERAGRAN-M) Tab Take 1 Tablet by mouth every day.     • Dextromethorphan-guaiFENesin (TUSSIN DM)  MG/5ML Syrup Take 5 mL by mouth every 6 hours as needed (cough). 118 mL 0   • cyclobenzaprine (FLEXERIL) 5 mg  tablet Take 1 Tablet by mouth 3 times a day as needed for Muscle Spasms. 30 Tablet 0   • albuterol 108 (90 Base) MCG/ACT Aero Soln inhalation aerosol Inhale 2 Puffs every four hours as needed for Shortness of Breath. 1 Each 0   • ibuprofen (MOTRIN) 800 MG Tab Take 1 Tablet by mouth every 8 hours as needed for Headache or Mild Pain. 90 Tablet 0   • zinc sulfate (ZINCATE) 220 (50 Zn) MG Cap Take 220 mg by mouth every day.     • vitamin D3 (CHOLECALCIFEROL) 1000 Unit (25 mcg) Tab Take 2,000 Units by mouth every day.     • Omega-3 Fatty Acids (FISH OIL) 1000 MG Cap capsule Take 1,000 mg by mouth 2 times a day.         LABS:  Lab Results   Component Value Date/Time    HEMOGLOBIN 12.2 02/02/2023 0447    HEMATOCRIT 38.8 02/02/2023 0447    WBC 9.6 02/02/2023 0447     Lab Results   Component Value Date/Time    SODIUM 140 02/02/2023 1023    POTASSIUM 4.7 02/02/2023 1023    CHLORIDE 108 02/02/2023 1023    CO2 24 02/02/2023 1023    GLUCOSE 107 (H) 02/02/2023 1023    BUN 8 02/02/2023 1023    CALCIUM 8.9 02/02/2023 1023         PREVIOUS ANESTHETICS: See EMR  __________________________________________      Physical Exam    Airway   Mallampati: II  TM distance: >3 FB  Neck ROM: full       Cardiovascular - normal exam  Rhythm: regular  Rate: normal  (-) murmur     Dental - normal exam           Pulmonary - normal exam  Breath sounds clear to auscultation     Abdominal    Neurological - normal exam                 Anesthesia Plan    ASA 3       Plan - MAC               Induction: intravenous      Pertinent diagnostic labs and testing reviewed    Informed Consent:    Anesthetic plan and risks discussed with patient.

## 2023-02-02 NOTE — ASSESSMENT & PLAN NOTE
Consider secondary to GIB/dehydration or bradycardia  Noted 5 beats of NSVT noted on admission  No recurrence noted on telemetry  -CTM

## 2023-02-02 NOTE — ASSESSMENT & PLAN NOTE
Has had multiple episodes of bloody stool, noted since admission.  Significantly improved.  -H&H stable, continue to monitor regularly  -Type and cross match. Transfuse Hgb if <7  -Continue oral Protonix  -GI consulted, appreciate recs.  Colonoscopy not recommended at this time.  Recommend outpatient follow-up with gastroenterology and colonoscopy in roughly 1 to 2 months.  S/p EGD (2/2/23): no bleeding source found. GERD grade B. Consider repeat EGD in 2-3 months to assess for Ferguson's esophagus

## 2023-02-02 NOTE — PROCEDURES
OPERATIVE REPORT    PATIENT:   Laverne Riojas   1960       PREOPERATIVE DIAGNOSES/INDICATIONS: hematochezia, some dark stool as well in 24 hours.     POSTOPERATIVE DIAGNOSIS: GERD, Gastric erosion, 5cm hiatal hernia.     PROCEDURE:  ESOPHAGOGASTRODUODENOSCOPY    PHYSICIAN:  Debbie Taylor MD. MPH.    ANESTHESIA:  Per anesthesiologist.    LOCATION: [facility]    CONSENT:  OBTAINED. The risks, benefits and alternatives of the procedure were discussed in details. The risks include and are not limited to bleeding, infection, perforation, missed lesions, and sedations risks (cardiopulmonary compromise and allergic reaction to medications).    DESCRIPTION: The patient presented to the procedure room.  After routine checkup was performed, patient was brought into the endoscopy suite.  Patient was placed on his left lateral decubitus position. A bite block was placed in patient's mouth. Patient was sedated by anesthesia.  Vital signs were monitored throughout the procedure.  Oxygenation support was provided throughout the procedure. Upper endoscope was inserted into patient's mouth and advanced to the second portion of the duodenum under direct visualization.      Once the site was reached and examined, the upper endoscope was withdrawn.  Retroflexion was made within the stomach.  The stomach was decompressed, scope was withdrawn and the procedure was terminated.    The patient tolerated the procedure well.  There were no immediate complications.    OPERATIVE FINDINGS:    1. Esophagus: Z line around 35cm. Hiatal hernia 5cm in length. Some irregular mucosa with GERD grade B at least.   2. Stomach: Some erosions around hiatal hernia, no ulcer.   3. Duodenum: Grossly normal.     IMPRESSION/RECOMMENDATIONS:  No bleeding source is found.   She has one small fresh blood BM in the OR.   Still has tenderness over lower abd.   PPI daily dose.  She should consider repeating an EGD in 2-3 months as outpatient to exclude  Ferguson's esophagus.     Please have a CT to exclude diverticulitis.   NPO for now.   Based on CT report, we will decide the need and timing of lower GI endoscopy.     Inpatient GI team will continue to follow up.       This note has been transcribed with digital voice recognition software and although it has been reviewed may contain grammatical or word errors

## 2023-02-02 NOTE — H&P
Hospital Medicine History & Physical Note    Date of Service  2/1/2023    Primary Care Physician  Annmarie De Oliveira M.D.    Consultants      Specialist Names:     Code Status  Full Code    Chief Complaint  Chief Complaint   Patient presents with    Lightheadedness    Abdominal Cramps       History of Presenting Illness  Laverne Riojas is a 62 y.o. female who presented 2/1/2023 with past medical history of obesity and depression who comes to the hospital for dizziness and palpitations.  Today patient felt a sudden episode of dizziness, lightheadedness and shortness of breath.  Patient states that she was having a bowel movement and had mucus stools when this occurred.  She states for the past month these episodes are occurring more often.  She denies ever losing consciousness, chest pain, nausea, vomiting, diarrhea, bloody stools.  Patient states that she was smoking marijuana today.    In the ER patient had 5 beats of nonsustained V. Tach  EKG found normal sinus rhythm, first-degree heart block  Chest x-ray found no acute pulmonary process  Patient had a bloody stool in the ER    I discussed the plan of care with patient.    Review of Systems  Review of Systems   Constitutional:  Positive for malaise/fatigue. Negative for chills, diaphoresis and fever.   HENT:  Negative for congestion, ear discharge, ear pain, hearing loss, nosebleeds, sinus pain, sore throat and tinnitus.    Eyes:  Negative for blurred vision, double vision, photophobia and pain.   Respiratory:  Negative for cough, hemoptysis, sputum production, shortness of breath, wheezing and stridor.    Cardiovascular:  Positive for palpitations. Negative for chest pain, orthopnea, claudication, leg swelling and PND.   Gastrointestinal:  Negative for abdominal pain, blood in stool, constipation, diarrhea, heartburn, melena, nausea and vomiting.   Genitourinary:  Negative for dysuria, flank pain, frequency, hematuria and urgency.   Musculoskeletal:  Negative  for back pain, falls, joint pain, myalgias and neck pain.   Skin:  Negative for itching and rash.   Neurological:  Negative for dizziness, tingling, tremors, weakness and headaches.   Endo/Heme/Allergies:  Negative for environmental allergies and polydipsia. Does not bruise/bleed easily.   Psychiatric/Behavioral:  Negative for depression, hallucinations, substance abuse and suicidal ideas.      Past Medical History   has a past medical history of Depression and Obesity.    Surgical History   has a past surgical history that includes tonsillectomy.     Family History  family history includes Arthritis in her mother; Cancer in her maternal uncle; Sleep Apnea in her brother and mother; Stroke in her mother.   Family history reviewed with patient. There is no family history that is pertinent to the chief complaint.     Social History   reports that she quit smoking about 20 years ago. Her smoking use included cigarettes. She has a 6.25 pack-year smoking history. She has never used smokeless tobacco. She reports current alcohol use. She reports current drug use. Drug: Marijuana.    Allergies  No Known Allergies    Medications  Prior to Admission Medications   Prescriptions Last Dose Informant Patient Reported? Taking?   Dextromethorphan-guaiFENesin (TUSSIN DM)  MG/5ML Syrup   No No   Sig: Take 5 mL by mouth every 6 hours as needed (cough).   Omega-3 Fatty Acids (FISH OIL) 1000 MG Cap capsule   Yes No   Sig: Take 1,000 mg by mouth 2 times a day.   albuterol 108 (90 Base) MCG/ACT Aero Soln inhalation aerosol   No No   Sig: Inhale 2 Puffs every four hours as needed for Shortness of Breath.   cyclobenzaprine (FLEXERIL) 5 mg tablet   No No   Sig: Take 1 Tablet by mouth 3 times a day as needed for Muscle Spasms.   ibuprofen (MOTRIN) 800 MG Tab   No No   Sig: Take 1 Tablet by mouth every 8 hours as needed for Headache or Mild Pain.   therapeutic multivitamin-minerals (THERAGRAN-M) Tab   Yes No   Sig: Take 1 Tablet by  mouth every day.   vitamin D3 (CHOLECALCIFEROL) 1000 Unit (25 mcg) Tab   Yes No   Sig: Take 2,000 Units by mouth every day.   zinc sulfate (ZINCATE) 220 (50 Zn) MG Cap   Yes No   Sig: Take 220 mg by mouth every day.      Facility-Administered Medications: None       Physical Exam  Temp:  [36.1 °C (97 °F)] 36.1 °C (97 °F)  Pulse:  [59-80] 68  Resp:  [20] 20  BP: (141-156)/(74-85) 156/85  SpO2:  [95 %-96 %] 96 %  Blood Pressure: (!) 146/74   Temperature: 36.1 °C (97 °F)   Pulse: 80   Respiration: 20   Pulse Oximetry: 95 %       Physical Exam  Vitals and nursing note reviewed.   Constitutional:       General: She is not in acute distress.     Appearance: Normal appearance. She is obese. She is not ill-appearing, toxic-appearing or diaphoretic.   HENT:      Head: Normocephalic and atraumatic.      Nose: No congestion or rhinorrhea.      Mouth/Throat:      Pharynx: No oropharyngeal exudate or posterior oropharyngeal erythema.   Eyes:      General: No scleral icterus.  Neck:      Vascular: No carotid bruit or JVD.   Cardiovascular:      Rate and Rhythm: Normal rate and regular rhythm.      Pulses: Normal pulses.      Heart sounds: Normal heart sounds. No murmur heard.    No friction rub. No gallop.   Pulmonary:      Effort: Pulmonary effort is normal. No respiratory distress.      Breath sounds: No stridor. No wheezing, rhonchi or rales.   Abdominal:      General: Abdomen is flat. There is no distension.      Palpations: There is no mass.      Tenderness: There is no abdominal tenderness. There is no left CVA tenderness, guarding or rebound.      Hernia: No hernia is present.   Musculoskeletal:         General: No swelling. Normal range of motion.      Cervical back: No rigidity. No muscular tenderness.      Right lower leg: No edema.      Left lower leg: No edema.   Lymphadenopathy:      Cervical: No cervical adenopathy.   Skin:     General: Skin is warm and dry.      Capillary Refill: Capillary refill takes more than 3  seconds.      Coloration: Skin is not jaundiced or pale.      Findings: No bruising or erythema.   Neurological:      Mental Status: She is alert.       Laboratory:  Recent Labs     02/01/23 1948   WBC 7.7   RBC 4.30   HEMOGLOBIN 12.7   HEMATOCRIT 39.6   MCV 92.1   MCH 29.5   MCHC 32.1*   RDW 46.2   PLATELETCT 275   MPV 10.9     Recent Labs     02/01/23 1948   SODIUM 143   POTASSIUM 3.6   CHLORIDE 110   CO2 22   GLUCOSE 110*   BUN 17   CREATININE 0.99   CALCIUM 8.9     Recent Labs     02/01/23 1948   ALTSGPT 19   ASTSGOT 18   ALKPHOSPHAT 47   TBILIRUBIN 0.3   GLUCOSE 110*         No results for input(s): NTPROBNP in the last 72 hours.      Recent Labs     02/01/23 1948 02/01/23 2200   TROPONINT <6 <6       Imaging:  DX-CHEST-PORTABLE (1 VIEW)   Final Result         No acute cardiac or pulmonary abnormality is identified.      EC-ECHOCARDIOGRAM COMPLETE W/O CONT    (Results Pending)   [id number] VIEW    (Results Pending)       X-Ray:  I have personally reviewed the images and compared with prior images.  EKG:  I have personally reviewed the images and compared with prior images.    Assessment/Plan:  Justification for Admission Status  I anticipate this patient is appropriate for observation status at this time because presyncope    Patient will need a Telemetry bed on MEDICAL service .  The need is secondary to presyncope.    * Postural dizziness with presyncope- (present on admission)  Assessment & Plan  5 beats of nonsustained V. Tach  Monitor on telemetry  Obtain a cardiac echo      GI bleed  Assessment & Plan  Continuous cardiac monitoring  Patient has been started on IV fluid hydration with lactated ringer  Patient is started on IV Protonix  Monitor H&H every 8 hours, transfuse for hemoglobin less than 7  We will consult GI in the morning for endoscopic evaluation    Nonsustained ventricular tachycardia  Assessment & Plan  Keep potassium above 4 and magnesium above 2  Cardiac echo is pending  Monitor for  hypoxia        VTE prophylaxis: SCDs/TEDs

## 2023-02-02 NOTE — ED NOTES
Pt back from [facility] she had some bright red blood in her stool. NEDRA Cameron visualized blood and notified Hospitalist Gail.

## 2023-02-02 NOTE — HOSPITAL COURSE
"Patient is a 62 year-old female with a past medical history of depression and obesity who presented on 2/1/23 due to dizziness, palpitations, and abdominal cramping. She notes one day sudden onset of dizziness/SOB, lightheadedness but denies loss of consciousness of head injury.  She reports that she was at [employer] and sat up from a sitting position and suddenly felt lightheaded.  She also reported having abdominal cramps at this time.  She stabilized herself with a chair then went to the restroom.  She reports that she started to \"see stars\" and felt really weak and shaky while on the toilet.  She denies any loss of consciousness.  She reports that she has had syncopal episodes in the past while bearing down during bowel movements.    Of note, patient also notes intermittently occurring episodes of palpitations. While in the ED, patient had a bloody bowel movement, first occurrence. Was prescribed ibuprofen 800mg three times daily, but denies use in the last week. Denies anticoagulation or ASA use. Reports recent colonoscopy (within the last few years) with normal findings. She denies fever, chills, weight changes, fatigue, N/V, changes in appetite, changes in stool or family history of colon cancer. Five beats of vtach was noted while in ED, EKG revealed first degree heart block. CXR negative for acute cardiopulmonary processes. She was started on IV PPI twice daily. H&H trended.    Around five episodes of bloody stools noted since admission. GI consulted for ongoing GI bleed. For EGD today (2/2/2023).  "

## 2023-02-02 NOTE — OR NURSING
1344  RECEIVED PATIENT FROM OR.  REPORT FROM DR. VÁZQUEZ. NO AIRWAY IN PLACE.  RESPIRATIONS ARE EVEN AND UNLABORED.  NO BLEEDING NOTED.    1400  SMALL AMOUNT OF BLOODY DRAINAGE FROM RECTUM.  JEREMIAH PAD APPLIED.    1420  TRANSFERRED TO [place] [id number] BED 1.  REPORT TO [facility] POOL.

## 2023-02-02 NOTE — CARE PLAN
The patient is Stable - Low risk of patient condition declining or worsening    Shift Goals  Clinical Goals: monitor HR,  Patient Goals: sleep  Family Goals: DANICA    Progress made toward(s) clinical / shift goals:    Problem: Knowledge Deficit - Standard  Goal: Patient and family/care givers will demonstrate understanding of plan of care, disease process/condition, diagnostic tests and medications  Outcome: Progressing  Note: Patient updated regarding plan of care and current treatments. All questions answered.       Problem: Gastrointestinal Irritability  Goal: Nausea and vomiting will be absent or improve  Outcome: Progressing  Note: Patient had emesis x 1 when transferred to floor. Complained of Nausea - prn meds given. Nausea resolved per patient after receiving medication.        Patient is not progressing towards the following goals:      Problem: Bowel Elimination  Goal: Establish and maintain regular bowel function  Outcome: Not Progressing  Note: Patient has some episodes of bloody BM x 2.

## 2023-02-02 NOTE — ASSESSMENT & PLAN NOTE
Consider secondary to GIB/volume depletion.  -Telemetry  -Cardiac echo pending  -Monitor electrolytes, keep potassium above 4 and magnesium above 2

## 2023-02-03 ENCOUNTER — APPOINTMENT (OUTPATIENT)
Dept: CARDIOLOGY | Facility: MEDICAL CENTER | Age: 63
DRG: 378 | End: 2023-02-03
Attending: HOSPITALIST
Payer: COMMERCIAL

## 2023-02-03 PROBLEM — K52.9 COLITIS: Status: ACTIVE | Noted: 2023-02-03

## 2023-02-03 LAB
ANION GAP SERPL CALC-SCNC: 9 MMOL/L (ref 7–16)
BASOPHILS # BLD AUTO: 0.3 % (ref 0–1.8)
BASOPHILS # BLD: 0.03 K/UL (ref 0–0.12)
BUN SERPL-MCNC: 7 MG/DL (ref 8–22)
CALCIUM SERPL-MCNC: 8.5 MG/DL (ref 8.5–10.5)
CHLORIDE SERPL-SCNC: 105 MMOL/L (ref 96–112)
CO2 SERPL-SCNC: 25 MMOL/L (ref 20–33)
CREAT SERPL-MCNC: 0.85 MG/DL (ref 0.5–1.4)
EOSINOPHIL # BLD AUTO: 0.07 K/UL (ref 0–0.51)
EOSINOPHIL NFR BLD: 0.7 % (ref 0–6.9)
ERYTHROCYTE [DISTWIDTH] IN BLOOD BY AUTOMATED COUNT: 47.6 FL (ref 35.9–50)
GFR SERPLBLD CREATININE-BSD FMLA CKD-EPI: 78 ML/MIN/1.73 M 2
GLUCOSE SERPL-MCNC: 96 MG/DL (ref 65–99)
HCT VFR BLD AUTO: 37.5 % (ref 37–47)
HGB BLD-MCNC: 12 G/DL (ref 12–16)
HGB BLD-MCNC: 12.2 G/DL (ref 12–16)
IMM GRANULOCYTES # BLD AUTO: 0.05 K/UL (ref 0–0.11)
IMM GRANULOCYTES NFR BLD AUTO: 0.5 % (ref 0–0.9)
LV EJECT FRACT  99904: 65
LV EJECT FRACT MOD 2C 99903: 65.44
LV EJECT FRACT MOD 4C 99902: 63.6
LV EJECT FRACT MOD BP 99901: 62.86
LYMPHOCYTES # BLD AUTO: 1.9 K/UL (ref 1–4.8)
LYMPHOCYTES NFR BLD: 18.8 % (ref 22–41)
MCH RBC QN AUTO: 29.6 PG (ref 27–33)
MCHC RBC AUTO-ENTMCNC: 32 G/DL (ref 33.6–35)
MCV RBC AUTO: 92.4 FL (ref 81.4–97.8)
MONOCYTES # BLD AUTO: 0.61 K/UL (ref 0–0.85)
MONOCYTES NFR BLD AUTO: 6 % (ref 0–13.4)
NEUTROPHILS # BLD AUTO: 7.44 K/UL (ref 2–7.15)
NEUTROPHILS NFR BLD: 73.7 % (ref 44–72)
NRBC # BLD AUTO: 0 K/UL
NRBC BLD-RTO: 0 /100 WBC
PLATELET # BLD AUTO: 227 K/UL (ref 164–446)
PMV BLD AUTO: 11 FL (ref 9–12.9)
POTASSIUM SERPL-SCNC: 4.1 MMOL/L (ref 3.6–5.5)
RBC # BLD AUTO: 4.06 M/UL (ref 4.2–5.4)
SODIUM SERPL-SCNC: 139 MMOL/L (ref 135–145)
WBC # BLD AUTO: 10.1 K/UL (ref 4.8–10.8)

## 2023-02-03 PROCEDURE — 700105 HCHG RX REV CODE 258: Performed by: STUDENT IN AN ORGANIZED HEALTH CARE EDUCATION/TRAINING PROGRAM

## 2023-02-03 PROCEDURE — 96376 TX/PRO/DX INJ SAME DRUG ADON: CPT

## 2023-02-03 PROCEDURE — 700111 HCHG RX REV CODE 636 W/ 250 OVERRIDE (IP): Performed by: STUDENT IN AN ORGANIZED HEALTH CARE EDUCATION/TRAINING PROGRAM

## 2023-02-03 PROCEDURE — 96366 THER/PROPH/DIAG IV INF ADDON: CPT

## 2023-02-03 PROCEDURE — C9113 INJ PANTOPRAZOLE SODIUM, VIA: HCPCS | Performed by: HOSPITALIST

## 2023-02-03 PROCEDURE — 85018 HEMOGLOBIN: CPT

## 2023-02-03 PROCEDURE — 770020 HCHG ROOM/CARE - TELE (206)

## 2023-02-03 PROCEDURE — A9270 NON-COVERED ITEM OR SERVICE: HCPCS | Performed by: HOSPITALIST

## 2023-02-03 PROCEDURE — 700111 HCHG RX REV CODE 636 W/ 250 OVERRIDE (IP): Performed by: HOSPITALIST

## 2023-02-03 PROCEDURE — 700102 HCHG RX REV CODE 250 W/ 637 OVERRIDE(OP): Performed by: HOSPITALIST

## 2023-02-03 PROCEDURE — 99233 SBSQ HOSP IP/OBS HIGH 50: CPT | Performed by: INTERNAL MEDICINE

## 2023-02-03 PROCEDURE — 93306 TTE W/DOPPLER COMPLETE: CPT | Mod: 26 | Performed by: INTERNAL MEDICINE

## 2023-02-03 PROCEDURE — 700101 HCHG RX REV CODE 250: Performed by: HOSPITALIST

## 2023-02-03 PROCEDURE — A9270 NON-COVERED ITEM OR SERVICE: HCPCS | Performed by: STUDENT IN AN ORGANIZED HEALTH CARE EDUCATION/TRAINING PROGRAM

## 2023-02-03 PROCEDURE — 96375 TX/PRO/DX INJ NEW DRUG ADDON: CPT

## 2023-02-03 PROCEDURE — 700101 HCHG RX REV CODE 250: Performed by: STUDENT IN AN ORGANIZED HEALTH CARE EDUCATION/TRAINING PROGRAM

## 2023-02-03 PROCEDURE — 80048 BASIC METABOLIC PNL TOTAL CA: CPT

## 2023-02-03 PROCEDURE — 85025 COMPLETE CBC W/AUTO DIFF WBC: CPT

## 2023-02-03 PROCEDURE — 36415 COLL VENOUS BLD VENIPUNCTURE: CPT

## 2023-02-03 PROCEDURE — 93306 TTE W/DOPPLER COMPLETE: CPT

## 2023-02-03 PROCEDURE — 700102 HCHG RX REV CODE 250 W/ 637 OVERRIDE(OP): Performed by: STUDENT IN AN ORGANIZED HEALTH CARE EDUCATION/TRAINING PROGRAM

## 2023-02-03 PROCEDURE — 99232 SBSQ HOSP IP/OBS MODERATE 35: CPT | Mod: GC | Performed by: HOSPITALIST

## 2023-02-03 RX ORDER — OMEPRAZOLE 20 MG/1
20 CAPSULE, DELAYED RELEASE ORAL 2 TIMES DAILY
Status: DISCONTINUED | OUTPATIENT
Start: 2023-02-03 | End: 2023-02-06 | Stop reason: HOSPADM

## 2023-02-03 RX ORDER — METRONIDAZOLE 500 MG/1
500 TABLET ORAL EVERY 8 HOURS
Status: DISCONTINUED | OUTPATIENT
Start: 2023-02-03 | End: 2023-02-06 | Stop reason: HOSPADM

## 2023-02-03 RX ADMIN — ROSUVASTATIN CALCIUM 20 MG: 5 TABLET, COATED ORAL at 17:02

## 2023-02-03 RX ADMIN — SODIUM CHLORIDE: 9 INJECTION, SOLUTION INTRAVENOUS at 00:01

## 2023-02-03 RX ADMIN — METRONIDAZOLE 500 MG: 5 INJECTION, SOLUTION INTRAVENOUS at 14:36

## 2023-02-03 RX ADMIN — ACETAMINOPHEN 650 MG: 325 TABLET, FILM COATED ORAL at 10:47

## 2023-02-03 RX ADMIN — ACETAMINOPHEN 650 MG: 325 TABLET, FILM COATED ORAL at 01:34

## 2023-02-03 RX ADMIN — CEFTRIAXONE SODIUM 2000 MG: 10 INJECTION, POWDER, FOR SOLUTION INTRAVENOUS at 05:00

## 2023-02-03 RX ADMIN — OMEPRAZOLE 20 MG: 20 CAPSULE, DELAYED RELEASE ORAL at 18:04

## 2023-02-03 RX ADMIN — SODIUM CHLORIDE 1000 ML: 9 INJECTION, SOLUTION INTRAVENOUS at 10:19

## 2023-02-03 RX ADMIN — SENNOSIDES AND DOCUSATE SODIUM 2 TABLET: 50; 8.6 TABLET ORAL at 17:02

## 2023-02-03 RX ADMIN — PANTOPRAZOLE SODIUM 40 MG: 40 INJECTION, POWDER, LYOPHILIZED, FOR SOLUTION INTRAVENOUS at 00:01

## 2023-02-03 RX ADMIN — ACETAMINOPHEN 650 MG: 325 TABLET, FILM COATED ORAL at 20:47

## 2023-02-03 RX ADMIN — METRONIDAZOLE 500 MG: 500 TABLET ORAL at 21:45

## 2023-02-03 RX ADMIN — METRONIDAZOLE 500 MG: 5 INJECTION, SOLUTION INTRAVENOUS at 05:04

## 2023-02-03 ASSESSMENT — ENCOUNTER SYMPTOMS
FALLS: 0
FEVER: 0
BLOOD IN STOOL: 1
NAUSEA: 0
ABDOMINAL PAIN: 1
SHORTNESS OF BREATH: 0
CHILLS: 0
BACK PAIN: 0
DIZZINESS: 0
PALPITATIONS: 0
WHEEZING: 0
VOMITING: 0
MYALGIAS: 0
COUGH: 0
WEIGHT LOSS: 0
HEADACHES: 0

## 2023-02-03 ASSESSMENT — COGNITIVE AND FUNCTIONAL STATUS - GENERAL
SUGGESTED CMS G CODE MODIFIER MOBILITY: CJ
MOBILITY SCORE: 22
CLIMB 3 TO 5 STEPS WITH RAILING: A LITTLE
TURNING FROM BACK TO SIDE WHILE IN FLAT BAD: A LITTLE

## 2023-02-03 ASSESSMENT — PAIN DESCRIPTION - PAIN TYPE
TYPE: ACUTE PAIN

## 2023-02-03 ASSESSMENT — PATIENT HEALTH QUESTIONNAIRE - PHQ9
2. FEELING DOWN, DEPRESSED, IRRITABLE, OR HOPELESS: NOT AT ALL
1. LITTLE INTEREST OR PLEASURE IN DOING THINGS: NOT AT ALL
SUM OF ALL RESPONSES TO PHQ9 QUESTIONS 1 AND 2: 0

## 2023-02-03 NOTE — NON-PROVIDER
Attending: Ildefonso Hicks MD  Senior Resident: Araceli Santacruz MD  Franky Resident: Nima Lucero MD  Medical Student: Sarwat Maguire ([id number])    PATIENT: Laverne Riojas; [id number]; 1960    Interval Update: No acute overnight events. Patient states that she is feeling much better this morning, but continues to have blood streaked stools (approximately 2 overnight according to RN). Patient underwent an EGD 2/2 which did not show any source of upper GI bleed. However, CT Abdomen/Pelvis demonstrated pericolonic fat stranding and colonic wall thickening, indicative of an inflammatory/infectious/ischemic etiology. CT Abdomen/Pelvis also indicates left descending/sigmoid colon diverticula. The patient denies fever, chills, chest pain, diaphoresis, SOB, cough, abdominal pain, abdominal cramping, diarrhea, or constipation. She is in good spirits this morning. She has not experienced any pre-syncopal episodes since admission on 2/1.     OBJECTIVE:  Temp:  [36.1 °C (97 °F)-36.9 °C (98.5 °F)] 36.1 °C (97 °F)  Pulse:  [61-73] 66  Resp:  [16-20] 18  BP: (101-169)/(51-94) 122/68  SpO2:  [91 %-99 %] 97 %    Intake/Output Summary (Last 24 hours) at 2/3/2023 1222  Last data filed at 2/2/2023 1455  Gross per 24 hour   Intake 1300 ml   Output --   Net 1300 ml       PE:  General: Well appearing; NAD; resting on arrival to room  HEENT: NC/AT  Cardiovascular: RRR; no murmurs, gallops, or rubs  Pulmonary: CTAB; symmetrical chest expansion; no rales, rhonchi, or wheezes  Abdominal: Normoactive bowel sounds; non-tender to palpation; no guarding, rigidity, or distension  Extremities: Moves all spontaneously, bilateral calves non-tender to palpation, no pedal edema  Neurological: Alert and oriented    LABS:  Recent Labs     02/01/23  1948 02/02/23  0447 02/02/23  1559 02/03/23  0002   WBC 7.7 9.6  --   --    RBC 4.30 4.14*  --   --    HEMOGLOBIN 12.7 12.2 13.0 12.2   HEMATOCRIT 39.6 38.8  --   --    MCV 92.1 93.7  --   --    MCH  29.5 29.5  --   --    RDW 46.2 46.0  --   --    PLATELETCT 275 246  --   --    MPV 10.9 10.7  --   --    NEUTSPOLYS 48.10 84.10*  --   --    LYMPHOCYTES 44.30* 11.90*  --   --    MONOCYTES 4.60 3.60  --   --    EOSINOPHILS 2.10 0.00  --   --    BASOPHILS 0.50 0.10  --   --      Recent Labs     02/01/23 1948 02/02/23 0447 02/02/23 1023 02/03/23  0002   SODIUM 143 138 140 139   POTASSIUM 3.6 5.5 4.7 4.1   CHLORIDE 110 108 108 105   CO2 22 23 24 25   BUN 17 12 8 7*   CREATININE 0.99 0.78 0.73 0.85   CALCIUM 8.9 8.9 8.9 8.5   MAGNESIUM 1.9  --   --   --    ALBUMIN 4.0 4.0  --   --      Estimated GFR/CRCL = Estimated Creatinine Clearance: 84.7 mL/min (by C-G formula based on SCr of 0.85 mg/dL).  Recent Labs     02/02/23 0447 02/02/23 1023 02/03/23  0002   GLUCOSE 130* 107* 96     Recent Labs     02/01/23 1948 02/02/23 0447   ASTSGOT 18 16   ALTSGPT 19 15   TBILIRUBIN 0.3 0.4   ALKPHOSPHAT 47 46   GLOBULIN 2.6 2.4   INR  --  1.02             Recent Labs     02/02/23 0447   INR 1.02   APTT 22.1*       MICROBIOLOGY:   No results found for: BLOODCULTU, BLDCULT, BCHOLD     IMAGING:   CT-ABDOMEN-PELVIS WITH   Final Result         1.  Segmental area of colonic wall thickening with pericolonic fat involving the descending colon and proximal sigmoid colon is seen, appearance favors subsegmental colitis. Consider inflammatory or ischemic versus ischemic etiology in the appropriate    clinical setting.   2.  Small collection of ascites in the pelvis   3.  Diverticulosis.   4.  Atherosclerosis   5.  Large right hepatic lobe lesion, appearance most typical of large hemangioma.      [id number]-3 VIEW   Final Result      1.  Nonspecific bowel gas pattern.      DX-CHEST-PORTABLE (1 VIEW)   Final Result         No acute cardiac or pulmonary abnormality is identified.      EC-ECHOCARDIOGRAM COMPLETE W/O CONT    (Results Pending)       MEDS:  Current Facility-Administered Medications   Medication Last Admin    omeprazole (PRILOSEC)  capsule 20 mg      rosuvastatin (CRESTOR) tablet 20 mg 20 mg at 02/02/23 1809    NS infusion 1,000 mL at 02/03/23 1019    cefTRIAXone (Rocephin) syringe 2,000 mg 2,000 mg at 02/03/23 0500    metroNIDAZOLE (Flagyl) IVPB 500 mg Stopped at 02/03/23 0604    senna-docusate (PERICOLACE or SENOKOT S) 8.6-50 MG per tablet 2 Tablet 2 Tablet at 02/02/23 1639    And    polyethylene glycol/lytes (MIRALAX) PACKET 1 Packet      And    magnesium hydroxide (MILK OF MAGNESIA) suspension 30 mL      And    bisacodyl (DULCOLAX) suppository 10 mg      acetaminophen (Tylenol) tablet 650 mg 650 mg at 02/03/23 1047    ondansetron (ZOFRAN) syringe/vial injection 4 mg 4 mg at 02/01/23 2337    ondansetron (ZOFRAN ODT) dispertab 4 mg      promethazine (PHENERGAN) tablet 12.5-25 mg 12.5 mg at 02/02/23 0337    promethazine (PHENERGAN) suppository 12.5-25 mg      prochlorperazine (COMPAZINE) injection 5-10 mg 5 mg at 02/02/23 0237    labetalol (NORMODYNE/TRANDATE) injection 10 mg       Facility-Administered Medications Ordered in Other Encounters   Medication Last Admin    propofol (DIPRIVAN) injection Stopped at 02/02/23 1341    ondansetron (ZOFRAN) syringe/vial injection 4 mg at 02/02/23 1336       ASSESSMENT/PLAN: Laverne Riojas is a 62 y.o. female admitted on 2/1/23 for a pre-syncopal episodes associated with severe abdominal pain, cramping, and bloody stools.    GI Bleed  - Patient's ED presentation on 2/1 is most likely to be explained by ischemic/inflammatory/infectious colitis given that her abdominal symptoms were localized to her LLQ. This is supported by CT Abdomen/Pelvis findings which demonstrate inflammatory changes in her descending/sigmoid colon region.   - Ischemic colitis is most likely given patient's clinical presentation, atherosclerosis noted on imaging, and significant improvement in symptoms with NPO diet.   - Continue IV LR infusion   - Change to oral protonix regimen   - Diet has been changed to clear fluid with  step-wise advancement in diet planned if patient does not become symptomatic once again.   - Continue H&H interval monitoring Q 8 hrs     Postural Dizziness with Presyncope   - Patient's clinical presentation of presyncope is most likely due to acute onset of mesenteric ischemia. However, given nonsustained V. Tach in ED, it would be critical to complete cardiac workup to rule a cardiac etiology   - Echocardiogram is currently pending  - Continue to monitor on telemetry     Nonsustained Ventricular Tachycardia   - Patient had 5 beats of nonsustained V. Tach in ED, and she states she has had intermittent palpitations over the last month  - Echocardiogram is currently pending   - Continue to monitor on telemetry   - Keep potassium above 4 and magnesium above 2           #Diet / Fluids  - clear fluid diet     #Bowel Regimen  - Senna/docusate, polyethylene glycol, milk of magnesia, bisacodyl PRN     #DVT Prophylaxis  -Enoxaparin

## 2023-02-03 NOTE — ASSESSMENT & PLAN NOTE
CT abdomen pelvis on 2/2/2023 significant for segmental colonic wall thickening with pericolonic fat involving the descending colon and proximal sigmoid colon, favoring colitis and diverticulosis, per report.  Potential causes include diverticulitis versus infectious versus mesenteric ischemia, given history of hypercholesterolemia    - Slowly advanced diet to clear liquids, then full liquids.  Reevaluate to see if symptoms worsen abdominal pain, which might suggest mesenteric ischemia.  - Continue ceftriaxone and Flagyl, given concern for diverticulitis versus infectious cause of colitis  - Outpatient follow-up with GI and colonoscopy in roughly 1 to 2 months

## 2023-02-03 NOTE — CARE PLAN
The patient is Stable - Low risk of patient condition declining or worsening    Shift Goals  Clinical Goals: monitor bleeding, Hgb>7, K>4, Mg>2,  Patient Goals: Rest  Family Goals: DANICA    Progress made toward(s) clinical / shift goals:    Problem: Pain - Standard  Goal: Alleviation of pain or a reduction in pain to the patient’s comfort goal  Outcome: Progressing  Note: Patient pain controlled with prn meds.     Problem: Gastrointestinal Irritability  Goal: Nausea and vomiting will be absent or improve  Outcome: Progressing  Note: Patient has no episode of nausea and vomiting.        Patient is not progressing towards the following goals:      Problem: Risk for Fluid Volume Deficit Related to Bleeding  Goal: Patient will show no signs and symptoms of excessive bleeding  Outcome: Not Progressing  Flowsheets  Taken 2/3/2023 0146  Last BM: 02/03/23  Taken 2/3/2023 0134  Stool Description:   Small   Brown   Bloody  Note: Patient has multiple episodes of small to moderate loose bloody BM.

## 2023-02-03 NOTE — PROGRESS NOTES
Telemetry Report:    Rhythm:     First Degree AV Block  Heart Rate:   62-72     Ectopy:     None    OR:         0.21  QRS:       0.056  QT:   0.423    Per Telestrip from Monitor Room

## 2023-02-03 NOTE — CARE PLAN
The patient is Stable - Low risk of patient condition declining or worsening    Shift Goals  Clinical Goals: patient's bleeding will be reduced by end of shift  Patient Goals: Rest  Family Goals: Not present, DANICA    Progress made toward(s) clinical / shift goals:  Met  Problem: Pain - Standard  Goal: Alleviation of pain or a reduction in pain to the patient’s comfort goal  Outcome: Met     Problem: Pain - Standard  Goal: Alleviation of pain or a reduction in pain to the patient’s comfort goal  Outcome: Met       Patient is not progressing towards the following goals:

## 2023-02-03 NOTE — PROGRESS NOTES
Patient: : Laverne Riojas  MRN: [id number]    Diagnosis: Hematochezia.    The GI team saw the patient at the bedside today in the morning.  The GI team reviewed the CAT scan finding from last night with the patient.  The patient's CAT scan showed left-sided colitis from the descending colon to the proximal sigmoid.    Based on presentation, CAT scan finding, lab, we suspect more likely the patient had a ischemic bowel injury.    The patient report her hematochezia seems slowed down already and the cramping also getting better today.    The GI team will suggest continue supportive care.  The patient can resume oral intake.  No plan to provide colonoscopy during this admission.  The patient should have outpatient GI follow-up in 2 to 3 months for possible repeat colonoscopy.    Otherwise no other intervention for the GI team.  The patient can keep a daily overall PPI dose for the upper GI.    GI team signed off on February 3.    Labs:  Recent Labs     02/01/23 1948 02/02/23 0447 02/02/23  1559 02/03/23  0002   WBC 7.7 9.6  --   --    RBC 4.30 4.14*  --   --    HEMOGLOBIN 12.7 12.2 13.0 12.2   HEMATOCRIT 39.6 38.8  --   --    MCV 92.1 93.7  --   --    MCH 29.5 29.5  --   --    MCHC 32.1* 31.4*  --   --    RDW 46.2 46.0  --   --    PLATELETCT 275 246  --   --    MPV 10.9 10.7  --   --      Recent Labs     02/02/23 0447 02/02/23  1023 02/03/23  0002   SODIUM 138 140 139   POTASSIUM 5.5 4.7 4.1   CHLORIDE 108 108 105   CO2 23 24 25   GLUCOSE 130* 107* 96   BUN 12 8 7*     Recent Labs     02/02/23 0447   APTT 22.1*   INR 1.02       Recent Labs     02/01/23 1948 02/02/23 0447   ASTSGOT 18 16   ALTSGPT 19 15   TBILIRUBIN 0.3 0.4   ALKPHOSPHAT 47 46   GLOBULIN 2.6 2.4   INR  --  1.02

## 2023-02-03 NOTE — PROGRESS NOTES
"[facility] Internal Medicine Daily Progress Note    Date of Service  2/3/2023    [facility] Team: R IM Green Team   Attending: KELVIN Hicks M.d.  Senior Resident: Dr. Santacruz  Intern:  Dr. Lucero  Contact Number: [phone]    Chief Complaint  Chief Complaint   Patient presents with    Lightheadedness    Abdominal Cramps       Hospital Course  Patient is a 62 year-old female with a past medical history of depression and obesity who presented on 2/1/23 due to dizziness, palpitations, and abdominal cramping. She notes one day sudden onset of dizziness/SOB, lightheadedness but denies loss of consciousness of head injury.  She reports that she was at [employer] and sat up from a sitting position and suddenly felt lightheaded.  She also reported having abdominal cramps at this time.  She stabilized herself with a chair then went to the restroom.  She reports that she started to \"see stars\" and felt really weak and shaky while on the toilet.  She denies any loss of consciousness.  She reports that she has had syncopal episodes in the past while bearing down during bowel movements.    Of note, patient also notes intermittently occurring episodes of palpitations. While in the ED, patient had a bloody bowel movement, first occurrence. Was prescribed ibuprofen 800mg three times daily, but denies use in the last week. Denies anticoagulation or ASA use. Reports recent colonoscopy (within the last few years) with normal findings. She denies fever, chills, weight changes, fatigue, N/V, changes in appetite, changes in stool or family history of colon cancer. Five beats of vtach was noted while in ED, EKG revealed first degree heart block. CXR negative for acute cardiopulmonary processes. She was started on IV PPI twice daily. H&H trended.    Around five episodes of bloody stools noted since admission. GI consulted for ongoing GI bleed. For EGD today (2/2/2023).    Interval Problem Update  CT abdomen pelvis on 2/2/2023 significant " for segmental colonic wall thickening with pericolonic fat involving the descending colon and proximal sigmoid colon, favoring colitis, per report.  Imaging also showed diverticulosis and a large right hepatic lobe lesion typical of a hemangioma, per report.    Today, the patient reports that her abdominal pain is significantly improved and essentially resolved.  But she has had intermittent left lower quadrant cramping abdominal pain.  She did have 2 small bowel movements with streaks of bright red blood in the stool.  Case was discussed with gastroenterology, who did not recommend a colonoscopy at this time.    I have discussed this patient's plan of care and discharge plan at IDT rounds today with Case Management, Nursing, Nursing leadership, and other members of the IDT team.    Consultants/Specialty  GI    Code Status  Full Code    Disposition  Patient is not medically cleared for discharge.   Anticipate discharge to to home with close outpatient follow-up.  I have placed the appropriate orders for post-discharge needs.    Review of Systems  Review of Systems   Constitutional:  Negative for chills, fever, malaise/fatigue and weight loss.   Respiratory:  Negative for cough, shortness of breath and wheezing.    Cardiovascular:  Negative for chest pain, palpitations and leg swelling.   Gastrointestinal:  Positive for abdominal pain (Intermittent left lower quadrant Cramping pain) and blood in stool. Negative for nausea and vomiting.   Genitourinary:  Negative for hematuria.   Musculoskeletal:  Negative for back pain, falls and myalgias.   Neurological:  Negative for dizziness and headaches.      Physical Exam  Temp:  [36.1 °C (97 °F)-36.9 °C (98.5 °F)] 36.8 °C (98.3 °F)  Pulse:  [61-73] 66  Resp:  [16-20] 18  BP: (101-169)/(51-94) 136/78  SpO2:  [91 %-99 %] 93 %    Physical Exam  Constitutional:       General: She is not in acute distress.     Appearance: She is obese. She is not ill-appearing or toxic-appearing.    HENT:      Head: Normocephalic and atraumatic.      Right Ear: External ear normal.      Left Ear: External ear normal.      Nose: Nose normal. No rhinorrhea.      Mouth/Throat:      Mouth: Mucous membranes are moist.   Eyes:      General: No scleral icterus.        Right eye: No discharge.         Left eye: No discharge.      Extraocular Movements: Extraocular movements intact.      Conjunctiva/sclera: Conjunctivae normal.   Cardiovascular:      Rate and Rhythm: Normal rate and regular rhythm.      Pulses: Normal pulses.      Heart sounds: No murmur heard.  Pulmonary:      Effort: Pulmonary effort is normal. No respiratory distress.      Breath sounds: No wheezing.   Abdominal:      General: Bowel sounds are normal.      Palpations: Abdomen is soft.      Tenderness: There is no abdominal tenderness. There is no guarding or rebound.      Hernia: No hernia is present.   Musculoskeletal:         General: No swelling. Normal range of motion.      Cervical back: Normal range of motion.   Skin:     General: Skin is warm and dry.   Neurological:      General: No focal deficit present.      Mental Status: She is alert and oriented to person, place, and time.   Psychiatric:         Mood and Affect: Mood normal.         Behavior: Behavior normal.         Thought Content: Thought content normal.         Judgment: Judgment normal.       Fluids    Intake/Output Summary (Last 24 hours) at 2/3/2023 1249  Last data filed at 2/2/2023 1455  Gross per 24 hour   Intake 1300 ml   Output --   Net 1300 ml       Laboratory  Recent Labs     02/01/23  1948 02/02/23 0447 02/02/23  1559 02/03/23  0002   WBC 7.7 9.6  --   --    RBC 4.30 4.14*  --   --    HEMOGLOBIN 12.7 12.2 13.0 12.2   HEMATOCRIT 39.6 38.8  --   --    MCV 92.1 93.7  --   --    MCH 29.5 29.5  --   --    MCHC 32.1* 31.4*  --   --    RDW 46.2 46.0  --   --    PLATELETCT 275 246  --   --    MPV 10.9 10.7  --   --      Recent Labs     02/02/23 0447 02/02/23  1023  02/03/23  0002   SODIUM 138 140 139   POTASSIUM 5.5 4.7 4.1   CHLORIDE 108 108 105   CO2 23 24 25   GLUCOSE 130* 107* 96   BUN 12 8 7*   CREATININE 0.78 0.73 0.85   CALCIUM 8.9 8.9 8.5     Recent Labs     02/02/23  0447   APTT 22.1*   INR 1.02         Recent Labs     02/02/23  0447   TRIGLYCERIDE 64   HDL 56   *       Imaging  CT-ABDOMEN-PELVIS WITH   Final Result         1.  Segmental area of colonic wall thickening with pericolonic fat involving the descending colon and proximal sigmoid colon is seen, appearance favors subsegmental colitis. Consider inflammatory or ischemic versus ischemic etiology in the appropriate    clinical setting.   2.  Small collection of ascites in the pelvis   3.  Diverticulosis.   4.  Atherosclerosis   5.  Large right hepatic lobe lesion, appearance most typical of large hemangioma.      [id number] VIEW   Final Result      1.  Nonspecific bowel gas pattern.      DX-CHEST-PORTABLE (1 VIEW)   Final Result         No acute cardiac or pulmonary abnormality is identified.      EC-ECHOCARDIOGRAM COMPLETE W/O CONT    (Results Pending)        Assessment/Plan  Problem Representation:    Colitis  Assessment & Plan  CT abdomen pelvis on 2/2/2023 significant for segmental colonic wall thickening with pericolonic fat involving the descending colon and proximal sigmoid colon, favoring colitis and diverticulosis, per report.  Potential causes include diverticulitis versus infectious versus mesenteric ischemia, given history of hypercholesterolemia    - Slowly advanced diet to clear liquids, then full liquids.  Reevaluate to see if symptoms worsen abdominal pain, which might suggest mesenteric ischemia.  - Continue ceftriaxone and Flagyl, given concern for diverticulitis versus infectious cause of colitis  - Outpatient follow-up with GI and colonoscopy in roughly 1 to 2 months      GI bleed  Assessment & Plan  Has had multiple episodes of bloody stool, noted since admission.  Significantly  improved.  -H&H stable, continue to monitor regularly  -Type and cross match. Transfuse Hgb if <7  -Change IV Protonix to oral  -GI consulted, appreciate recs.  Colonoscopy not recommended at this time.  Recommend outpatient follow-up with gastroenterology and colonoscopy in roughly 1 to 2 months.  S/p EGD (2/2/23): no bleeding source found. GERD grade B. Consider repeat EGD in 2-3 months to assess for Ferguson's esophagus    Postural dizziness with presyncope- (present on admission)  Assessment & Plan  -Consider secondary to GIB/dehydration versus bradycardia versus vasovagal syncope secondary to bearing down during bowel movement  - Orthostatic blood pressure negative  - Encourage oral hydration    Hypercholesteremia  Assessment & Plan  - Continue rosuvastatin    Nonsustained ventricular tachycardia  Assessment & Plan  Consider secondary to GIB/volume depletion.  -Telemetry  -Cardiac echo pending  -Monitor electrolytes, keep potassium above 4 and magnesium above 2         VTE prophylaxis: SCDs/TEDs due to concerns for GIB    I have performed a physical exam and reviewed and updated ROS and Plan today (2/3/2023). In review of yesterday's note (2/2/2023), there are no changes except as documented above.

## 2023-02-03 NOTE — CARE PLAN
The patient is Stable - Low risk of patient condition declining or worsening    Shift Goals  Clinical Goals: Monitor patient's cardiac and GI condition by end of shift  Patient Goals: Rest  Family Goals: DANICA  Problem: Gastrointestinal Irritability  Goal: Nausea and vomiting will be absent or improve  Outcome: Met  Goal: Diarrhea will be absent or improved  Outcome: Met     Problem: Gastrointestinal Irritability  Goal: Nausea and vomiting will be absent or improve  Outcome: Met     Problem: Gastrointestinal Irritability  Goal: Diarrhea will be absent or improved  Outcome: Met       Progress made toward(s) clinical / shift goals:    Problem: Pain - Standard  Goal: Alleviation of pain or a reduction in pain to the patient’s comfort goal  Outcome: Met     Problem: Pain - Standard  Goal: Alleviation of pain or a reduction in pain to the patient’s comfort goal  Outcome: Met     Problem: Bowel Elimination  Goal: Establish and maintain regular bowel function  Outcome: Met     Problem: Bowel Elimination  Goal: Establish and maintain regular bowel function  Outcome: Met       Patient is not progressing towards the following goals:

## 2023-02-04 ENCOUNTER — APPOINTMENT (OUTPATIENT)
Dept: RADIOLOGY | Facility: MEDICAL CENTER | Age: 63
DRG: 378 | End: 2023-02-04
Payer: COMMERCIAL

## 2023-02-04 PROBLEM — R10.9 ABDOMINAL PAIN: Status: ACTIVE | Noted: 2023-02-04

## 2023-02-04 LAB
ALBUMIN SERPL BCP-MCNC: 3.2 G/DL (ref 3.2–4.9)
BASOPHILS # BLD AUTO: 0.4 % (ref 0–1.8)
BASOPHILS # BLD: 0.04 K/UL (ref 0–0.12)
BUN SERPL-MCNC: 6 MG/DL (ref 8–22)
C DIFF DNA SPEC QL NAA+PROBE: NEGATIVE
C DIFF TOX GENS STL QL NAA+PROBE: NEGATIVE
CALCIUM ALBUM COR SERPL-MCNC: 9 MG/DL (ref 8.5–10.5)
CALCIUM SERPL-MCNC: 8.4 MG/DL (ref 8.5–10.5)
CHLORIDE SERPL-SCNC: 108 MMOL/L (ref 96–112)
CO2 SERPL-SCNC: 23 MMOL/L (ref 20–33)
CREAT SERPL-MCNC: 0.73 MG/DL (ref 0.5–1.4)
EOSINOPHIL # BLD AUTO: 0.2 K/UL (ref 0–0.51)
EOSINOPHIL NFR BLD: 2.2 % (ref 0–6.9)
ERYTHROCYTE [DISTWIDTH] IN BLOOD BY AUTOMATED COUNT: 45.5 FL (ref 35.9–50)
GFR SERPLBLD CREATININE-BSD FMLA CKD-EPI: 94 ML/MIN/1.73 M 2
GLUCOSE SERPL-MCNC: 89 MG/DL (ref 65–99)
HCT VFR BLD AUTO: 36.4 % (ref 37–47)
HGB BLD-MCNC: 11.9 G/DL (ref 12–16)
IMM GRANULOCYTES # BLD AUTO: 0.02 K/UL (ref 0–0.11)
IMM GRANULOCYTES NFR BLD AUTO: 0.2 % (ref 0–0.9)
LYMPHOCYTES # BLD AUTO: 2.57 K/UL (ref 1–4.8)
LYMPHOCYTES NFR BLD: 28.5 % (ref 22–41)
MAGNESIUM SERPL-MCNC: 1.7 MG/DL (ref 1.5–2.5)
MCH RBC QN AUTO: 29.8 PG (ref 27–33)
MCHC RBC AUTO-ENTMCNC: 32.7 G/DL (ref 33.6–35)
MCV RBC AUTO: 91.2 FL (ref 81.4–97.8)
MONOCYTES # BLD AUTO: 0.51 K/UL (ref 0–0.85)
MONOCYTES NFR BLD AUTO: 5.7 % (ref 0–13.4)
NEUTROPHILS # BLD AUTO: 5.67 K/UL (ref 2–7.15)
NEUTROPHILS NFR BLD: 63 % (ref 44–72)
NRBC # BLD AUTO: 0 K/UL
NRBC BLD-RTO: 0 /100 WBC
PHOSPHATE SERPL-MCNC: 3.5 MG/DL (ref 2.5–4.5)
PLATELET # BLD AUTO: 191 K/UL (ref 164–446)
PMV BLD AUTO: 10.7 FL (ref 9–12.9)
POTASSIUM SERPL-SCNC: 3.8 MMOL/L (ref 3.6–5.5)
RBC # BLD AUTO: 3.99 M/UL (ref 4.2–5.4)
SODIUM SERPL-SCNC: 139 MMOL/L (ref 135–145)
WBC # BLD AUTO: 9 K/UL (ref 4.8–10.8)

## 2023-02-04 PROCEDURE — 99232 SBSQ HOSP IP/OBS MODERATE 35: CPT | Mod: GC | Performed by: HOSPITALIST

## 2023-02-04 PROCEDURE — 700111 HCHG RX REV CODE 636 W/ 250 OVERRIDE (IP)

## 2023-02-04 PROCEDURE — 85025 COMPLETE CBC W/AUTO DIFF WBC: CPT

## 2023-02-04 PROCEDURE — 770020 HCHG ROOM/CARE - TELE (206)

## 2023-02-04 PROCEDURE — 700102 HCHG RX REV CODE 250 W/ 637 OVERRIDE(OP): Performed by: STUDENT IN AN ORGANIZED HEALTH CARE EDUCATION/TRAINING PROGRAM

## 2023-02-04 PROCEDURE — 700102 HCHG RX REV CODE 250 W/ 637 OVERRIDE(OP): Performed by: HOSPITALIST

## 2023-02-04 PROCEDURE — 80069 RENAL FUNCTION PANEL: CPT

## 2023-02-04 PROCEDURE — 700102 HCHG RX REV CODE 250 W/ 637 OVERRIDE(OP)

## 2023-02-04 PROCEDURE — 700111 HCHG RX REV CODE 636 W/ 250 OVERRIDE (IP): Performed by: STUDENT IN AN ORGANIZED HEALTH CARE EDUCATION/TRAINING PROGRAM

## 2023-02-04 PROCEDURE — 83735 ASSAY OF MAGNESIUM: CPT

## 2023-02-04 PROCEDURE — 36415 COLL VENOUS BLD VENIPUNCTURE: CPT

## 2023-02-04 PROCEDURE — A9270 NON-COVERED ITEM OR SERVICE: HCPCS | Performed by: HOSPITALIST

## 2023-02-04 PROCEDURE — A9270 NON-COVERED ITEM OR SERVICE: HCPCS | Performed by: STUDENT IN AN ORGANIZED HEALTH CARE EDUCATION/TRAINING PROGRAM

## 2023-02-04 PROCEDURE — 74174 CTA ABD&PLVS W/CONTRAST: CPT

## 2023-02-04 PROCEDURE — 700117 HCHG RX CONTRAST REV CODE 255

## 2023-02-04 PROCEDURE — A9270 NON-COVERED ITEM OR SERVICE: HCPCS

## 2023-02-04 PROCEDURE — 87493 C DIFF AMPLIFIED PROBE: CPT

## 2023-02-04 RX ORDER — POTASSIUM CHLORIDE 20 MEQ/1
20 TABLET, EXTENDED RELEASE ORAL ONCE
Status: COMPLETED | OUTPATIENT
Start: 2023-02-04 | End: 2023-02-04

## 2023-02-04 RX ORDER — MAGNESIUM SULFATE HEPTAHYDRATE 40 MG/ML
4 INJECTION, SOLUTION INTRAVENOUS ONCE
Status: COMPLETED | OUTPATIENT
Start: 2023-02-04 | End: 2023-02-04

## 2023-02-04 RX ADMIN — MAGNESIUM SULFATE HEPTAHYDRATE 4 G: 40 INJECTION, SOLUTION INTRAVENOUS at 09:42

## 2023-02-04 RX ADMIN — METRONIDAZOLE 500 MG: 500 TABLET ORAL at 04:04

## 2023-02-04 RX ADMIN — METRONIDAZOLE 500 MG: 500 TABLET ORAL at 21:37

## 2023-02-04 RX ADMIN — OMEPRAZOLE 20 MG: 20 CAPSULE, DELAYED RELEASE ORAL at 04:04

## 2023-02-04 RX ADMIN — POTASSIUM CHLORIDE 20 MEQ: 1500 TABLET, EXTENDED RELEASE ORAL at 09:35

## 2023-02-04 RX ADMIN — IOHEXOL 85 ML: 350 INJECTION, SOLUTION INTRAVENOUS at 15:55

## 2023-02-04 RX ADMIN — ROSUVASTATIN CALCIUM 20 MG: 5 TABLET, COATED ORAL at 21:37

## 2023-02-04 RX ADMIN — CEFTRIAXONE SODIUM 2000 MG: 10 INJECTION, POWDER, FOR SOLUTION INTRAVENOUS at 04:05

## 2023-02-04 RX ADMIN — OMEPRAZOLE 20 MG: 20 CAPSULE, DELAYED RELEASE ORAL at 18:37

## 2023-02-04 RX ADMIN — METRONIDAZOLE 500 MG: 500 TABLET ORAL at 13:32

## 2023-02-04 ASSESSMENT — ENCOUNTER SYMPTOMS
DIZZINESS: 0
SHORTNESS OF BREATH: 0
BACK PAIN: 0
FALLS: 0
PALPITATIONS: 0
VOMITING: 0
NAUSEA: 0
BLOOD IN STOOL: 1
COUGH: 0
ABDOMINAL PAIN: 1
CHILLS: 0
WHEEZING: 0
WEIGHT LOSS: 0
HEADACHES: 0
MYALGIAS: 0
FEVER: 0

## 2023-02-04 ASSESSMENT — COGNITIVE AND FUNCTIONAL STATUS - GENERAL
CLIMB 3 TO 5 STEPS WITH RAILING: A LITTLE
SUGGESTED CMS G CODE MODIFIER MOBILITY: CJ
DAILY ACTIVITIY SCORE: 24
MOBILITY SCORE: 22
TURNING FROM BACK TO SIDE WHILE IN FLAT BAD: A LITTLE
SUGGESTED CMS G CODE MODIFIER DAILY ACTIVITY: CH

## 2023-02-04 ASSESSMENT — PAIN DESCRIPTION - PAIN TYPE
TYPE: ACUTE PAIN
TYPE: ACUTE PAIN

## 2023-02-04 ASSESSMENT — PATIENT HEALTH QUESTIONNAIRE - PHQ9
1. LITTLE INTEREST OR PLEASURE IN DOING THINGS: NOT AT ALL
2. FEELING DOWN, DEPRESSED, IRRITABLE, OR HOPELESS: NOT AT ALL
SUM OF ALL RESPONSES TO PHQ9 QUESTIONS 1 AND 2: 0

## 2023-02-04 NOTE — ANESTHESIA POSTPROCEDURE EVALUATION
Patient: Laverne Riojas    Procedure Summary     Date: 02/02/23 Room / Location: [facility] ROOM 26 / SURGERY SAME DAY [facility]    Anesthesia Start: 1330 Anesthesia Stop: 1350    Procedure: GASTROSCOPY (Esophagus) Diagnosis: (Hiatal Hernia, Gastric Erosion, GERD)    Surgeons: Debbie Taylor M.D. Responsible Provider: Marco Mendoza M.D.    Anesthesia Type: MAC ASA Status: 3          Final Anesthesia Type: MAC  Last vitals  BP   Blood Pressure: 130/77    Temp   36.3 °C (97.3 °F)    Pulse   78   Resp   15    SpO2   94 %      Anesthesia Post Evaluation    Patient location during evaluation: PACU  Patient participation: complete - patient participated  Level of consciousness: sleepy but conscious    Airway patency: patent  Anesthetic complications: no  Cardiovascular status: hemodynamically stable  Respiratory status: acceptable  Hydration status: balanced    PONV: none          No notable events documented.     Nurse Pain Score: 0 (NPRS)

## 2023-02-04 NOTE — PROGRESS NOTES
[facility] Internal Medicine Daily Progress Note    Date of Service  2/4/2023    [facility] Team: R IM Green Team   Attending: KELVIN Hicks M.d.  Senior Resident: Dr. Santacruz  Intern:  Dr. Lucero  Contact Number: [phone]    Chief Complaint  Chief Complaint   Patient presents with    Lightheadedness    Abdominal Cramps       Hospital Course  Patient is a 62 year-old female with a past medical history of depression and obesity who presented on 2/1/23 due to dizziness, palpitations, and abdominal cramping. She notes one day sudden onset of dizziness/SOB, lightheadedness but denies loss of consciousness of head injury. Of note, patient also notes intermittently occurring episodes of palpitations. While in the ED, patient had a bloody bowel movement, first occurrence. Was prescribed ibuprofen 800mg three times daily, but denies use in the last week. Denies anticoagulation or ASA use. Reports recent colonoscopy (within the last few years) with normal findings. She denies fever, chills, weight changes, fatigue, N/V, changes in appetite, changes in stool or family history of colon cancer. Five beats of vtach was noted while in ED, EKG revealed first degree heart block. CXR negative for acute cardiopulmonary processes. She was started on IV PPI twice daily. H&H trended.    Around five episodes of bloody stools noted since admission. GI consulted for ongoing GI bleed. For EGD today (2/2/2023).    Interval Problem Update  The patient's diet was advanced yesterday to clear liquids.  She reports having sharp/cramping abdominal pain shortly after having her first meal, consisting of chicken broth and Jell-O.  For her next meal, she finished her meal over the course of 2 hours and reports that her pain was much improved.  This morning she reports having continued left lower quadrant abdominal pain and cramping.  She reports having multiple bouts of diarrhea (roughly 5-6) with a dark yellow/mustard appearance.  Denies any  blood in stool.  Stool sample for C. difficile was collected and negative.    Echocardiogram on 2/3/2023 was unremarkable with no reduction in ejection fraction or wall motion hypokinesis.    I have discussed this patient's plan of care and discharge plan at IDT rounds today with Case Management, Nursing, Nursing leadership, and other members of the IDT team.    Consultants/Specialty  GI    Code Status  Full Code    Disposition  Patient is not medically cleared for discharge.   Anticipate discharge to to home with close outpatient follow-up.  I have placed the appropriate orders for post-discharge needs.    Review of Systems  Review of Systems   Constitutional:  Negative for chills, fever, malaise/fatigue and weight loss.   Respiratory:  Negative for cough, shortness of breath and wheezing.    Cardiovascular:  Negative for chest pain, palpitations and leg swelling.   Gastrointestinal:  Positive for abdominal pain (Intermittent left lower quadrant Cramping pain) and blood in stool. Negative for nausea and vomiting.   Genitourinary:  Negative for hematuria.   Musculoskeletal:  Negative for back pain, falls and myalgias.   Neurological:  Negative for dizziness and headaches.      Physical Exam  Temp:  [35.9 °C (96.7 °F)-36.6 °C (97.8 °F)] 36.3 °C (97.4 °F)  Pulse:  [64-78] 74  Resp:  [15-18] 15  BP: (125-149)/(74-84) 149/84  SpO2:  [92 %-96 %] 94 %    Physical Exam  Constitutional:       General: She is not in acute distress.     Appearance: She is obese. She is not ill-appearing or toxic-appearing.   HENT:      Head: Normocephalic and atraumatic.      Right Ear: External ear normal.      Left Ear: External ear normal.      Nose: Nose normal. No rhinorrhea.      Mouth/Throat:      Mouth: Mucous membranes are moist.   Eyes:      General: No scleral icterus.        Right eye: No discharge.         Left eye: No discharge.      Extraocular Movements: Extraocular movements intact.      Conjunctiva/sclera: Conjunctivae  normal.   Cardiovascular:      Rate and Rhythm: Normal rate and regular rhythm.      Pulses: Normal pulses.      Heart sounds: No murmur heard.  Pulmonary:      Effort: Pulmonary effort is normal. No respiratory distress.      Breath sounds: No wheezing.   Abdominal:      General: Bowel sounds are normal.      Palpations: Abdomen is soft.      Tenderness: There is no abdominal tenderness. There is no guarding or rebound.      Hernia: No hernia is present.   Musculoskeletal:         General: No swelling. Normal range of motion.      Cervical back: Normal range of motion.   Skin:     General: Skin is warm and dry.   Neurological:      General: No focal deficit present.      Mental Status: She is alert and oriented to person, place, and time.   Psychiatric:         Mood and Affect: Mood normal.         Behavior: Behavior normal.         Thought Content: Thought content normal.         Judgment: Judgment normal.       Fluids    Intake/Output Summary (Last 24 hours) at 2/4/2023 1301  Last data filed at 2/4/2023 0959  Gross per 24 hour   Intake 120 ml   Output --   Net 120 ml       Laboratory  Recent Labs     02/02/23  0447 02/02/23  1559 02/03/23  0002 02/03/23  1257 02/04/23  0244   WBC 9.6  --   --  10.1 9.0   RBC 4.14*  --   --  4.06* 3.99*   HEMOGLOBIN 12.2   < > 12.2 12.0 11.9*   HEMATOCRIT 38.8  --   --  37.5 36.4*   MCV 93.7  --   --  92.4 91.2   MCH 29.5  --   --  29.6 29.8   MCHC 31.4*  --   --  32.0* 32.7*   RDW 46.0  --   --  47.6 45.5   PLATELETCT 246  --   --  227 191   MPV 10.7  --   --  11.0 10.7    < > = values in this interval not displayed.     Recent Labs     02/02/23  1023 02/03/23  0002 02/04/23  0244   SODIUM 140 139 139   POTASSIUM 4.7 4.1 3.8   CHLORIDE 108 105 108   CO2 24 25 23   GLUCOSE 107* 96 89   BUN 8 7* 6*   CREATININE 0.73 0.85 0.73   CALCIUM 8.9 8.5 8.4*     Recent Labs     02/02/23 0447   APTT 22.1*   INR 1.02         Recent Labs     02/02/23 0447   TRIGLYCERIDE 64   HDL 56   *        Imaging  EC-ECHOCARDIOGRAM COMPLETE W/O CONT   Final Result      CT-ABDOMEN-PELVIS WITH   Final Result         1.  Segmental area of colonic wall thickening with pericolonic fat involving the descending colon and proximal sigmoid colon is seen, appearance favors subsegmental colitis. Consider inflammatory or ischemic versus ischemic etiology in the appropriate    clinical setting.   2.  Small collection of ascites in the pelvis   3.  Diverticulosis.   4.  Atherosclerosis   5.  Large right hepatic lobe lesion, appearance most typical of large hemangioma.      [id number] VIEW   Final Result      1.  Nonspecific bowel gas pattern.      DX-CHEST-PORTABLE (1 VIEW)   Final Result         No acute cardiac or pulmonary abnormality is identified.      CTA ABDOMEN PELVIS W & W/O POST PROCESS    (Results Pending)        Assessment/Plan  Problem Representation:    Abdominal pain  Assessment & Plan  CT abdomen pelvis on 2/2/2023 significant for segmental colonic wall thickening with pericolonic fat involving the descending colon and proximal sigmoid colon, favoring colitis and diverticulosis, per report.  Potential causes include diverticulitis versus infectious versus inferior mesenteric artery ischemia, given history of hypercholesterolemia    -CTA abdomen pelvis to further evaluate for inferior mesenteric artery ischemia.  It is also possible the patient has diverticulitis, given diverticulosis seen on imaging, has not fully resolved and caused increased aggravation with advancing diet.    -If CT angio shows signs of ischemia, consider surgery consultation for possible hemicolectomy.  -Allow bowel rest and slowly advanced diet as tolerated  - Continue ceftriaxone and Flagyl, given concern for diverticulitis versus infectious cause of colitis  - Outpatient follow-up with GI and colonoscopy in roughly 1 to 2 months    GI bleed  Assessment & Plan  Has had multiple episodes of bloody stool, noted since admission.   Significantly improved.  -H&H stable, continue to monitor regularly  -Type and cross match. Transfuse Hgb if <7  -Continue oral Protonix  -GI consulted, appreciate recs.  Colonoscopy not recommended at this time.  Recommend outpatient follow-up with gastroenterology and colonoscopy in roughly 1 to 2 months.  S/p EGD (2/2/23): no bleeding source found. GERD grade B. Consider repeat EGD in 2-3 months to assess for Ferguson's esophagus    Postural dizziness with presyncope- (present on admission)  Assessment & Plan  Consider secondary to GIB/dehydration or bradycardia  Noted 5 beats of NSVT noted on admission  No recurrence noted on telemetry  -CTM    Hypercholesteremia  Assessment & Plan  -Start Crestor 20mg    Nonsustained ventricular tachycardia  Assessment & Plan  Consider secondary to GIB/volume depletion.  -Telemetry  -Cardiac echo pending  -Monitor electrolytes, keep potassium above 4 and magnesium above 2         VTE prophylaxis: SCDs/TEDs due to concerns for GIB    I have performed a physical exam and reviewed and updated ROS and Plan today (2/4/2023). In review of yesterday's note (2/3/2023), there are no changes except as documented above.

## 2023-02-04 NOTE — ANESTHESIA TIME REPORT
Anesthesia Start and Stop Event Times     Date Time Event    2/2/2023 1325 Ready for Procedure     1330 Anesthesia Start     1350 Anesthesia Stop        Responsible Staff  02/02/23    Name Role Begin End    Marco Mendoza M.D. Anesth 1330 1350        Overtime Reason:  no overtime (within assigned shift)    Comments:

## 2023-02-04 NOTE — ASSESSMENT & PLAN NOTE
CT abdomen pelvis on 2/2/2023 significant for segmental colonic wall thickening with pericolonic fat involving the descending colon and proximal sigmoid colon, favoring colitis and diverticulosis, per report.    Potential causes include colitis versus diverticulitis versus infectious versus inferior mesenteric artery ischemia, given history of hypercholesterolemia    -CTA abdomen pelvis did not show any evidence of stenosis or obstruction of celiac artery, superior mesenteric artery or inferior mesenteric artery, per report. However, did show signs of continued colitis versus diverticulitis.   - GI does not recommend colonoscopy at this time, but will follow-up outpatient for colonoscopy once inflammation and acuity of condition improves.  -Slowly advanced diet as tolerated.  Will attempt regular, low-fat diet today.  - Continue ceftriaxone and Flagyl, given concern for diverticulitis versus infectious cause of colitis.  Consider discharge on ciprofloxacin.  - Outpatient follow-up with GI and colonoscopy in roughly 1 to 2 months

## 2023-02-04 NOTE — CARE PLAN
The patient is Stable - Low risk of patient condition declining or worsening    Shift Goals  Clinical Goals: Monitor for diarrhea and stomach discomfort  Patient Goals: Comfort, figure out plan of care  Family Goals: Not present, DANICA    Progress made toward(s) clinical / shift goals:      Problem: Gastrointestinal Irritability  Goal: Nausea and vomiting will be absent or improve  Outcome: Progressing  Goal: Diarrhea will be absent or improved  Outcome: Progressing  Note: Patient is still having diarrhea and experiencing stomach upset, MD's aware and further testing ordered.

## 2023-02-04 NOTE — CARE PLAN
The patient is Stable - Low risk of patient condition declining or worsening    Shift Goals  Clinical Goals: Bleeding controlled H&H 12.0/37.5. 2/4 H&H 11.9 & 36.4, BUN 6 trending down  Patient Goals: Rest/comfort  Family Goals: Not present, DANICA    Progress made toward(s) clinical / shift goals:    Problem: Pain - Standard  Goal: Alleviation of pain or a reduction in pain to the patient’s comfort goal  2/3/2023 2331 by Noemí Patel R.N.  Outcome: Progressing Left lower & upper quadrant tenderness to touch treated with Tylenol 650 mg with good effect.    Problem: Gastrointestinal Irritability  Goal: Nausea and vomiting will be absent or improve  2/3/2023 2331 by Noemí Patel R.N.  Outcome: Progressing Pt did not experience nausea or emesis this shift, and was compliant with clear fluids diet.    Problem: Gastrointestinal Irritability  Goal: Diarrhea will be absent or improved Pt did not experience bloody, loose, stools this shift.  As of 3:00 a.m. Pt has reported having 3 bouts of watery diarrhea, NO blood in stool.  Pt making progress in regards to care plan and shift goals. GI signed off 2/3. Pt to keep a daily PPI for upper GI. F/U as outpatient in 2 to 3 mos for repeat colonoscopy.

## 2023-02-05 LAB
ALBUMIN SERPL BCP-MCNC: 3.7 G/DL (ref 3.2–4.9)
ALBUMIN/GLOB SERPL: 1.5 G/DL
ALP SERPL-CCNC: 49 U/L (ref 30–99)
ALT SERPL-CCNC: 13 U/L (ref 2–50)
ANION GAP SERPL CALC-SCNC: 10 MMOL/L (ref 7–16)
AST SERPL-CCNC: 21 U/L (ref 12–45)
BASOPHILS # BLD AUTO: 0.5 % (ref 0–1.8)
BASOPHILS # BLD: 0.04 K/UL (ref 0–0.12)
BILIRUB SERPL-MCNC: 0.4 MG/DL (ref 0.1–1.5)
BUN SERPL-MCNC: 5 MG/DL (ref 8–22)
CALCIUM ALBUM COR SERPL-MCNC: 9.2 MG/DL (ref 8.5–10.5)
CALCIUM SERPL-MCNC: 9 MG/DL (ref 8.5–10.5)
CHLORIDE SERPL-SCNC: 103 MMOL/L (ref 96–112)
CO2 SERPL-SCNC: 26 MMOL/L (ref 20–33)
CREAT SERPL-MCNC: 0.76 MG/DL (ref 0.5–1.4)
EOSINOPHIL # BLD AUTO: 0.2 K/UL (ref 0–0.51)
EOSINOPHIL NFR BLD: 2.7 % (ref 0–6.9)
ERYTHROCYTE [DISTWIDTH] IN BLOOD BY AUTOMATED COUNT: 43.5 FL (ref 35.9–50)
GFR SERPLBLD CREATININE-BSD FMLA CKD-EPI: 90 ML/MIN/1.73 M 2
GLOBULIN SER CALC-MCNC: 2.4 G/DL (ref 1.9–3.5)
GLUCOSE SERPL-MCNC: 91 MG/DL (ref 65–99)
HCT VFR BLD AUTO: 38.7 % (ref 37–47)
HGB BLD-MCNC: 12.8 G/DL (ref 12–16)
IMM GRANULOCYTES # BLD AUTO: 0.03 K/UL (ref 0–0.11)
IMM GRANULOCYTES NFR BLD AUTO: 0.4 % (ref 0–0.9)
LYMPHOCYTES # BLD AUTO: 1.92 K/UL (ref 1–4.8)
LYMPHOCYTES NFR BLD: 25.5 % (ref 22–41)
MAGNESIUM SERPL-MCNC: 1.8 MG/DL (ref 1.5–2.5)
MCH RBC QN AUTO: 29.6 PG (ref 27–33)
MCHC RBC AUTO-ENTMCNC: 33.1 G/DL (ref 33.6–35)
MCV RBC AUTO: 89.6 FL (ref 81.4–97.8)
MONOCYTES # BLD AUTO: 0.49 K/UL (ref 0–0.85)
MONOCYTES NFR BLD AUTO: 6.5 % (ref 0–13.4)
NEUTROPHILS # BLD AUTO: 4.85 K/UL (ref 2–7.15)
NEUTROPHILS NFR BLD: 64.4 % (ref 44–72)
NRBC # BLD AUTO: 0 K/UL
NRBC BLD-RTO: 0 /100 WBC
PHOSPHATE SERPL-MCNC: 3.2 MG/DL (ref 2.5–4.5)
PLATELET # BLD AUTO: 250 K/UL (ref 164–446)
PMV BLD AUTO: 10.8 FL (ref 9–12.9)
POTASSIUM SERPL-SCNC: 4.1 MMOL/L (ref 3.6–5.5)
PROT SERPL-MCNC: 6.1 G/DL (ref 6–8.2)
RBC # BLD AUTO: 4.32 M/UL (ref 4.2–5.4)
SODIUM SERPL-SCNC: 139 MMOL/L (ref 135–145)
WBC # BLD AUTO: 7.5 K/UL (ref 4.8–10.8)

## 2023-02-05 PROCEDURE — 80053 COMPREHEN METABOLIC PANEL: CPT

## 2023-02-05 PROCEDURE — 84100 ASSAY OF PHOSPHORUS: CPT

## 2023-02-05 PROCEDURE — 36415 COLL VENOUS BLD VENIPUNCTURE: CPT

## 2023-02-05 PROCEDURE — 770006 HCHG ROOM/CARE - MED/SURG/GYN SEMI*

## 2023-02-05 PROCEDURE — 700102 HCHG RX REV CODE 250 W/ 637 OVERRIDE(OP): Performed by: STUDENT IN AN ORGANIZED HEALTH CARE EDUCATION/TRAINING PROGRAM

## 2023-02-05 PROCEDURE — 85025 COMPLETE CBC W/AUTO DIFF WBC: CPT

## 2023-02-05 PROCEDURE — 700111 HCHG RX REV CODE 636 W/ 250 OVERRIDE (IP): Performed by: STUDENT IN AN ORGANIZED HEALTH CARE EDUCATION/TRAINING PROGRAM

## 2023-02-05 PROCEDURE — 99232 SBSQ HOSP IP/OBS MODERATE 35: CPT | Mod: GC | Performed by: HOSPITALIST

## 2023-02-05 PROCEDURE — A9270 NON-COVERED ITEM OR SERVICE: HCPCS | Performed by: HOSPITALIST

## 2023-02-05 PROCEDURE — 83735 ASSAY OF MAGNESIUM: CPT

## 2023-02-05 PROCEDURE — 700102 HCHG RX REV CODE 250 W/ 637 OVERRIDE(OP): Performed by: HOSPITALIST

## 2023-02-05 PROCEDURE — A9270 NON-COVERED ITEM OR SERVICE: HCPCS | Performed by: STUDENT IN AN ORGANIZED HEALTH CARE EDUCATION/TRAINING PROGRAM

## 2023-02-05 RX ORDER — CHOLECALCIFEROL (VITAMIN D3) 125 MCG
5 CAPSULE ORAL NIGHTLY PRN
Status: DISCONTINUED | OUTPATIENT
Start: 2023-02-05 | End: 2023-02-06 | Stop reason: HOSPADM

## 2023-02-05 RX ADMIN — METRONIDAZOLE 500 MG: 500 TABLET ORAL at 13:03

## 2023-02-05 RX ADMIN — METRONIDAZOLE 500 MG: 500 TABLET ORAL at 04:48

## 2023-02-05 RX ADMIN — CEFTRIAXONE SODIUM 2000 MG: 10 INJECTION, POWDER, FOR SOLUTION INTRAVENOUS at 04:49

## 2023-02-05 RX ADMIN — METRONIDAZOLE 500 MG: 500 TABLET ORAL at 22:02

## 2023-02-05 RX ADMIN — OMEPRAZOLE 20 MG: 20 CAPSULE, DELAYED RELEASE ORAL at 04:49

## 2023-02-05 RX ADMIN — ROSUVASTATIN CALCIUM 20 MG: 5 TABLET, COATED ORAL at 16:32

## 2023-02-05 RX ADMIN — OMEPRAZOLE 20 MG: 20 CAPSULE, DELAYED RELEASE ORAL at 16:32

## 2023-02-05 ASSESSMENT — ENCOUNTER SYMPTOMS
MYALGIAS: 0
DIZZINESS: 0
PALPITATIONS: 0
VOMITING: 0
SHORTNESS OF BREATH: 0
HEADACHES: 0
FEVER: 0
COUGH: 0
ABDOMINAL PAIN: 1
FALLS: 0
CHILLS: 0
NAUSEA: 0
BLOOD IN STOOL: 1
WHEEZING: 0
BACK PAIN: 0
WEIGHT LOSS: 0

## 2023-02-05 ASSESSMENT — PAIN DESCRIPTION - PAIN TYPE: TYPE: ACUTE PAIN

## 2023-02-05 NOTE — PROGRESS NOTES
Monitor summary:     Rhythm : SR  Rate : H - 77  L - 70  Ectopy : PVC    VA=.20  QRS=.08  QT=.4        Per telemetry strip summary from monitor room.

## 2023-02-05 NOTE — PROGRESS NOTES
[facility] Internal Medicine Daily Progress Note    Date of Service  2/5/2023    R Team: R IM Green Team   Attending: KELVIN Hicks M.d.  Senior Resident: Dr. Santacruz  Intern:  Dr. Lucero  Contact Number: [phone]    Chief Complaint  Chief Complaint   Patient presents with    Lightheadedness    Abdominal Cramps       Hospital Course  Patient is a 62 year-old female with a past medical history of depression and obesity who presented on 2/1/23 due to dizziness, palpitations, and abdominal cramping. She notes one day sudden onset of dizziness/SOB, lightheadedness but denies loss of consciousness of head injury. Of note, patient also notes intermittently occurring episodes of palpitations. While in the ED, patient had a bloody bowel movement, first occurrence. Was prescribed ibuprofen 800mg three times daily, but denies use in the last week. Denies anticoagulation or ASA use. Reports recent colonoscopy (within the last few years) with normal findings. She denies fever, chills, weight changes, fatigue, N/V, changes in appetite, changes in stool or family history of colon cancer. Five beats of vtach was noted while in ED, EKG revealed first degree heart block. CXR negative for acute cardiopulmonary processes. She was started on IV PPI twice daily. H&H trended.    Around five episodes of bloody stools noted since admission. GI consulted for ongoing GI bleed. For EGD today (2/2/2023).    Patient had CTA abdomen pelvis on 2//2023 did not show any signs of stenosis or obstruction of celiac artery, superior mesenteric artery or inferior mesenteric artery, per report.  However, did show signs of continued colitis versus diverticulitis.    Interval Problem Update  CTA abdomen pelvis on 2//2023 did not show any signs of stenosis or obstruction of celiac artery, superior mesenteric artery or inferior mesenteric artery, per report.  However, did show signs of continued colitis versus diverticulitis.  She was able to  "slowly advance her diet yesterday, and was tolerating broth and Jell-O.  She reports that her abdominal pain was not nearly as severe as the day prior when she tried to eat the same foods.  She does continue to have some left lower quadrant discomfort, crampy pain, but is much improved.  She also continues to have diarrhea.  C. difficile stool sample negative.  She denies any nausea, vomiting, diarrhea, fevers, chills, night sweats.  She does not have any leukocytosis.    The patient spoke with her mother for the first time in 7 years today and reports a family history of colon cancer.  Per patient, her mother states that her aunts on her mother side have a history of colon cancer.  She also states that her mom did a \"stool test\" and had \"markers\" that were positive, but she has not a colonoscopy yet.    Case was discussed with gastroenterology, who did not recommend colonoscopy at this time given active colitis and diverticulitis.  Plan for follow-up outpatient after discharge with colonoscopy after inflammation is improved.    I have discussed this patient's plan of care and discharge plan at IDT rounds today with Case Management, Nursing, Nursing leadership, and other members of the IDT team.    Consultants/Specialty  GI    Code Status  Full Code    Disposition  Patient is not medically cleared for discharge.   Anticipate discharge to to home with close outpatient follow-up.  I have placed the appropriate orders for post-discharge needs.    Review of Systems  Review of Systems   Constitutional:  Negative for chills, fever, malaise/fatigue and weight loss.   Respiratory:  Negative for cough, shortness of breath and wheezing.    Cardiovascular:  Negative for chest pain, palpitations and leg swelling.   Gastrointestinal:  Positive for abdominal pain (Intermittent left lower quadrant Cramping pain) and blood in stool. Negative for nausea and vomiting.   Genitourinary:  Negative for hematuria.   Musculoskeletal:  " Negative for back pain, falls and myalgias.   Neurological:  Negative for dizziness and headaches.      Physical Exam  Temp:  [36.2 °C (97.2 °F)-36.9 °C (98.5 °F)] 36.2 °C (97.2 °F)  Pulse:  [68-79] 68  Resp:  [16-18] 18  BP: (132-148)/(73-84) 137/74  SpO2:  [90 %-98 %] 97 %    Physical Exam  Constitutional:       General: She is not in acute distress.     Appearance: She is obese. She is not ill-appearing or toxic-appearing.   HENT:      Head: Normocephalic and atraumatic.      Right Ear: External ear normal.      Left Ear: External ear normal.      Nose: Nose normal. No rhinorrhea.      Mouth/Throat:      Mouth: Mucous membranes are moist.   Eyes:      General: No scleral icterus.        Right eye: No discharge.         Left eye: No discharge.      Extraocular Movements: Extraocular movements intact.      Conjunctiva/sclera: Conjunctivae normal.   Cardiovascular:      Rate and Rhythm: Normal rate and regular rhythm.      Pulses: Normal pulses.      Heart sounds: No murmur heard.  Pulmonary:      Effort: Pulmonary effort is normal. No respiratory distress.      Breath sounds: No wheezing.   Abdominal:      General: Bowel sounds are normal.      Palpations: Abdomen is soft.      Tenderness: There is abdominal tenderness (Minimal left lower quadrant tenderness/discomfort on palpation.). There is no guarding or rebound.      Hernia: No hernia is present.   Musculoskeletal:         General: No swelling. Normal range of motion.      Cervical back: Normal range of motion.   Skin:     General: Skin is warm and dry.   Neurological:      General: No focal deficit present.      Mental Status: She is alert and oriented to person, place, and time.   Psychiatric:         Mood and Affect: Mood normal.         Behavior: Behavior normal.         Thought Content: Thought content normal.         Judgment: Judgment normal.       Fluids    Intake/Output Summary (Last 24 hours) at 2/5/2023 1310  Last data filed at 2/4/2023 1400  Gross  per 24 hour   Intake 0 ml   Output --   Net 0 ml       Laboratory  Recent Labs     02/03/23  1257 02/04/23  0244 02/05/23  0855   WBC 10.1 9.0 7.5   RBC 4.06* 3.99* 4.32   HEMOGLOBIN 12.0 11.9* 12.8   HEMATOCRIT 37.5 36.4* 38.7   MCV 92.4 91.2 89.6   MCH 29.6 29.8 29.6   MCHC 32.0* 32.7* 33.1*   RDW 47.6 45.5 43.5   PLATELETCT 227 191 250   MPV 11.0 10.7 10.8     Recent Labs     02/03/23  0002 02/04/23  0244 02/05/23  0900   SODIUM 139 139 139   POTASSIUM 4.1 3.8 4.1   CHLORIDE 105 108 103   CO2 25 23 26   GLUCOSE 96 89 91   BUN 7* 6* 5*   CREATININE 0.85 0.73 0.76   CALCIUM 8.5 8.4* 9.0                       Imaging  CTA ABDOMEN PELVIS W & W/O POST PROCESS   Final Result      1.  Unremarkable CTA of the mesenteric vessels   2.  Ongoing distal colitis or diverticulitis   3.  Trace pelvic fluid   4.  7.5 cm liver hemangioma      EC-ECHOCARDIOGRAM COMPLETE W/O CONT   Final Result      CT-ABDOMEN-PELVIS WITH   Final Result         1.  Segmental area of colonic wall thickening with pericolonic fat involving the descending colon and proximal sigmoid colon is seen, appearance favors subsegmental colitis. Consider inflammatory or ischemic versus ischemic etiology in the appropriate    clinical setting.   2.  Small collection of ascites in the pelvis   3.  Diverticulosis.   4.  Atherosclerosis   5.  Large right hepatic lobe lesion, appearance most typical of large hemangioma.      [id number] VIEW   Final Result      1.  Nonspecific bowel gas pattern.      DX-CHEST-PORTABLE (1 VIEW)   Final Result         No acute cardiac or pulmonary abnormality is identified.           Assessment/Plan:  62 year-old female with a past medical history of depression and obesity who was admitted for abdominal pain, GI bleed, with concern of mesenteric ischemia versus colitis versus diverticulitis.    Abdominal pain  Assessment & Plan  CT abdomen pelvis on 2/2/2023 significant for segmental colonic wall thickening with pericolonic fat  involving the descending colon and proximal sigmoid colon, favoring colitis and diverticulosis, per report.    Potential causes include colitis versus diverticulitis versus infectious versus inferior mesenteric artery ischemia, given history of hypercholesterolemia    -CTA abdomen pelvis did not show any evidence of stenosis or obstruction of celiac artery, superior mesenteric artery or inferior mesenteric artery, per report. However, did show signs of continued colitis versus diverticulitis.   - GI does not recommend colonoscopy at this time, but will follow-up outpatient for colonoscopy once inflammation and acuity of condition improves.  -Slowly advanced diet as tolerated.  Will attempt regular, low-fat diet today.  - Continue ceftriaxone and Flagyl, given concern for diverticulitis versus infectious cause of colitis.  Consider discharge on ciprofloxacin.  - Outpatient follow-up with GI and colonoscopy in roughly 1 to 2 months    GI bleed  Assessment & Plan  Has had multiple episodes of bloody stool, noted since admission.  Significantly improved.  -H&H stable, continue to monitor regularly  -Type and cross match. Transfuse Hgb if <7  -Continue oral Protonix  -GI consulted, appreciate recs.  Colonoscopy not recommended at this time.  Recommend outpatient follow-up with gastroenterology and colonoscopy in roughly 1 to 2 months.  S/p EGD (2/2/23): no bleeding source found. GERD grade B. Consider repeat EGD in 2-3 months to assess for Ferguson's esophagus    Postural dizziness with presyncope- (present on admission)  Assessment & Plan  Consider secondary to GIB/dehydration or bradycardia  Noted 5 beats of NSVT noted on admission  No recurrence noted on telemetry  -CTM    Hypercholesteremia  Assessment & Plan  -Start Crestor 20mg    Nonsustained ventricular tachycardia  Assessment & Plan  Consider secondary to GIB/volume depletion.  -Telemetry  -Cardiac echo pending  -Monitor electrolytes, keep potassium above 4 and  magnesium above 2         VTE prophylaxis: SCDs/TEDs due to concerns for GIB    I have performed a physical exam and reviewed and updated ROS and Plan today (2/5/2023). In review of yesterday's note (2/4/2023), there are no changes except as documented above.

## 2023-02-05 NOTE — CARE PLAN
The patient is Stable - Low risk of patient condition declining or worsening    Shift Goals  Clinical Goals: Monitor for Diarrhea and GI discomfort  Patient Goals: plan of care progress  Family Goals: DANICA    Progress made toward(s) clinical / shift goals:        Problem: Pain - Standard  Goal: Alleviation of pain or a reduction in pain to the patient’s comfort goal  Outcome: Progressing  Note: Patient is able to successfully verbalize pain scale, discomfort, and voice needs and desire regarding any intervention.     Problem: Bowel Elimination  Goal: Establish and maintain regular bowel function  Outcome: Progressing  Note: Patient is reporting improvement in bowel movements and frequency.      Problem: Gastrointestinal Irritability  Goal: Nausea and vomiting will be absent or improve  Outcome: Progressing  Note: Patient verbalized improvement in nausea and desire to eat.   Goal: Diarrhea will be absent or improved  Outcome: Progressing  Note: Patient has expressed improvement in diarrhea.

## 2023-02-06 VITALS
HEART RATE: 70 BPM | RESPIRATION RATE: 18 BRPM | HEIGHT: 65 IN | TEMPERATURE: 97.6 F | SYSTOLIC BLOOD PRESSURE: 139 MMHG | DIASTOLIC BLOOD PRESSURE: 79 MMHG | BODY MASS INDEX: 38.46 KG/M2 | WEIGHT: 230.82 LBS | OXYGEN SATURATION: 98 %

## 2023-02-06 PROBLEM — R10.9 ABDOMINAL PAIN: Status: RESOLVED | Noted: 2023-02-04 | Resolved: 2023-02-06

## 2023-02-06 LAB
ALBUMIN SERPL BCP-MCNC: 3.6 G/DL (ref 3.2–4.9)
BASOPHILS # BLD AUTO: 0.5 % (ref 0–1.8)
BASOPHILS # BLD: 0.03 K/UL (ref 0–0.12)
BUN SERPL-MCNC: 11 MG/DL (ref 8–22)
CALCIUM ALBUM COR SERPL-MCNC: 9.1 MG/DL (ref 8.5–10.5)
CALCIUM SERPL-MCNC: 8.8 MG/DL (ref 8.5–10.5)
CHLORIDE SERPL-SCNC: 106 MMOL/L (ref 96–112)
CO2 SERPL-SCNC: 24 MMOL/L (ref 20–33)
CREAT SERPL-MCNC: 0.83 MG/DL (ref 0.5–1.4)
EOSINOPHIL # BLD AUTO: 0.21 K/UL (ref 0–0.51)
EOSINOPHIL NFR BLD: 3.5 % (ref 0–6.9)
ERYTHROCYTE [DISTWIDTH] IN BLOOD BY AUTOMATED COUNT: 43.6 FL (ref 35.9–50)
GFR SERPLBLD CREATININE-BSD FMLA CKD-EPI: 81 ML/MIN/1.73 M 2
GLUCOSE SERPL-MCNC: 100 MG/DL (ref 65–99)
HCT VFR BLD AUTO: 38.8 % (ref 37–47)
HGB BLD-MCNC: 12.7 G/DL (ref 12–16)
IMM GRANULOCYTES # BLD AUTO: 0.01 K/UL (ref 0–0.11)
IMM GRANULOCYTES NFR BLD AUTO: 0.2 % (ref 0–0.9)
LYMPHOCYTES # BLD AUTO: 1.84 K/UL (ref 1–4.8)
LYMPHOCYTES NFR BLD: 30.7 % (ref 22–41)
MAGNESIUM SERPL-MCNC: 1.8 MG/DL (ref 1.5–2.5)
MCH RBC QN AUTO: 29.2 PG (ref 27–33)
MCHC RBC AUTO-ENTMCNC: 32.7 G/DL (ref 33.6–35)
MCV RBC AUTO: 89.2 FL (ref 81.4–97.8)
MONOCYTES # BLD AUTO: 0.43 K/UL (ref 0–0.85)
MONOCYTES NFR BLD AUTO: 7.2 % (ref 0–13.4)
NEUTROPHILS # BLD AUTO: 3.48 K/UL (ref 2–7.15)
NEUTROPHILS NFR BLD: 57.9 % (ref 44–72)
NRBC # BLD AUTO: 0 K/UL
NRBC BLD-RTO: 0 /100 WBC
PHOSPHATE SERPL-MCNC: 3.8 MG/DL (ref 2.5–4.5)
PLATELET # BLD AUTO: 246 K/UL (ref 164–446)
PMV BLD AUTO: 10.6 FL (ref 9–12.9)
POTASSIUM SERPL-SCNC: 4.1 MMOL/L (ref 3.6–5.5)
RBC # BLD AUTO: 4.35 M/UL (ref 4.2–5.4)
SODIUM SERPL-SCNC: 140 MMOL/L (ref 135–145)
WBC # BLD AUTO: 6 K/UL (ref 4.8–10.8)

## 2023-02-06 PROCEDURE — 85025 COMPLETE CBC W/AUTO DIFF WBC: CPT

## 2023-02-06 PROCEDURE — 36415 COLL VENOUS BLD VENIPUNCTURE: CPT

## 2023-02-06 PROCEDURE — A9270 NON-COVERED ITEM OR SERVICE: HCPCS | Performed by: HOSPITALIST

## 2023-02-06 PROCEDURE — 80069 RENAL FUNCTION PANEL: CPT

## 2023-02-06 PROCEDURE — 700111 HCHG RX REV CODE 636 W/ 250 OVERRIDE (IP): Performed by: STUDENT IN AN ORGANIZED HEALTH CARE EDUCATION/TRAINING PROGRAM

## 2023-02-06 PROCEDURE — 700102 HCHG RX REV CODE 250 W/ 637 OVERRIDE(OP)

## 2023-02-06 PROCEDURE — 700102 HCHG RX REV CODE 250 W/ 637 OVERRIDE(OP): Performed by: HOSPITALIST

## 2023-02-06 PROCEDURE — A9270 NON-COVERED ITEM OR SERVICE: HCPCS

## 2023-02-06 PROCEDURE — 700111 HCHG RX REV CODE 636 W/ 250 OVERRIDE (IP)

## 2023-02-06 PROCEDURE — 99239 HOSP IP/OBS DSCHRG MGMT >30: CPT | Mod: GC | Performed by: HOSPITALIST

## 2023-02-06 PROCEDURE — 83735 ASSAY OF MAGNESIUM: CPT

## 2023-02-06 RX ORDER — MAGNESIUM SULFATE HEPTAHYDRATE 40 MG/ML
2 INJECTION, SOLUTION INTRAVENOUS ONCE
Status: DISCONTINUED | OUTPATIENT
Start: 2023-02-06 | End: 2023-02-06

## 2023-02-06 RX ORDER — LANOLIN ALCOHOL/MO/W.PET/CERES
400 CREAM (GRAM) TOPICAL ONCE
Status: COMPLETED | OUTPATIENT
Start: 2023-02-06 | End: 2023-02-06

## 2023-02-06 RX ORDER — OMEPRAZOLE 20 MG/1
20 CAPSULE, DELAYED RELEASE ORAL 2 TIMES DAILY
Qty: 60 CAPSULE | Refills: 0 | Status: SHIPPED | OUTPATIENT
Start: 2023-02-06 | End: 2023-03-08

## 2023-02-06 RX ORDER — ROSUVASTATIN CALCIUM 20 MG/1
20 TABLET, COATED ORAL EVERY EVENING
Qty: 30 TABLET | Refills: 11 | Status: SHIPPED
Start: 2023-02-06 | End: 2023-05-09

## 2023-02-06 RX ORDER — CIPROFLOXACIN 500 MG/1
500 TABLET, FILM COATED ORAL 2 TIMES DAILY
Qty: 13 TABLET | Refills: 0 | Status: ACTIVE | OUTPATIENT
Start: 2023-02-06 | End: 2023-02-13

## 2023-02-06 RX ADMIN — OMEPRAZOLE 20 MG: 20 CAPSULE, DELAYED RELEASE ORAL at 04:55

## 2023-02-06 RX ADMIN — CEFTRIAXONE SODIUM 2000 MG: 10 INJECTION, POWDER, FOR SOLUTION INTRAVENOUS at 04:55

## 2023-02-06 RX ADMIN — ACETAMINOPHEN 650 MG: 325 TABLET, FILM COATED ORAL at 01:41

## 2023-02-06 RX ADMIN — MAGNESIUM SULFATE HEPTAHYDRATE 2 G: 40 INJECTION, SOLUTION INTRAVENOUS at 08:26

## 2023-02-06 RX ADMIN — Medication 400 MG: at 10:04

## 2023-02-06 RX ADMIN — METRONIDAZOLE 500 MG: 500 TABLET ORAL at 04:55

## 2023-02-06 ASSESSMENT — PAIN DESCRIPTION - PAIN TYPE
TYPE: ACUTE PAIN
TYPE: ACUTE PAIN

## 2023-02-06 NOTE — DISCHARGE SUMMARY
[facility] Internal Medicine Discharge Summary    Attending: KELVIN Hicks M.D.  Senior Resident: Dr. Santacruz  Intern:  Dr. Lucero  Contact Number: [phone]    CHIEF COMPLAINT ON ADMISSION  Chief Complaint   Patient presents with    Lightheadedness    Abdominal Cramps       Reason for Admission  Abdominal pain, gastrointestinal bleed likely secondary to diverticulosis    Admission Date  2/1/2023    Discharge Date  2/6/2023    CODE STATUS  Full Code    HPI & HOSPITAL COURSE  Patient is a 62 year-old female with a past medical history of depression and obesity who presented on 2/1/23 due to dizziness, palpitations, and abdominal cramping. She noted one day sudden onset of dizziness/SOB and lightheadedness but denied loss of consciousness or head injury. Of note, patient also reported intermittently occurring episodes of palpitations. While in the ED, patient had a bloody bowel movement, first occurrence. Was prescribed ibuprofen 800mg three times daily, but denied use 1 week prior to presentation. Denied anticoagulation or ASA use. Reported recent colonoscopy (within the last few years) with normal findings, but with significant family history of colorectal carcinoma. She denied  fever, chills, weight changes, fatigue, N/V, changes in appetite, or changes in stool. Five beats of vtach was noted while in ED, EKG revealed first degree heart block. CXR negative for acute cardiopulmonary processes. She was started on IV fluids, IV  PPI twice daily. H&H trended and remained stable.   During hospitalization, patient had around five episodes of bloody stools. Patient also reported LLQ abdominal pain, cramping in character and was empirically started on IV Rocephin and Flagyl for concerns of diverticulitis. GI consulted for ongoing GI bleed. EGD (2/2/2023) without source of bleeding. CTA abdomen/pelvis (2/4/2023) did not show any signs of stenosis or obstruction of celiac artery/superior mesenteric artery/inferior  mesenteric artery per report, however, did show signs of distal colitis versus diverticulitis. Patient's diet was slowly advanced and tolerated well, with notable improvement of abdominal pain but still with episodes of diarrhea, nonbloody. Spoke with GI again and recommended outpatient follow up in 2 months for repeat colonoscopy. Patient discharged on ciprofloxacin to complete 10 day course of antibiotics.    Therefore, she is discharged in good and stable condition to home with close outpatient follow-up.    The patient met 2-midnight criteria for an inpatient stay at the time of discharge.      Physical Exam on Day of Discharge    Physical Exam  Constitutional:       General: She is not in acute distress.     Appearance: She is obese. She is not ill-appearing, toxic-appearing or diaphoretic.   HENT:      Head: Normocephalic and atraumatic.      Right Ear: External ear normal.      Left Ear: External ear normal.      Nose: Nose normal. No rhinorrhea.      Mouth/Throat:      Mouth: Mucous membranes are moist.      Pharynx: Oropharynx is clear.   Eyes:      Extraocular Movements: Extraocular movements intact.      Conjunctiva/sclera: Conjunctivae normal.   Cardiovascular:      Rate and Rhythm: Normal rate and regular rhythm.      Pulses: Normal pulses.      Heart sounds: No murmur heard.  Pulmonary:      Effort: Pulmonary effort is normal. No respiratory distress.      Breath sounds: Normal breath sounds. No wheezing.   Abdominal:      General: There is no distension.      Palpations: Abdomen is soft.      Tenderness: There is abdominal tenderness (mild LLQ). There is no guarding or rebound.   Musculoskeletal:         General: No swelling or tenderness. Normal range of motion.      Cervical back: Normal range of motion and neck supple.   Skin:     General: Skin is warm and dry.      Capillary Refill: Capillary refill takes less than 2 seconds.   Neurological:      General: No focal deficit present.      Mental  Status: She is alert and oriented to person, place, and time.      Motor: No weakness.   Psychiatric:         Mood and Affect: Mood normal.         Behavior: Behavior normal.         Thought Content: Thought content normal.         Judgment: Judgment normal.       FOLLOW UP ITEMS POST DISCHARGE  Follow up with primary care provider for chronic medical conditions. For colonoscopy in 2 months, outpatient GI referral placed. Atorvastatin started given atherosclerosis seen on imaging - ASCVD risk calculated to be low (~4.6%), consider risks/benefits of continuing vs stopping medication.    DISCHARGE DIAGNOSES  Active Problems:    GI bleed POA: Unknown    Nonsustained ventricular tachycardia POA: Unknown    Colitis POA: Unknown    Abdominal pain POA: Unknown    Postural dizziness with presyncope POA: Yes    Hypercholesteremia POA: Unknown  Resolved Problems:    * No resolved hospital problems. *      FOLLOW UP  Future Appointments   Date Time Provider Department Center   2/22/2023  9:00 AM Annmarie De Oliveira M.D. [facility]     MEDICATIONS ON DISCHARGE     Medication List        START taking these medications        Instructions   ciprofloxacin 500 MG Tabs  Commonly known as: CIPRO   Take 1 Tablet by mouth 2 times a day for 13 doses.  Dose: 500 mg     omeprazole 20 MG delayed-release capsule  Commonly known as: PRILOSEC   Take 1 Capsule by mouth 2 times a day for 30 days.  Dose: 20 mg     rosuvastatin 20 MG Tabs  Commonly known as: CRESTOR   Take 1 Tablet by mouth every evening.  Dose: 20 mg            CONTINUE taking these medications        Instructions   albuterol 108 (90 Base) MCG/ACT Aers inhalation aerosol   Doctor's comments: Give albuterol that is patient or insurance preference please  Inhale 2 Puffs every four hours as needed for Shortness of Breath.  Dose: 2 Puff     cyclobenzaprine 5 mg tablet  Commonly known as: Flexeril   Take 1 Tablet by mouth 3 times a day as needed for Muscle Spasms.  Dose: 5 mg      Dextromethorphan-guaiFENesin  MG/5ML Syrp  Commonly known as: TUSSIN DM   Take 5 mL by mouth every 6 hours as needed (cough).  Dose: 5 mL     fish oil 1000 MG Caps capsule   Take 1,000 mg by mouth 2 times a day.  Dose: 1,000 mg     therapeutic multivitamin-minerals Tabs   Take 1 Tablet by mouth every day.  Dose: 1 Tablet     vitamin D3 1000 Unit (25 mcg) Tabs  Commonly known as: cholecalciferol   Take 2,000 Units by mouth every day.  Dose: 2,000 Units     zinc sulfate 220 (50 Zn) MG Caps  Commonly known as: ZINCATE   Take 220 mg by mouth every day.  Dose: 220 mg            STOP taking these medications      ibuprofen 800 MG Tabs  Commonly known as: MOTRIN              Allergies  No Known Allergies    DIET  Orders Placed This Encounter   Procedures    Diet Order Diet: Regular; Nutrient modifications: (optional): Low Fat; Miscellaneous modifications: (optional): Lactose Free     Standing Status:   Standing     Number of Occurrences:   1     Order Specific Question:   Diet:     Answer:   Regular [1]     Order Specific Question:   Nutrient modifications: (optional)     Answer:   Low Fat [5]     Order Specific Question:   Miscellaneous modifications: (optional)     Answer:   Lactose Free [5]       ACTIVITY  As tolerated.  Weight bearing as tolerated    CONSULTATIONS  Gastroenterology    PROCEDURES  Esophagogastroduodenoscopy (2/2/2023)    LABORATORY  Lab Results   Component Value Date    SODIUM 140 02/06/2023    POTASSIUM 4.1 02/06/2023    CHLORIDE 106 02/06/2023    CO2 24 02/06/2023    GLUCOSE 100 (H) 02/06/2023    BUN 11 02/06/2023    CREATININE 0.83 02/06/2023        Lab Results   Component Value Date    WBC 6.0 02/06/2023    HEMOGLOBIN 12.7 02/06/2023    HEMATOCRIT 38.8 02/06/2023    PLATELETCT 246 02/06/2023        Total time of the discharge process exceeds 29 minutes.

## 2023-02-06 NOTE — DISCHARGE PLANNING
Case Management Discharge Planning    Admission Date: 2/1/2023  GMLOS: 3  ALOS: 3    6-Clicks ADL Score: 24  6-Clicks Mobility Score: 22      Anticipated Discharge Dispo:      DME Needed: No    Action(s) Taken: CM completed chart review for CM DC needs.      Escalations Completed: None    Medically Clear: Yes    Barriers to Discharge: None    Patient discharged home with family.  No CM needs at this time.

## 2023-02-06 NOTE — DIETARY
Nutrition Services Brief Update:    Day 3 of admit.  Laverne Riojas is a 62 y.o. female with admitting DX of Postural dizziness with presyncope    Current Diet: Regular, Low Fat, Lactose Free    Problem: Nutritional:  Goal: Achieve adequate nutritional intake  Description: Patient will consume 50% of meals once diet advanced past NPO/Clear liquids  Outcome: Progressing  Diet advanced, PO >50% for all meals thus far  RD will continue to monitor for consistent PO intake.

## 2023-02-06 NOTE — CARE PLAN
The patient is Stable - Low risk of patient condition declining or worsening    Shift Goals  Clinical Goals: monitor for gi discomfort  Patient Goals: rest  Family Goals: DANICA    Progress made toward(s) clinical / shift goals:  Patient had no reports of gi discomfort.  Patient appeared to be able to rest most of the shift.  Patient did report lower back pain and was successfully treated with Tylenol and heat packs.       Problem: Bowel Elimination  Goal: Establish and maintain regular bowel function  Outcome: Progressing  Note: No reported BM this shift     Problem: Gastrointestinal Irritability  Goal: Nausea and vomiting will be absent or improve  Outcome: Progressing  Note: No reports of nausea or vomiting.

## 2023-02-06 NOTE — DISCHARGE INSTRUCTIONS
Please start taking your antibiotics (ciprofloxacin) starting tonight (2/6/2023).  Please take all the medicines until completion.  Please make sure to follow-up with a gastroenterologist in about 1 month for a colonoscopy.  [facility]: [phone] or [facility]: [phone]

## 2023-02-07 DIAGNOSIS — Z12.11 SCREENING FOR COLON CANCER: ICD-10-CM

## 2023-02-07 DIAGNOSIS — K57.92 DIVERTICULITIS: ICD-10-CM

## 2023-02-07 LAB
BACTERIA BLD CULT: NORMAL
BACTERIA BLD CULT: NORMAL
SIGNIFICANT IND 70042: NORMAL
SIGNIFICANT IND 70042: NORMAL
SITE SITE: NORMAL
SITE SITE: NORMAL
SOURCE SOURCE: NORMAL
SOURCE SOURCE: NORMAL

## 2023-02-12 ENCOUNTER — PATIENT MESSAGE (OUTPATIENT)
Dept: INTERNAL MEDICINE | Facility: OTHER | Age: 63
End: 2023-02-12
Payer: COMMERCIAL

## 2023-02-13 NOTE — PATIENT COMMUNICATION
Spoke to patient doing better today refused to go to Urgent care today. We don't have opeinngs in resident clinic today. Patient will call if she needs to come in sooner otherwise will keep appointment for 02/22/2023 with Dr De Oliveira.

## 2023-02-16 ENCOUNTER — TELEPHONE (OUTPATIENT)
Dept: INTERNAL MEDICINE | Facility: OTHER | Age: 63
End: 2023-02-16
Payer: COMMERCIAL

## 2023-02-16 NOTE — TELEPHONE ENCOUNTER
----- Message from Jyoti Mathew sent at 2/16/2023 12:00 AM PST -----  Regarding: FW: Customer Service: Referrals  Contact: [phone]    ----- Message -----  From: Laverne Riojas  Sent: 2/15/2023   3:38 PM PST  To: [facility] Customer Service  Subject: Customer Service: Referrals                      Customer Service request regarding: Referrals   Regarding coverage: [facility] / [facility]ate Plan   Regarding member: Laverne Riojas    Referral #: [id number]    Comment:  I need my physical therapy referral reopened please

## 2023-02-22 ENCOUNTER — OFFICE VISIT (OUTPATIENT)
Dept: INTERNAL MEDICINE | Facility: OTHER | Age: 63
End: 2023-02-22
Payer: COMMERCIAL

## 2023-02-22 VITALS
SYSTOLIC BLOOD PRESSURE: 127 MMHG | HEART RATE: 74 BPM | TEMPERATURE: 97.4 F | BODY MASS INDEX: 38.49 KG/M2 | DIASTOLIC BLOOD PRESSURE: 84 MMHG | WEIGHT: 231 LBS | HEIGHT: 65 IN | OXYGEN SATURATION: 98 %

## 2023-02-22 DIAGNOSIS — Z02.9 ENCOUNTERS FOR ADMINISTRATIVE PURPOSE: ICD-10-CM

## 2023-02-22 DIAGNOSIS — K92.2 GASTROINTESTINAL HEMORRHAGE, UNSPECIFIED GASTROINTESTINAL HEMORRHAGE TYPE: ICD-10-CM

## 2023-02-22 DIAGNOSIS — M54.9 CHRONIC BILATERAL BACK PAIN, UNSPECIFIED BACK LOCATION: ICD-10-CM

## 2023-02-22 DIAGNOSIS — K57.92 DIVERTICULITIS: ICD-10-CM

## 2023-02-22 DIAGNOSIS — G89.29 CHRONIC BILATERAL BACK PAIN, UNSPECIFIED BACK LOCATION: ICD-10-CM

## 2023-02-22 DIAGNOSIS — M25.551 HIP PAIN, RIGHT: ICD-10-CM

## 2023-02-22 DIAGNOSIS — E66.9 OBESITY (BMI 30-39.9): ICD-10-CM

## 2023-02-22 DIAGNOSIS — E78.00 HYPERCHOLESTEREMIA: ICD-10-CM

## 2023-02-22 PROCEDURE — 99214 OFFICE O/P EST MOD 30 MIN: CPT | Mod: GC | Performed by: STUDENT IN AN ORGANIZED HEALTH CARE EDUCATION/TRAINING PROGRAM

## 2023-02-22 ASSESSMENT — FIBROSIS 4 INDEX: FIB4 SCORE: 1.42

## 2023-02-22 ASSESSMENT — PATIENT HEALTH QUESTIONNAIRE - PHQ9: CLINICAL INTERPRETATION OF PHQ2 SCORE: 0

## 2023-02-22 NOTE — PROGRESS NOTES
Laverne Riojas is a 61 y.o. female here for [facility] Follow-up and Paperwork (Patient here for paperwork and hospital follow up)    HPI:     61 y.o F w/ pmhx of chronic back pain, marijuana use here for post hospital follow up and request to complete paperwork      Patient presented to ED on 2/1 due to dizziness, abdominal cramping and palpitations, had episode of blood BM while in ED. She continued to have bloody BM throughout hospital stay. She was treated initially with IVF, PPI. Rocephin and Flagyl added for concerns for diverticulitis. Patient underwent EGD om 2/2 without source of upper GI bleed. CTA abd/pelvis on 2/4 shows signs of distal colitis vs diverticulitis. She was discharged on ciprofloxacin to complete 10 days of abx. GI recommends follow up in 2 months for colonoscopy.     Patient reports that her abdominal pain, dizziness and bloody has resolved. Reports that she is having regular bowel movement although does have to strain.   Also reports throwing out her back while having a BM several days ago. She has made appointment with PT for next Wednesday. Patient requesting MRI for evaluation of back pain     Patient is requesting paper work file for unemployment since she has been unable to work since her admission and continues to suffer back pain. She is also requesting [facility] paperwork to be completed.     Current medicines (including changes today)  Current Outpatient Medications   Medication Sig Dispense Refill    omeprazole (PRILOSEC) 20 MG delayed-release capsule Take 1 Capsule by mouth 2 times a day for 30 days. 60 Capsule 0    rosuvastatin (CRESTOR) 20 MG Tab Take 1 Tablet by mouth every evening. 30 Tablet 11    therapeutic multivitamin-minerals (THERAGRAN-M) Tab Take 1 Tablet by mouth every day.      Dextromethorphan-guaiFENesin (TUSSIN DM)  MG/5ML Syrup Take 5 mL by mouth every 6 hours as needed (cough). 118 mL 0    cyclobenzaprine (FLEXERIL) 5 mg tablet  "Take 1 Tablet by mouth 3 times a day as needed for Muscle Spasms. 30 Tablet 0    albuterol 108 (90 Base) MCG/ACT Aero Soln inhalation aerosol Inhale 2 Puffs every four hours as needed for Shortness of Breath. 1 Each 0    zinc sulfate (ZINCATE) 220 (50 Zn) MG Cap Take 220 mg by mouth every day.      vitamin D3 (CHOLECALCIFEROL) 1000 Unit (25 mcg) Tab Take 2,000 Units by mouth every day.      Omega-3 Fatty Acids (FISH OIL) 1000 MG Cap capsule Take 1,000 mg by mouth 2 times a day.       No current facility-administered medications for this visit.     She  has a past medical history of Depression and Obesity.  She  has a past surgical history that includes tonsillectomy and pr upper gi endoscopy,diagnosis (N/A, 2023).  Social History     Tobacco Use    Smoking status: Former     Packs/day: 0.25     Years: 25.00     Pack years: 6.25     Types: Cigarettes     Quit date: 2002     Years since quittin.6    Smokeless tobacco: Never   Vaping Use    Vaping Use: Never used   Substance Use Topics    Alcohol use: Yes     Comment: occ    Drug use: Yes     Types: Marijuana     Comment: smoking and edibles daily for pain     Social History     Social History Narrative    Not on file     Family History   Problem Relation Age of Onset    Arthritis Mother     Stroke Mother     Sleep Apnea Mother     Sleep Apnea Brother     Cancer Maternal Uncle      Family Status   Relation Name Status    Mo  Alive    Fa  Other    Sis  Alive    Bro  Alive    MUnc      Sis  Alive       ROS    The pertinent  ROS findings can be seen in the HPI above.     All other systems reviewed and are negative     Objective:     /84 (BP Location: Left arm, Patient Position: Sitting, BP Cuff Size: Large adult long)   Pulse 74   Temp 36.3 °C (97.4 °F) (Temporal)   Ht 1.638 m (5' 4.5\")   Wt 105 kg (231 lb)   SpO2 98%  Body mass index is 39.04 kg/m².        Physical Exam:    Constitutional: Alert, no distress.  Skin: No suspicious " lesions  Eye: Equal, round and reactive, conjunctiva clear, lids normal.  ENMT: Lips without lesions, good dentition, oropharynx clear. Mallampati 2  Neck: Trachea midline, no masses, no thyromegaly. No cervical or supraclavicular lymphadenopathy.  Respiratory: Unlabored respiratory effort, lungs clear to auscultation, no wheezes, no ronchi.  Cardiovascular: Normal S1, S2, no murmur, no edema  Abdomen: Soft, non-tender, no masses, no hepatosplenomegaly.  Musculoskeletal: Adequately aligned spine. ROM intact spine and extremities. No joint erythema or tenderness. Normal muscular development. Normal gait. Straight leg test negative, GIDEON test negative, tenderness to palpation to the lumbar paraspinal m.  Tenderness of the right dorsal midfoot, full ROM, DP pulse 2+ bilaterally  Neuro: CN II-XII intact. Reflexes 2+ throughout. Cerebellar testing normal. Normal and equal strength in all extremities  Psych: Mood stable, affect appropriate, thoughts and speech appropriate    Assessment and Plan:   Diverticulitis  - presented to ED with abdominal pain and GI bleed, presumed diverticulitis. Treated with IVF and Abx   - reports previous hx of coloscopy showing diverticulosis, no records on file   - referral to GI sent for colonoscopy in 8 weeks, patient to schedule appointment   - recommend daily metamucil to improve BMs and avoid straining      GI bleed  - presumed secondary to diverticulitis  - resolved. H/H stable and in normal range during hospitalization   - continue to monitor symptoms. Avoid NSAIDs for now. Recommend Tylenol, topical voltaren gel  as needed for pain   - see A&P for diverticulitis    Hypercholesteremia  - lipid panel done during admission with mixed hyperlipidemia   - started on Crestor 20mg daily   - recheck lipid panel in 6 weeks for drug monitoring   - check A1C at next visit for further risk stratification and considering decreasing dose    Chronic bilateral back pain  - discussed Xray result with  patient, discussed indications for MRI. No alarming signs for urgent Mri at this time   - Recommend proceeding with physical therapy. Can continue with flexeril, tylenol, topical voltaren gel as needed for pain   - patient is interested in steroid injections for back and hip pain, referral to PMR sent   - Follow up in 6-8 weeks    Encounters for administrative purpose  - will complete FMLA and unemployment paperwork for duration of hospitalization to PT treatment, will need repeat evaluation for extension of leave     Obesity (BMI 30-39.9)  - working on diet and lifestyle changes for weight loss  - she is interested in bariatric surgery, referral for surgeon sent      Instructed to Follow up in clinic or ER for worsening symptoms, difficulty breathing, lack of expected recovery, or should new symptoms or problems arise.    Followup: Return in about 6 weeks (around 4/5/2023).

## 2023-02-23 PROBLEM — G89.29 CHRONIC BILATERAL BACK PAIN: Status: ACTIVE | Noted: 2023-02-23

## 2023-02-23 PROBLEM — Z02.9 ENCOUNTERS FOR ADMINISTRATIVE PURPOSE: Status: ACTIVE | Noted: 2023-02-23

## 2023-02-23 PROBLEM — M54.9 CHRONIC BILATERAL BACK PAIN: Status: ACTIVE | Noted: 2023-02-23

## 2023-02-23 PROBLEM — K57.92 DIVERTICULITIS: Status: ACTIVE | Noted: 2023-02-23

## 2023-02-24 ENCOUNTER — TELEPHONE (OUTPATIENT)
Dept: INTERNAL MEDICINE | Facility: OTHER | Age: 63
End: 2023-02-24
Payer: COMMERCIAL

## 2023-02-24 NOTE — ASSESSMENT & PLAN NOTE
- working on diet and lifestyle changes for weight loss  - she is interested in bariatric surgery, referral for surgeon sent

## 2023-02-24 NOTE — ASSESSMENT & PLAN NOTE
- presented to ED with abdominal pain and GI bleed, presumed diverticulitis. Treated with IVF and Abx   - reports previous hx of coloscopy showing diverticulosis, no records on file   - referral to GI sent for colonoscopy in 8 weeks, patient to schedule appointment   - recommend daily metamucil to improve BMs and avoid straining

## 2023-02-24 NOTE — ASSESSMENT & PLAN NOTE
- discussed Xray result with patient, discussed indications for MRI. No alarming signs for urgent Mri at this time   - Recommend proceeding with physical therapy. Can continue with flexeril, tylenol, topical voltaren gel as needed for pain   - patient is interested in steroid injections for back and hip pain, referral to PMR sent   - Follow up in 6-8 weeks

## 2023-02-24 NOTE — ASSESSMENT & PLAN NOTE
- presumed secondary to diverticulitis  - resolved. H/H stable and in normal range during hospitalization   - continue to monitor symptoms. Avoid NSAIDs for now. Recommend Tylenol, topical voltaren gel  as needed for pain   - see A&P for diverticulitis

## 2023-02-24 NOTE — ASSESSMENT & PLAN NOTE
- will complete FMLA and unemployment paperwork for duration of hospitalization to PT treatment, will need repeat evaluation for extension of leave

## 2023-02-24 NOTE — ASSESSMENT & PLAN NOTE
- lipid panel done during admission with mixed hyperlipidemia   - started on Crestor 20mg daily   - recheck lipid panel in 6 weeks for drug monitoring   - check A1C at next visit for further risk stratification and considering decreasing dose

## 2023-03-07 RX ORDER — CYCLOBENZAPRINE HCL 5 MG
5 TABLET ORAL 3 TIMES DAILY PRN
Qty: 30 TABLET | Refills: 0 | Status: SHIPPED | OUTPATIENT
Start: 2023-03-07 | End: 2023-03-14 | Stop reason: SDUPTHER

## 2023-03-13 ENCOUNTER — OFFICE VISIT (OUTPATIENT)
Dept: PHYSICAL MEDICINE AND REHAB | Facility: MEDICAL CENTER | Age: 63
End: 2023-03-13
Payer: COMMERCIAL

## 2023-03-13 VITALS
HEIGHT: 65 IN | BODY MASS INDEX: 39.23 KG/M2 | DIASTOLIC BLOOD PRESSURE: 82 MMHG | WEIGHT: 235.45 LBS | OXYGEN SATURATION: 97 % | TEMPERATURE: 97.1 F | HEART RATE: 85 BPM | SYSTOLIC BLOOD PRESSURE: 126 MMHG

## 2023-03-13 DIAGNOSIS — Z13.31 POSITIVE DEPRESSION SCREENING: ICD-10-CM

## 2023-03-13 DIAGNOSIS — G89.29 CHRONIC BILATERAL LOW BACK PAIN WITHOUT SCIATICA: ICD-10-CM

## 2023-03-13 DIAGNOSIS — M54.50 CHRONIC BILATERAL LOW BACK PAIN WITHOUT SCIATICA: ICD-10-CM

## 2023-03-13 DIAGNOSIS — M53.3 SACROILIAC JOINT DYSFUNCTION OF RIGHT SIDE: ICD-10-CM

## 2023-03-13 DIAGNOSIS — M79.10 MYALGIA: ICD-10-CM

## 2023-03-13 PROCEDURE — 99204 OFFICE O/P NEW MOD 45 MIN: CPT | Performed by: STUDENT IN AN ORGANIZED HEALTH CARE EDUCATION/TRAINING PROGRAM

## 2023-03-13 RX ORDER — METHOCARBAMOL 500 MG/1
500 TABLET, FILM COATED ORAL 4 TIMES DAILY PRN
Qty: 90 TABLET | Refills: 2 | Status: SHIPPED | OUTPATIENT
Start: 2023-03-13 | End: 2023-05-09 | Stop reason: SDUPTHER

## 2023-03-13 ASSESSMENT — PATIENT HEALTH QUESTIONNAIRE - PHQ9
CLINICAL INTERPRETATION OF PHQ2 SCORE: 2
5. POOR APPETITE OR OVEREATING: 2 - MORE THAN HALF THE DAYS
SUM OF ALL RESPONSES TO PHQ QUESTIONS 1-9: 13

## 2023-03-13 ASSESSMENT — FIBROSIS 4 INDEX: FIB4 SCORE: 1.42

## 2023-03-13 ASSESSMENT — PAIN SCALES - GENERAL: PAINLEVEL: 4=SLIGHT-MODERATE PAIN
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
## 2023-03-14 ENCOUNTER — OFFICE VISIT (OUTPATIENT)
Dept: INTERNAL MEDICINE | Facility: OTHER | Age: 63
End: 2023-03-14
Payer: COMMERCIAL

## 2023-03-14 VITALS
TEMPERATURE: 97.2 F | HEART RATE: 81 BPM | OXYGEN SATURATION: 98 % | SYSTOLIC BLOOD PRESSURE: 120 MMHG | DIASTOLIC BLOOD PRESSURE: 86 MMHG | HEIGHT: 65 IN | WEIGHT: 234 LBS | BODY MASS INDEX: 38.99 KG/M2

## 2023-03-14 DIAGNOSIS — K21.9 GASTROESOPHAGEAL REFLUX DISEASE WITHOUT ESOPHAGITIS: ICD-10-CM

## 2023-03-14 DIAGNOSIS — M54.9 CHRONIC BILATERAL BACK PAIN, UNSPECIFIED BACK LOCATION: ICD-10-CM

## 2023-03-14 DIAGNOSIS — G89.29 CHRONIC BILATERAL BACK PAIN, UNSPECIFIED BACK LOCATION: ICD-10-CM

## 2023-03-14 DIAGNOSIS — K57.92 DIVERTICULITIS: ICD-10-CM

## 2023-03-14 PROCEDURE — 99213 OFFICE O/P EST LOW 20 MIN: CPT | Mod: GE

## 2023-03-14 RX ORDER — CYCLOBENZAPRINE HCL 5 MG
5 TABLET ORAL 3 TIMES DAILY PRN
Qty: 30 TABLET | Refills: 0 | Status: SHIPPED
Start: 2023-03-14 | End: 2023-03-14

## 2023-03-14 RX ORDER — OMEPRAZOLE 20 MG/1
20 CAPSULE, DELAYED RELEASE ORAL DAILY
Qty: 30 CAPSULE | Refills: 3 | Status: SHIPPED | OUTPATIENT
Start: 2023-03-14 | End: 2023-03-22 | Stop reason: SDUPTHER

## 2023-03-14 ASSESSMENT — ENCOUNTER SYMPTOMS
FEVER: 0
LOSS OF CONSCIOUSNESS: 0
HEARTBURN: 1
CHILLS: 0
BLOOD IN STOOL: 0
VOMITING: 0
WHEEZING: 0
NAUSEA: 0
COUGH: 0
SHORTNESS OF BREATH: 0
BLURRED VISION: 0
ABDOMINAL PAIN: 0
FALLS: 0
PALPITATIONS: 0

## 2023-03-14 ASSESSMENT — FIBROSIS 4 INDEX: FIB4 SCORE: 1.42

## 2023-03-14 NOTE — PROGRESS NOTES
Established Patient    Patient Care Team:  Annmarie De Oliveira M.D. as PCP - General (Internal Medicine)    Laverne Riojas is a 62 y.o. female who presents today with the following Chief Complaint(s): Follow up for Diagnoses of Chronic bilateral back pain, unspecified back location, Diverticulitis, and Gastroesophageal reflux disease without esophagitis were pertinent to this visit.    HPI:  Patient here for routine follow-up visit.  She is stating that she is still having breakthrough lower back pain, right side worse than left.  She has been performing physical therapy 2 times per week, and is currently on her second week of therapy.  She also recently saw PMR with Dr. Garcia yesterday which they are transitioning her from Flexeril to Robaxin due to sedative effects of Flexeril.  She is otherwise noting gradual improvement in her function/mobility with physical therapy.  She will have to perform 6 weeks of physical therapy prior to being considered for steroid injection.  Patient also has a follow-up appointment with GI on 4/4 regarding recent admission for diverticulitis complicated with GI bleed.  She has not had a bloody bowel movement since her discharge, and is now having regular bowel movements without any straining noted.  Patient also complains of recently having run out of her PPI, omeprazole.  She has been having consistent heartburn, worse at night when lying down flat.    Review of Systems   Constitutional:  Negative for chills and fever.   HENT:  Negative for hearing loss.    Eyes:  Negative for blurred vision.   Respiratory:  Negative for cough, shortness of breath and wheezing.    Cardiovascular:  Negative for chest pain and palpitations.   Gastrointestinal:  Positive for heartburn. Negative for abdominal pain, blood in stool, melena, nausea and vomiting.   Genitourinary:  Negative for dysuria and hematuria.   Musculoskeletal:  Negative for falls.   Neurological:  Negative for loss of  "consciousness.     Past Medical History:   Diagnosis Date    Depression     Obesity      Social History     Tobacco Use    Smoking status: Former     Packs/day: 0.25     Years: 25.00     Pack years: 6.25     Types: Cigarettes     Quit date: 2002     Years since quittin.6    Smokeless tobacco: Never   Vaping Use    Vaping Use: Never used   Substance Use Topics    Alcohol use: Yes     Comment: occ    Drug use: Yes     Types: Marijuana     Comment: smoking and edibles daily for pain     Current Outpatient Medications   Medication Sig Dispense Refill    omeprazole (PRILOSEC) 20 MG delayed-release capsule Take 1 Capsule by mouth every day. 30 Capsule 3    methocarbamol (ROBAXIN) 500 MG Tab Take 1 Tablet by mouth 4 times a day as needed (muscle spasms. don't take within 8 hours of taking flexeril). 90 Tablet 2    Probiotic Product (PROBIOTIC-10 PO) Take  by mouth.      rosuvastatin (CRESTOR) 20 MG Tab Take 1 Tablet by mouth every evening. 30 Tablet 11    albuterol 108 (90 Base) MCG/ACT Aero Soln inhalation aerosol Inhale 2 Puffs every four hours as needed for Shortness of Breath. 1 Each 0    vitamin D3 (CHOLECALCIFEROL) 1000 Unit (25 mcg) Tab Take 2,000 Units by mouth every day.       No current facility-administered medications for this visit.       /86 (BP Location: Left arm, Patient Position: Sitting, BP Cuff Size: Adult)   Pulse 81   Temp 36.2 °C (97.2 °F) (Temporal)   Ht 1.651 m (5' 5\")   Wt 106 kg (234 lb)   SpO2 98%   BMI 38.94 kg/m²   Physical Exam  Constitutional:       Appearance: She is obese.   HENT:      Head: Normocephalic and atraumatic.   Cardiovascular:      Rate and Rhythm: Normal rate and regular rhythm.      Pulses: Normal pulses.      Heart sounds: No murmur heard.    No friction rub. No gallop.   Pulmonary:      Effort: Pulmonary effort is normal.      Breath sounds: Normal breath sounds. No wheezing, rhonchi or rales.   Abdominal:      General: Abdomen is flat. Bowel sounds are " normal. There is no distension.      Palpations: Abdomen is soft.      Tenderness: There is no abdominal tenderness. There is no guarding or rebound.   Musculoskeletal:      Cervical back: No tenderness or bony tenderness.      Thoracic back: No tenderness or bony tenderness.      Lumbar back: Tenderness (R>L) present. No bony tenderness.      Right lower leg: No edema.      Left lower leg: No edema.   Skin:     General: Skin is warm.   Neurological:      General: No focal deficit present.      Mental Status: She is alert and oriented to person, place, and time.   Psychiatric:         Mood and Affect: Mood normal.         Behavior: Behavior normal.       Assessment and Plan:     Chronic bilateral back pain  Patient has a history of chronic bilateral back pain, right worse than left secondary to sacroiliac and hip OA.  Following with PMR and currently involved in physical therapy twice a week (on second week).  Has noted improvement in function and mobility, however still having breakthrough back pain.  -Advised to  prescription of Robaxin provided by Dr. Garcia  -Also encouraged to take OTC Tylenol as needed alongside Robaxin for alleviation of pain  -Continue physical therapy, will need 6 weeks of therapy before being considered for steroid injection by PMR    Gastroesophageal reflux disease without esophagitis  She has a history of GERD for past 5 to 6 years.  Recently ran out of omeprazole, and has had worsening of reflux, especially at night/lying flat  -Reordered omeprazole 20 mg daily  -Advised to obtain wedge pillow to help alleviate symptoms at night/while sleeping  -Reevaluate next visit    Diverticulitis  Patient recently hospitalized on 2/2023 for suspected diverticulitis complicated with GI bleed.  EGD did not demonstrate signs of active bleeding, and patient was discharged in stable condition with resolution of bloody stools  -Has scheduled follow-up visit with GI on 4/4 for possible colonoscopy,  appreciate support  -Currently having regular bowel movements without signs of straining, however encouraged to continue Metamucil use (patient has not been taking)      Orders Placed This Encounter    omeprazole (PRILOSEC) 20 MG delayed-release capsule    DISCONTD: cyclobenzaprine (FLEXERIL) 5 mg tablet       Return in about 8 weeks (around 5/9/2023).    Sukhdeep Arias D.O. PGY I  [facility]

## 2023-03-14 NOTE — ASSESSMENT & PLAN NOTE
Patient has a history of chronic bilateral back pain, right worse than left secondary to sacroiliac and hip OA.  Following with PMR and currently involved in physical therapy twice a week (on second week).  Has noted improvement in function and mobility, however still having breakthrough back pain.  -Advised to  prescription of Robaxin provided by Dr. Garcia  -Also encouraged to take OTC Tylenol as needed alongside Robaxin for alleviation of pain  -Continue physical therapy, will need 6 weeks of therapy before being considered for steroid injection by PMR

## 2023-03-14 NOTE — ASSESSMENT & PLAN NOTE
Patient recently hospitalized on 2/2023 for suspected diverticulitis complicated with GI bleed.  EGD did not demonstrate signs of active bleeding, and patient was discharged in stable condition with resolution of bloody stools  -Has scheduled follow-up visit with GI on 4/4 for possible colonoscopy, appreciate support  -Currently having regular bowel movements without signs of straining, however encouraged to continue Metamucil use (patient has not been taking)

## 2023-03-14 NOTE — ASSESSMENT & PLAN NOTE
She has a history of GERD for past 5 to 6 years.  Recently ran out of omeprazole, and has had worsening of reflux, especially at night/lying flat  -Reordered omeprazole 20 mg daily  -Advised to obtain wedge pillow to help alleviate symptoms at night/while sleeping  -Reevaluate next visit

## 2023-03-22 DIAGNOSIS — K21.9 GASTROESOPHAGEAL REFLUX DISEASE WITHOUT ESOPHAGITIS: ICD-10-CM

## 2023-03-22 NOTE — TELEPHONE ENCOUNTER
Received request via: Pharmacy PATIENT CHANGE PHARMACY PLEASE RESUMMIT RX.    Was the patient seen in the last year in this department? Yes    Does the patient have an active prescription (recently filled or refills available) for medication(s) requested?  yes    Does the patient have FDC Plus and need 100 day supply (blood pressure, diabetes and cholesterol meds only)? Yes, quantity updated to 100 days

## 2023-03-27 RX ORDER — OMEPRAZOLE 20 MG/1
20 CAPSULE, DELAYED RELEASE ORAL DAILY
Qty: 30 CAPSULE | Refills: 3 | Status: SHIPPED | OUTPATIENT
Start: 2023-03-27 | End: 2023-05-09 | Stop reason: SDUPTHER

## 2023-04-19 ENCOUNTER — OFFICE VISIT (OUTPATIENT)
Dept: PHYSICAL MEDICINE AND REHAB | Facility: MEDICAL CENTER | Age: 63
End: 2023-04-19
Payer: COMMERCIAL

## 2023-04-19 VITALS
WEIGHT: 235 LBS | OXYGEN SATURATION: 96 % | TEMPERATURE: 97 F | BODY MASS INDEX: 39.15 KG/M2 | HEIGHT: 65 IN | RESPIRATION RATE: 16 BRPM | DIASTOLIC BLOOD PRESSURE: 74 MMHG | SYSTOLIC BLOOD PRESSURE: 122 MMHG | HEART RATE: 70 BPM

## 2023-04-19 DIAGNOSIS — G89.29 CHRONIC PAIN OF RIGHT ANKLE: ICD-10-CM

## 2023-04-19 DIAGNOSIS — M79.10 MYALGIA: ICD-10-CM

## 2023-04-19 DIAGNOSIS — M79.672 CHRONIC HEEL PAIN, LEFT: ICD-10-CM

## 2023-04-19 DIAGNOSIS — G89.29 CHRONIC HEEL PAIN, LEFT: ICD-10-CM

## 2023-04-19 DIAGNOSIS — M25.571 CHRONIC PAIN OF RIGHT ANKLE: ICD-10-CM

## 2023-04-19 DIAGNOSIS — M53.3 SACROILIAC JOINT DYSFUNCTION OF RIGHT SIDE: Primary | ICD-10-CM

## 2023-04-19 DIAGNOSIS — G89.29 CHRONIC BILATERAL LOW BACK PAIN WITHOUT SCIATICA: ICD-10-CM

## 2023-04-19 DIAGNOSIS — M54.50 CHRONIC BILATERAL LOW BACK PAIN WITHOUT SCIATICA: ICD-10-CM

## 2023-04-19 PROCEDURE — 99213 OFFICE O/P EST LOW 20 MIN: CPT | Performed by: STUDENT IN AN ORGANIZED HEALTH CARE EDUCATION/TRAINING PROGRAM

## 2023-04-19 ASSESSMENT — FIBROSIS 4 INDEX: FIB4 SCORE: 1.42

## 2023-04-19 NOTE — PATIENT INSTRUCTIONS
Your procedure will be at the [facility] special procedure suite.    [facility] [place] [address]       PRE-PROCEDURE INSTRUCTIONS  You may take your regular medications except:   No Anti-inflammatories 5 days prior to your procedure. Anti-inflammatories include medicines such as  ibuprofen (Motrin, Advil), Excedrin, Naproxen (Aleve, Anaprox, Naprelan, Naprosyn), Celecoxib (Celebrex), Diclofenac (Voltaren-XR tab), and Meloxicam (Mobic).   You can take the remainder of your pain medications as prescribed.   If you are having a diagnostic procedure such as a medial branch block, do not use her pain meds on the day of the procedure  No Glucophage or Metformin 24 hours before your procedure. You may resume next day after your procedure.  Call the physiatry office if you are taking or prescribed anti-biotics within five days of procedure.  Please ask provider if you are taking any new diabetes medication.  CONTINUE TAKING BLOOD PRESSURE MEDICATIONS AS PRESCRIBED.  Pain medications will not be prescribed on the procedure day. Procedural pain medication may be used by your provider   Call your doctor's office performing the procedure if you have a fever, chills, rash or new illness prior to your procedure    Anticoagulation/antiplatelet medications  No Blood thinning medications such as Coumadin, Xarelto, aspirin or Plavix 5 days prior to procedure unless your doctor said to continue these medications. Call your doctor if a new medication is prescribed in this class.     Restrictions for eating before procedure:   If you are getting procedural sedation, then do not eat to for 8 hours prior to procedure appointment time. Do not drink fluids for four hours prior to your procedure time.   If you are not having procedural sedation, then Skip the meal prior to your procedure. If you have a morning procedure then skip breakfast. If you have an afternoon procedure then skip lunch.   You may drink clear liquids  up to 2 hours prior to your procedure  You must have a  the day of procedure to accompany you home.      POST PROCEDURE INSTRUCTIONS   No heavy lifting, strenuous bending or strenuous exercise for 3 days after your procedure.  No hot tubs, baths, swimming for 3 days after your procedure  You can remove the bandage the day after the procedure.  IF YOU RECEIVED A STEROID INJECTION. PLEASE NOTE THAT THERE MAY BE A DELAY FOR THE INJECTION TO START WORKING, THE DELAY MAY BE UP TO TWO WEEKS. IF YOU HAVE DIABETES, PLEASE NOTE THAT YOUR SUGAR LEVELS MAY BE ELEVATED FOR 1-2 DAYS AFTER A STEROID INJECTION.  THE STEROID MAY CAUSE TEMPORARY SYMPTOMS WHICH USUALLY RESOLVE ON THEIR OWN WITHIN 1 TO 2 DAYS INCLUDING FACIAL FLUSHING OR A FEELING OF WARMTH ON THE FACE, TEMPORARY INCREASES IN BLOOD SUGAR, INSOMNIA, INCREASED HUNGER  IF YOU EXPERIENCE PROLONGED WEAKNESS LONGER THAN ONE DAY, BOWEL OR BLADDER INCONTINENCE THEN PLEASE CALL THE PHYSIATRY OFFICE.  Your leg may feel heavy, weak and numb for up to 1-2 days. Be very careful walking.    You may resume normal activities 3 days after procedure.

## 2023-04-19 NOTE — H&P (VIEW-ONLY)
"Follow-up patient Note    Interventional Pain and Spine  Physiatry (Physical Medicine and Rehabilitation)     Patient Name: Laverne Riojas  : 1960  Date of service: 2023    Chief Complaint:   Chief Complaint   Patient presents with    Follow-Up     Myalgia       HISTORY  Please see new patient note by Dr. Garcia for more details.     HPI  Today's visit   Laverne Riojas ( 1960) is a female with The primary encounter diagnosis was Sacroiliac joint dysfunction of right side. Diagnoses of Myalgia, Chronic bilateral low back pain without sciatica, Chronic heel pain, left, and Chronic pain of right ankle were also pertinent to this visit.    Today Laverne notes her worst pain at her right sacroiliac joint area. Going to PT once per week with some improvement in low back pain overall although it still significantly bothers her. Low back area worsens with twisting and lifting heavy things. Went for a walk the other day and her whole right leg \"froze up.\"      Notes some left heel pain that has worsened while she has dealt with R SI area pain.  She notes in the past she felt she had plantar fasciitis in both feet but did not seek evaluation at that time. Also with some pain at her right neck and pain at her right hip flexor. Has to roll out her hip and this pain is not going away.       Taking robaxin which helps about the same as flexeril.  She notes neither has significant unwanted side effects at this time.     Pain severity 3-4/10 currently  Pt denies new numbness, tingling, or weakness.      ROS:   Red Flags ROS:   Fever, Chills, Sweats: Denies  Involuntary Weight Loss: Denies  Bladder Incontinence: Denies  Bowel Incontinence: denies  Saddle Anesthesia: Denies    All other systems reviewed and negative.     PMHx:   Past Medical History:   Diagnosis Date    Depression     Obesity        PSHx:   Past Surgical History:   Procedure Laterality Date    FL UPPER GI ENDOSCOPY,DIAGNOSIS N/A " 2023    Procedure: GASTROSCOPY;  Surgeon: Debbie Taylor M.D.;  Location: SURGERY SAME DAY [facility];  Service: Gastroenterology    TONSILLECTOMY         Family Hx:   Family History   Problem Relation Age of Onset    Arthritis Mother     Stroke Mother     Sleep Apnea Mother     Sleep Apnea Brother     Cancer Maternal Uncle        Social Hx:  Social History     Socioeconomic History    Marital status: Single     Spouse name: Not on file    Number of children: Not on file    Years of education: Not on file    Highest education level: Not on file   Occupational History    Not on file   Tobacco Use    Smoking status: Former     Packs/day: 0.25     Years: 25.00     Pack years: 6.25     Types: Cigarettes     Quit date: 2002     Years since quittin.7    Smokeless tobacco: Never   Vaping Use    Vaping Use: Never used   Substance and Sexual Activity    Alcohol use: Yes     Comment: occ    Drug use: Yes     Types: Marijuana     Comment: smoking and edibles daily for pain    Sexual activity: Not Currently     Partners: Male     Birth control/protection: Post-Menopausal   Other Topics Concern    Not on file   Social History Narrative    Not on file     Social Determinants of Health     Financial Resource Strain: Not on file   Food Insecurity: Not on file   Transportation Needs: Not on file   Physical Activity: Not on file   Stress: Not on file   Social Connections: Not on file   Intimate Partner Violence: Not on file   Housing Stability: Not on file       Allergies:  No Known Allergies    Medications: reviewed on epic.   Outpatient Medications Marked as Taking for the 23 encounter (Office Visit) with Hoa Garcia M.D.   Medication Sig Dispense Refill    omeprazole (PRILOSEC) 20 MG delayed-release capsule Take 1 Capsule by mouth every day. 30 Capsule 3    methocarbamol (ROBAXIN) 500 MG Tab Take 1 Tablet by mouth 4 times a day as needed (muscle spasms. don't take within 8 hours of taking flexeril). 90 Tablet  "2    Probiotic Product (PROBIOTIC-10 PO) Take  by mouth.      rosuvastatin (CRESTOR) 20 MG Tab Take 1 Tablet by mouth every evening. 30 Tablet 11    albuterol 108 (90 Base) MCG/ACT Aero Soln inhalation aerosol Inhale 2 Puffs every four hours as needed for Shortness of Breath. 1 Each 0    vitamin D3 (CHOLECALCIFEROL) 1000 Unit (25 mcg) Tab Take 2,000 Units by mouth every day.          Current Outpatient Medications on File Prior to Visit   Medication Sig Dispense Refill    omeprazole (PRILOSEC) 20 MG delayed-release capsule Take 1 Capsule by mouth every day. 30 Capsule 3    methocarbamol (ROBAXIN) 500 MG Tab Take 1 Tablet by mouth 4 times a day as needed (muscle spasms. don't take within 8 hours of taking flexeril). 90 Tablet 2    Probiotic Product (PROBIOTIC-10 PO) Take  by mouth.      rosuvastatin (CRESTOR) 20 MG Tab Take 1 Tablet by mouth every evening. 30 Tablet 11    albuterol 108 (90 Base) MCG/ACT Aero Soln inhalation aerosol Inhale 2 Puffs every four hours as needed for Shortness of Breath. 1 Each 0    vitamin D3 (CHOLECALCIFEROL) 1000 Unit (25 mcg) Tab Take 2,000 Units by mouth every day.       No current facility-administered medications on file prior to visit.         EXAMINATION     Physical Exam:   /74 (BP Location: Right arm, Patient Position: Sitting, BP Cuff Size: Adult)   Pulse 70   Temp 36.1 °C (97 °F) (Temporal)   Resp 16   Ht 1.651 m (5' 5\")   Wt 107 kg (235 lb)   SpO2 96%     Constitutional:   Body Habitus: Body mass index is 39.11 kg/m².  Cooperation: Fully cooperates with exam  Appearance: Well-groomed, well-nourished.    Eyes: No scleral icterus to suggest severe liver disease, no proptosis to suggest severe hyperthyroidism    ENT -no obvious auditory deficits, no noticeable facial droop     Skin -no rashes or lesions noted     Respiratory-  breathing comfortably on room air, no audible wheezing    Cardiovascular-distal extremities warm and well perfused.  No lower extremity edema " is noted.     Gastrointestinal - no obvious abdominal masses, non-distended    Psychiatric- alert and oriented ×3. Normal affect.     Gait - normal gait, no use of ambulatory device, nonantalgic. Heel walking and toe walking intact.     Musculoskeletal and Neuro -      Increased myofascial tension at bilateral thoracic paraspinal muscles and bilateral glutes.       Thoracic/Lumbar Spine/Sacral Spine/Hips   Inspection: No evidence of atrophy in bilateral lower extremities throughout      There is limited active range of motion with lumbar extension     Facet loading maneuver negative bilaterally     Palpation:   Tenderness to palpation over the sacroiliac joint on right. No tenderness to palpation elsewhere in the low back/hips including midline of lumbosacral spine, paraspinal muscles bilaterally, lumbar facets bilaterally spanning approximately L1-L5 levels, sacroiliac joint on left, PSIS bilaterally, and greater trochanters bilaterally.     Lumbar spine /hip provocative exam maneuvers  Straight leg raise negative bilaterally  FADIR test negative bilaterally     SI joint tests  GIDEON test positive on right, negative on left  Thigh thrust test positive on right, negative on left  SI joint compression negative bilaterally  SI joint distraction negative bilaterally  Sacral thrust test negative bilaterally  Yeomans maneuver positive on right, negative on left  Eliud finger sign positive on right, negative on left        Key points for the international standards for neurological classification of spinal cord injury (ISNCSCI) to light touch.   Dermatome R L   L2 2 2   L3 2 2   L4 2 2   L5 2 2   S1 2 2   S2 2 2         Motor Exam Lower Extremities  ? Myotome R L   Hip flexion L2 5 5   Knee extension L3 5 5   Ankle dorsiflexion L4 5 5   Toe extension L5 5 5   Ankle plantarflexion S1 5 5      Previous exam  Reflexes  ?   R L   Patella   2+ 2+   Achilles    2+ 2+      Clonus of the ankle negative bilaterally           MEDICAL DECISION MAKING    Medical records review: see under HPI section.     DATA    Labs: No new labs available for review since last visit.   Lab Results   Component Value Date/Time    SODIUM 140 02/06/2023 02:03 AM    POTASSIUM 4.1 02/06/2023 02:03 AM    CHLORIDE 106 02/06/2023 02:03 AM    CO2 24 02/06/2023 02:03 AM    ANION 10.0 02/05/2023 09:00 AM    GLUCOSE 100 (H) 02/06/2023 02:03 AM    BUN 11 02/06/2023 02:03 AM    CREATININE 0.83 02/06/2023 02:03 AM    CALCIUM 8.8 02/06/2023 02:03 AM    ASTSGOT 21 02/05/2023 09:00 AM    ALTSGPT 13 02/05/2023 09:00 AM    TBILIRUBIN 0.4 02/05/2023 09:00 AM    ALBUMIN 3.6 02/06/2023 02:03 AM    TOTPROTEIN 6.1 02/05/2023 09:00 AM    GLOBULIN 2.4 02/05/2023 09:00 AM    AGRATIO 1.5 02/05/2023 09:00 AM       Lab Results   Component Value Date/Time    PROTHROMBTM 13.3 02/02/2023 04:47 AM    INR 1.02 02/02/2023 04:47 AM        Lab Results   Component Value Date/Time    WBC 6.0 02/06/2023 02:03 AM    RBC 4.35 02/06/2023 02:03 AM    HEMOGLOBIN 12.7 02/06/2023 02:03 AM    HEMATOCRIT 38.8 02/06/2023 02:03 AM    MCV 89.2 02/06/2023 02:03 AM    MCH 29.2 02/06/2023 02:03 AM    MCHC 32.7 (L) 02/06/2023 02:03 AM    MPV 10.6 02/06/2023 02:03 AM    NEUTSPOLYS 57.90 02/06/2023 02:03 AM    LYMPHOCYTES 30.70 02/06/2023 02:03 AM    MONOCYTES 7.20 02/06/2023 02:03 AM    EOSINOPHILS 3.50 02/06/2023 02:03 AM    BASOPHILS 0.50 02/06/2023 02:03 AM    HYPOCHROMIA 1+ 02/01/2014 04:25 PM        Lab Results   Component Value Date/Time    HBA1C 5.6 10/26/2015 09:30 AM        Imaging:   I personally reviewed following images, these are my reads  CT chest abdomen pelvis 2/4/2023  Lumbar disc height spaces appear to be well preserved.  Anterior body osteophytes most notable at T12, T11, T10, T9, and T8.  Facet arthropathy most notable at bilateral lower lumbar levels.  See formal radiology report for further details.     IMAGING radiology reads. I reviewed the following radiology reads   CT chest  abdomen pelvis 2023  FINDINGS:     Noncontrast images:  There is no intramural hematoma.     There is calcific atherosclerotic plaque.     Contrast images:     Aorta and vasculature: No abdominal aortic aneurysm or dissection. BILATERAL iliac arteries appear unremarkable.  Celiac artery, superior mesenteric artery and inferior mesenteric artery demonstrate no stenosis or abrupt proximal occlusion. Single BILATERAL renal arteries appear unremarkable     Large RIGHT hepatic hemangioma is redemonstrated and measures up to 7.5 cm on today's study  Spleen is normal in size.     Pancreas is unremarkable.  Gallbladder and biliary tree are normal.     Adrenal glands are normal.  Kidneys enhance symmetrically.     There is distal colonic wall thickening and adjacent fat stranding which is similar to the prior study. There are distal colonic diverticuli. Appendix appears unremarkable. No evidence of bowel obstruction. Moderate-sized hiatal hernia  There is trace free fluid in the pelvis, similar to the prior study           3D angiographic/MIP images of the vasculature confirm the vascular findings as described above.     IMPRESSION:     1.  Unremarkable CTA of the mesenteric vessels  2.  Ongoing distal colitis or diverticulitis  3.  Trace pelvic fluid  4.  7.5 cm liver hemangioma                     Diagnosis  Visit Diagnoses     ICD-10-CM   1. Sacroiliac joint dysfunction of right side  M53.3   2. Myalgia  M79.10   3. Chronic bilateral low back pain without sciatica  M54.50    G89.29   4. Chronic heel pain, left  M79.672    G89.29   5. Chronic pain of right ankle  M25.571    G89.29         ASSESSMENT AND PLAN:  Laverne Riojas (: 1960) is a female with a multiple areas including her right sacroiliac joint, reproduced with 3+ positive provocative exam maneuvers today, suggestive of right sacroiliac joint dysfunction     Laverne was seen today for follow-up.    Diagnoses and all orders for this  visit:    Sacroiliac joint dysfunction of right side  -     Referral to Pain Clinic    Myalgia    Chronic bilateral low back pain without sciatica    Chronic heel pain, left  -     Referral to Podiatry    Chronic pain of right ankle  -     Referral to Podiatry          PLAN  Physical Therapy: Continue physical therapy.  At this time the patient has done approximately 6 weeks of physical therapy still with significant right sacroiliac joint pain that interferes with her ability to perform ADLs.     Diagnostic workup: no new imaging needed at this time     Medications:   - given the myofascial component of pain, continue Robaxin.  Discussed that she should not take this within 8 hours of taking Flexeril.  She would like to try a possibly less sedating alternative to Flexeril so that she can try taking medication during her workday. Counseled on possible side effect of drowsiness and dizziness and discussed that the patient should discontinue this if the side effects are too severe. Counseled patient to initially try taking this medication at bedtime.  -Discussed that she may take tylenol as needed up to 4 grams per day, in divided doses at least 6 hours apart. Do not take more than 1 gram at a time.     Interventions:   - R sacroiliac joint injection. The risks, benefits, and alternatives to this procedure were discussed and the patient wishes to proceed with the procedure. Risks include but are not limited to damage to surrounding structures, infection, bleeding, worsening of pain which can be permanent, and weakness which can be permanent. Benefits include pain relief and improved function. Alternatives include not doing the procedure.      Referrals:  -Referral to podiatry for evaluation and management of appears to be possible left plantar fasciitis and right ankle pain    Follow-up: 1 month after injection    Orders Placed This Encounter    Referral to Pain Clinic    Referral to Podiatry       Hoa Garcia  MD  Interventional Pain and Spine  Physical Medicine and Rehabilitation  [facility]      The above note documents my personal evaluation of this patient. In addition, I have reviewed and confirmed with the patient and MA the supportive information documented in today's Patient Health Questionnaire and Office Note.     Please note that this dictation was created using voice recognition software. I have made every reasonable attempt to correct obvious errors, but I expect that there are errors of grammar and possibly content that I did not discover before finalizing the note.

## 2023-05-02 ENCOUNTER — APPOINTMENT (OUTPATIENT)
Dept: RADIOLOGY | Facility: REHABILITATION | Age: 63
End: 2023-05-02
Attending: STUDENT IN AN ORGANIZED HEALTH CARE EDUCATION/TRAINING PROGRAM
Payer: COMMERCIAL

## 2023-05-02 ENCOUNTER — HOSPITAL ENCOUNTER (OUTPATIENT)
Facility: REHABILITATION | Age: 63
End: 2023-05-02
Attending: STUDENT IN AN ORGANIZED HEALTH CARE EDUCATION/TRAINING PROGRAM | Admitting: STUDENT IN AN ORGANIZED HEALTH CARE EDUCATION/TRAINING PROGRAM
Payer: COMMERCIAL

## 2023-05-02 VITALS
HEIGHT: 65 IN | OXYGEN SATURATION: 99 % | TEMPERATURE: 97.5 F | RESPIRATION RATE: 16 BRPM | SYSTOLIC BLOOD PRESSURE: 111 MMHG | DIASTOLIC BLOOD PRESSURE: 69 MMHG | BODY MASS INDEX: 39.93 KG/M2 | HEART RATE: 76 BPM | WEIGHT: 239.64 LBS

## 2023-05-02 PROCEDURE — 700117 HCHG RX CONTRAST REV CODE 255

## 2023-05-02 PROCEDURE — G0260 INJ FOR SACROILIAC JT ANESTH: HCPCS

## 2023-05-02 PROCEDURE — 700111 HCHG RX REV CODE 636 W/ 250 OVERRIDE (IP)

## 2023-05-02 RX ORDER — DEXAMETHASONE SODIUM PHOSPHATE 10 MG/ML
INJECTION, SOLUTION INTRAMUSCULAR; INTRAVENOUS
Status: COMPLETED
Start: 2023-05-02 | End: 2023-05-02

## 2023-05-02 RX ORDER — LIDOCAINE HYDROCHLORIDE 10 MG/ML
INJECTION, SOLUTION EPIDURAL; INFILTRATION; INTRACAUDAL; PERINEURAL
Status: COMPLETED
Start: 2023-05-02 | End: 2023-05-02

## 2023-05-02 RX ORDER — BUPIVACAINE HYDROCHLORIDE 2.5 MG/ML
INJECTION, SOLUTION EPIDURAL; INFILTRATION; INTRACAUDAL
Status: COMPLETED
Start: 2023-05-02 | End: 2023-05-02

## 2023-05-02 RX ADMIN — BUPIVACAINE HYDROCHLORIDE 2 ML: 2.5 INJECTION, SOLUTION EPIDURAL; INFILTRATION; INTRACAUDAL; PERINEURAL at 13:14

## 2023-05-02 RX ADMIN — IOHEXOL 3 ML: 240 INJECTION, SOLUTION INTRATHECAL; INTRAVASCULAR; INTRAVENOUS; ORAL at 13:13

## 2023-05-02 RX ADMIN — DEXAMETHASONE SODIUM PHOSPHATE 10 MG: 10 INJECTION INTRAMUSCULAR; INTRAVENOUS at 13:14

## 2023-05-02 RX ADMIN — LIDOCAINE HYDROCHLORIDE 10 ML: 10 INJECTION, SOLUTION EPIDURAL; INFILTRATION; INTRACAUDAL; PERINEURAL at 13:12

## 2023-05-02 ASSESSMENT — FIBROSIS 4 INDEX: FIB4 SCORE: 1.42

## 2023-05-02 ASSESSMENT — PAIN DESCRIPTION - PAIN TYPE: TYPE: CHRONIC PAIN

## 2023-05-02 NOTE — PROGRESS NOTES
Admitting assessment completed w 2 identifiers. Pt stated procedure. Medical Hx, meds +allergies reviewed +systems: respiratory,cardiac, anticoagulants+ antibiotic therapy, renal, surgeries, pain, falls, implants,diabetes, hepatitis. Confirmed Pt understanding of DC Instructions. Infection prevention including COVID precautions reviewed. Pt has  waiting.

## 2023-05-02 NOTE — INTERVAL H&P NOTE
H&P reviewed. The patient was examined and there are no changes to the H&P    Hoa Garcia MD  Interventional Pain and Spine  Physical Medicine and Rehabilitation  [facility]

## 2023-05-02 NOTE — PROGRESS NOTES
Discharge instructions reviewed again with reinforcement of infection prevention tips; ie:hand washing, covering cough,site  observation, Social distancing and mask use.   Taking fluids w/o difficulty. Dressing clean dry intact.   DC to  via ambulatory

## 2023-05-02 NOTE — OP REPORT
Date of Service: 5/2/2023    Patient: Laverne Riojas 62 y.o. female     MRN: [id number]     Physician/s: Hoa Garcia MD    Pre-operative Diagnosis: right Sacroiliac dysfunction    Post-operative Diagnosis: right Sacroiliac dysfunction    Procedure: right Sacroiliac joint injection    Description of procedure:    The risks, benefits, and alternatives of the procedure were reviewed and discussed with the patient.  Written informed consent was freely obtained. A pre-procedural time-out was conducted by the physician verifying patient’s identity, procedure to be performed, procedure site and side, and allergy verification. Appropriate equipment was determined to be in place for the procedure.     In the fluoroscopy suite the patient was placed in a prone position and the skin was prepped and draped in the usual sterile fashion. The fluoroscope was placed over the right sacroiliac joint at the appropriate angle for joint entrance, and the target for injection was marked. A 27g needle was placed into each of the marked level(s), and approx 2cc of 1% Lidocaine was injected subcutaneously into the epidermal and dermal layers. The needle was removed. A 25g 3.5 inch needle was then placed down to the level of bone at the inferior/distal aspect of the joint. The needle was then retracted and carefully advanced into the joint capsule. The needle tip was then verified by a lateral view. In the AP view, contrast dye was used to highlight the joint line while the fluoroscope was running live. Following negative aspiration, approx 1mL of 0.25% bupivacaine with 1mL of 10 mg/mL dexamethasone was then injected. The needle was removed intact after being restyleted. The patient's low back was covered with a 4x4 gauze, the area was cleansed with sterile normal saline, and a dressing was applied. There were no complications noted.     Follow-up as scheduled    Hoa Garcia MD  Interventional Pain Management  Physical Medicine and  [facility]  5/2/2023        CPT  sacroiliac joint with fluoroscopy [id number]

## 2023-05-03 ENCOUNTER — TELEPHONE (OUTPATIENT)
Dept: PHYSICAL MEDICINE AND REHAB | Facility: MEDICAL CENTER | Age: 63
End: 2023-05-03
Payer: COMMERCIAL

## 2023-05-03 NOTE — TELEPHONE ENCOUNTER
Called for post-sp check-up. Pt reported the following regarding the procedure site: RIGHT sacroiliac joint injection with fluoroscopic guidance    Change in pain?: ELAINA    Concerns?: LM    Confirmed [facility] appt?: ELAINA, 5/30

## 2023-05-09 ENCOUNTER — OFFICE VISIT (OUTPATIENT)
Dept: INTERNAL MEDICINE | Facility: OTHER | Age: 63
End: 2023-05-09
Payer: COMMERCIAL

## 2023-05-09 VITALS
TEMPERATURE: 97.6 F | DIASTOLIC BLOOD PRESSURE: 78 MMHG | HEART RATE: 75 BPM | SYSTOLIC BLOOD PRESSURE: 122 MMHG | WEIGHT: 234.8 LBS | BODY MASS INDEX: 39.12 KG/M2 | OXYGEN SATURATION: 95 % | HEIGHT: 65 IN

## 2023-05-09 DIAGNOSIS — E78.00 HYPERCHOLESTEREMIA: ICD-10-CM

## 2023-05-09 DIAGNOSIS — M54.9 CHRONIC BILATERAL BACK PAIN, UNSPECIFIED BACK LOCATION: ICD-10-CM

## 2023-05-09 DIAGNOSIS — E66.9 OBESITY (BMI 30-39.9): ICD-10-CM

## 2023-05-09 DIAGNOSIS — K57.92 DIVERTICULITIS: ICD-10-CM

## 2023-05-09 DIAGNOSIS — K21.9 GASTROESOPHAGEAL REFLUX DISEASE WITHOUT ESOPHAGITIS: ICD-10-CM

## 2023-05-09 DIAGNOSIS — R53.82 CHRONIC FATIGUE: ICD-10-CM

## 2023-05-09 DIAGNOSIS — G89.29 CHRONIC BILATERAL BACK PAIN, UNSPECIFIED BACK LOCATION: ICD-10-CM

## 2023-05-09 PROBLEM — R06.2 WHEEZING WITHOUT DIAGNOSIS OF ASTHMA: Status: RESOLVED | Noted: 2022-12-19 | Resolved: 2023-05-09

## 2023-05-09 PROBLEM — K92.2 GI BLEED: Status: RESOLVED | Noted: 2023-02-01 | Resolved: 2023-05-09

## 2023-05-09 PROBLEM — E66.01 MORBID OBESITY WITH BMI OF 40.0-44.9, ADULT (HCC): Status: RESOLVED | Noted: 2017-07-14 | Resolved: 2023-05-09

## 2023-05-09 PROBLEM — R05.1 ACUTE COUGH: Status: RESOLVED | Noted: 2022-12-19 | Resolved: 2023-05-09

## 2023-05-09 PROBLEM — J06.9 VIRAL UPPER RESPIRATORY TRACT INFECTION: Status: RESOLVED | Noted: 2022-12-19 | Resolved: 2023-05-09

## 2023-05-09 PROBLEM — I47.29 NONSUSTAINED VENTRICULAR TACHYCARDIA (HCC): Status: RESOLVED | Noted: 2023-02-01 | Resolved: 2023-05-09

## 2023-05-09 PROBLEM — K52.9 COLITIS: Status: RESOLVED | Noted: 2023-02-03 | Resolved: 2023-05-09

## 2023-05-09 PROBLEM — Z02.9 ENCOUNTERS FOR ADMINISTRATIVE PURPOSE: Status: RESOLVED | Noted: 2023-02-23 | Resolved: 2023-05-09

## 2023-05-09 PROCEDURE — 99213 OFFICE O/P EST LOW 20 MIN: CPT | Mod: GE | Performed by: STUDENT IN AN ORGANIZED HEALTH CARE EDUCATION/TRAINING PROGRAM

## 2023-05-09 RX ORDER — OMEPRAZOLE 20 MG/1
20 CAPSULE, DELAYED RELEASE ORAL DAILY
Qty: 30 CAPSULE | Refills: 3 | Status: SHIPPED | OUTPATIENT
Start: 2023-05-09 | End: 2023-06-06 | Stop reason: SDUPTHER

## 2023-05-09 RX ORDER — METHOCARBAMOL 500 MG/1
500 TABLET, FILM COATED ORAL 4 TIMES DAILY PRN
Qty: 90 TABLET | Refills: 2 | Status: SHIPPED | OUTPATIENT
Start: 2023-05-09

## 2023-05-09 ASSESSMENT — FIBROSIS 4 INDEX: FIB4 SCORE: 1.42

## 2023-05-09 NOTE — PROGRESS NOTES
"Laverne Riojas is a 61 y.o. female here for Back Pain (Patient here for back pain requesting lab orders)    HPI:     61 y.o F w/ pmhx of chronic back pain, diverticulitis, hyperlipidemia and marijuana use here for follow up on colonoscopy and back pain. She is also requesting workup for chronic fatigue and hair loss     Diverticulitis-   Patient presented to ED on 2/1 due to dizziness, abdominal cramping and palpitations, had episode of blood BM while in ED. She continued to have bloody BM throughout hospital stay. She was treated initially with IVF, PPI. Rocephin and Flagyl added for concerns for diverticulitis. Patient underwent EGD om 2/2 without source of upper GI bleed. CTA abd/pelvis on 2/4 shows signs of distal colitis vs diverticulitis. She was discharged on ciprofloxacin to complete 10 days of abx.   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 No records from GI available. No further [place] bleeding, has been taking omeprazole daily    Back pain-   Going to physical therapy, seeing Dr. Garcia and had back injection last week. Reports that it is helpful, able to be more physically active  Insurance does not cover bariatric surgery   Requesting refill of muscle relaxant, flexeril changed to methocarbamol due to sedation by Dr. Garcia     Patient also reports chronic fatique, hair loss and difficulties losing weights. She reports not having her estrogen level checked \"for years\" and would like to have it recheck. Discussed no indication for checking estrogen levels at this time. Discussed broad ddx for chronic fatigue, will get some initial labs for workup and she will need to complete age appropriate cancer screening.     She has not been taking Crestor consistently, states that she is \"bad at taking pills.\"    Current medicines (including changes today)  Current Outpatient Medications   Medication Sig Dispense Refill    omeprazole (PRILOSEC) 20 MG delayed-release capsule " Take 1 Capsule by mouth every day. 30 Capsule 3    methocarbamol (ROBAXIN) 500 MG Tab Take 1 Tablet by mouth 4 times a day as needed (muscle spasms. don't take within 8 hours of taking flexeril). 90 Tablet 2    Probiotic Product (PROBIOTIC-10 PO) Take  by mouth.      albuterol 108 (90 Base) MCG/ACT Aero Soln inhalation aerosol Inhale 2 Puffs every four hours as needed for Shortness of Breath. 1 Each 0    vitamin D3 (CHOLECALCIFEROL) 1000 Unit (25 mcg) Tab Take 2,000 Units by mouth every day.      rosuvastatin (CRESTOR) 20 MG Tab Take 1 Tablet by mouth every evening. (Patient not taking: Reported on 2023) 30 Tablet 11     No current facility-administered medications for this visit.     She  has a past medical history of Depression and Obesity.  She  has a past surgical history that includes tonsillectomy; pr upper gi endoscopy,diagnosis (N/A, 2023); and pr injection,sacroiliac joint (Right, 2023).  Social History     Tobacco Use    Smoking status: Former     Packs/day: 0.25     Years: 25.00     Pack years: 6.25     Types: Cigarettes     Quit date: 2002     Years since quittin.8    Smokeless tobacco: Never   Vaping Use    Vaping Use: Never used   Substance Use Topics    Alcohol use: Yes     Comment: occ    Drug use: Yes     Types: Marijuana     Comment: smoking and edibles daily for pain     Social History     Social History Narrative    Not on file     Family History   Problem Relation Age of Onset    Arthritis Mother     Stroke Mother     Sleep Apnea Mother     Sleep Apnea Brother     Cancer Maternal Uncle      Family Status   Relation Name Status    Mo  Alive    Fa  Other    Sis  Alive    Bro  Alive    MUnc      Sis  Alive       ROS    The pertinent  ROS findings can be seen in the HPI above.     All other systems reviewed and are negative     Objective:     /78 (BP Location: Right arm, Patient Position: Sitting, BP Cuff Size: Large adult)   Pulse 75   Temp 36.4 °C (97.6 °F)  "(Temporal)   Ht 1.651 m (5' 5\")   Wt 107 kg (234 lb 12.8 oz)   SpO2 95%  Body mass index is 39.07 kg/m².        Physical Exam:    Constitutional: Alert, no distress.  Skin: No suspicious lesions  Eye: Equal, round and reactive, conjunctiva clear, lids normal.  ENMT: Lips without lesions, good dentition, oropharynx clear. Mallampati 2  Neck: Trachea midline, no masses, no thyromegaly. No cervical or supraclavicular lymphadenopathy.  Respiratory: Unlabored respiratory effort, lungs clear to auscultation, no wheezes, no ronchi.  Cardiovascular: Normal S1, S2, no murmur, no edema  Abdomen: Soft, non-tender, no masses, no hepatosplenomegaly.  Musculoskeletal: Adequately aligned spine. ROM intact spine and extremities. No joint erythema or tenderness. Normal muscular development. Normal gait. Straight leg test negative, GIDEON test negative, tenderness to palpation to the lumbar paraspinal m.  Tenderness of the right dorsal midfoot, full ROM, DP pulse 2+ bilaterally  Neuro: CN II-XII intact. Reflexes 2+ throughout. Cerebellar testing normal. Normal and equal strength in all extremities  Psych: Mood stable, affect appropriate, thoughts and speech appropriate    Assessment and Plan:   Diverticulitis  Patient recently hospitalized on 2/2023 for suspected diverticulitis complicated with GI bleed.  EGD did not demonstrate signs of active bleeding, and patient was discharged in stable condition with resolution of bloody stools  - completed colonoscopy, will need records   - BM improved, encourage high fiber diets, educational materials given to patient  - follow up as needed    Gastroesophageal reflux disease without esophagitis  She has a history of GERD for past 5 to 6 years.  Recently ran out of omeprazole, and has had worsening of reflux, especially at night/lying flat  -reports Ferguson's esophagus finding on recent EGD. Will need GI records   - discussed lifestyle modifications for prevention. Continue with omeprazole " 20mg daily, repeat EGD pending GI records     Chronic bilateral back pain  - improving   - continue with physical therapy. Encourage continue weight loss   - follow up with PMR as scheduled. Refill methocarbamol     Hypercholesteremia  - lipid panel done during admission with mixed hyperlipidemia, ASCVD risk 3.3%  - check A1C for further risk stratification, if not diabetic, can discontinue and monitor with periodic lipid panels  - encourage patient to continue efforts with weight loss, healthy diet      Chronic fatigue  - chronic, with associated weight loss and difficulties losing weight. With history of MDD although reports mood is good now   - check TSH, vit D. CBC WNL. Will need PAP smear for cervical ca screening  - if unrevealing,  Will send for sleep study to rule out sleep apnea  - patient previously declined to restart pharmacotherapy for MDD, re-discuss if above evaluation unrevealing        Instructed to Follow up in clinic or ER for worsening symptoms, difficulty breathing, lack of expected recovery, or should new symptoms or problems arise.    Followup: Return in about 1 week (around 5/16/2023) for PAP smear.

## 2023-05-09 NOTE — ASSESSMENT & PLAN NOTE
Patient recently hospitalized on 2/2023 for suspected diverticulitis complicated with GI bleed.  EGD did not demonstrate signs of active bleeding, and patient was discharged in stable condition with resolution of bloody stools  - completed colonoscopy, will need records   - BM improved, encourage high fiber diets, educational materials given to patient  - follow up as needed

## 2023-05-09 NOTE — ASSESSMENT & PLAN NOTE
- improving   - continue with physical therapy. Encourage continue weight loss   - follow up with PMR as scheduled. Refill methocarbamol

## 2023-05-09 NOTE — ASSESSMENT & PLAN NOTE
She has a history of GERD for past 5 to 6 years.  Recently ran out of omeprazole, and has had worsening of reflux, especially at night/lying flat  -reports Ferguson's esophagus finding on recent EGD. Will need GI records   - discussed lifestyle modifications for prevention. Continue with omeprazole 20mg daily, repeat EGD pending GI records

## 2023-05-09 NOTE — PATIENT INSTRUCTIONS
- Please request  your GI doctor to forward records for your recent colonoscopy and EGD   - Please schedule an appointment for your PAP smear

## 2023-05-09 NOTE — ASSESSMENT & PLAN NOTE
- lipid panel done during admission with mixed hyperlipidemia, ASCVD risk 3.3%  - check A1C for further risk stratification, if not diabetic, can discontinue and monitor with periodic lipid panels  - encourage patient to continue efforts with weight loss, healthy diet

## 2023-05-09 NOTE — ASSESSMENT & PLAN NOTE
- chronic, with associated weight loss and difficulties losing weight. With history of MDD although reports mood is good now   - check TSH, vit D. CBC WNL. Will need PAP smear for cervical ca screening  - if unrevealing,  Will send for sleep study to rule out sleep apnea  - patient previously declined to restart pharmacotherapy for MDD, re-discuss if above evaluation unrevealing

## 2023-05-19 ENCOUNTER — TELEPHONE (OUTPATIENT)
Dept: INTERNAL MEDICINE | Facility: OTHER | Age: 63
End: 2023-05-19
Payer: COMMERCIAL

## 2023-05-19 NOTE — TELEPHONE ENCOUNTER
Patient lvm to have referral change to [facility] and be fax to [phone]. I also provides patient with [facility] referral line [phone] to back date referral to Feb/2023. I will route note to Dr De Oliveira.

## 2023-05-30 ENCOUNTER — OFFICE VISIT (OUTPATIENT)
Dept: PHYSICAL MEDICINE AND REHAB | Facility: MEDICAL CENTER | Age: 63
End: 2023-05-30
Payer: COMMERCIAL

## 2023-05-30 VITALS
WEIGHT: 240.3 LBS | DIASTOLIC BLOOD PRESSURE: 70 MMHG | OXYGEN SATURATION: 94 % | HEIGHT: 66 IN | BODY MASS INDEX: 38.62 KG/M2 | TEMPERATURE: 87 F | HEART RATE: 87 BPM | SYSTOLIC BLOOD PRESSURE: 118 MMHG

## 2023-05-30 DIAGNOSIS — M25.532 LEFT WRIST PAIN: ICD-10-CM

## 2023-05-30 DIAGNOSIS — Z13.31 POSITIVE DEPRESSION SCREENING: ICD-10-CM

## 2023-05-30 DIAGNOSIS — G89.29 CHRONIC PAIN OF RIGHT THUMB: ICD-10-CM

## 2023-05-30 DIAGNOSIS — M79.644 CHRONIC PAIN OF RIGHT THUMB: ICD-10-CM

## 2023-05-30 DIAGNOSIS — M79.10 MYALGIA: ICD-10-CM

## 2023-05-30 DIAGNOSIS — M53.3 SACROILIAC JOINT DYSFUNCTION OF RIGHT SIDE: ICD-10-CM

## 2023-05-30 PROCEDURE — 3074F SYST BP LT 130 MM HG: CPT | Performed by: STUDENT IN AN ORGANIZED HEALTH CARE EDUCATION/TRAINING PROGRAM

## 2023-05-30 PROCEDURE — 1125F AMNT PAIN NOTED PAIN PRSNT: CPT | Performed by: STUDENT IN AN ORGANIZED HEALTH CARE EDUCATION/TRAINING PROGRAM

## 2023-05-30 PROCEDURE — 20553 NJX 1/MLT TRIGGER POINTS 3/>: CPT | Performed by: STUDENT IN AN ORGANIZED HEALTH CARE EDUCATION/TRAINING PROGRAM

## 2023-05-30 PROCEDURE — 99213 OFFICE O/P EST LOW 20 MIN: CPT | Mod: 25 | Performed by: STUDENT IN AN ORGANIZED HEALTH CARE EDUCATION/TRAINING PROGRAM

## 2023-05-30 PROCEDURE — 76942 ECHO GUIDE FOR BIOPSY: CPT | Performed by: STUDENT IN AN ORGANIZED HEALTH CARE EDUCATION/TRAINING PROGRAM

## 2023-05-30 PROCEDURE — 3078F DIAST BP <80 MM HG: CPT | Performed by: STUDENT IN AN ORGANIZED HEALTH CARE EDUCATION/TRAINING PROGRAM

## 2023-05-30 ASSESSMENT — PATIENT HEALTH QUESTIONNAIRE - PHQ9
CLINICAL INTERPRETATION OF PHQ2 SCORE: 2
SUM OF ALL RESPONSES TO PHQ QUESTIONS 1-9: 11
5. POOR APPETITE OR OVEREATING: 3 - NEARLY EVERY DAY

## 2023-05-30 ASSESSMENT — PAIN SCALES - GENERAL: PAINLEVEL: 3=SLIGHT PAIN

## 2023-05-30 ASSESSMENT — FIBROSIS 4 INDEX: FIB4 SCORE: 1.42

## 2023-05-30 NOTE — PROCEDURES
Patient Name: Laverne Riojas  : 1960  Date of Service: 2023    Physician/s: Hoa Garcia MD    Pre-operative Diagnosis: Myalgia (M79.1)    Post-operative Diagnosis: Myalgia (M79.1)    Procedure: trigger point injections of the following muscles:    Site R L   Splenius capitis     Semispinalis capitis     Splenius cervicis     Sternocleidomastoid     Rhomboids x x   Levator scapulae x x   Pectoralis minor     Pectoralis major     Serratus anterior     Supraspinatus     Infraspinatus     Teres minor     Teres major     Quadratus lumborum     Paravertebral, cervical x x   Paravertebral, thoracic     Paravertebral, lumbar     Gluteus jeremi     Gluteus medius     Gluteus minimus     Tensor fascia denton     Vastus lateralis     Adductor felix     Adductor longus     Occipitalis     Cervical paraspinal     Trapezius, upper x x   Trapezius, mid     Trapezius, lower     Latissimus dorsi       Description of procedure:    The risks, benefits, and alternatives of the procedure were reviewed and discussed with the patient.  Written informed consent was freely obtained. A pre-procedural time-out was conducted by the physician verifying patient’s identity, procedure to be performed, procedure site and side, and allergy verification. Appropriate equipment was determined to be in place for the procedure.     In the office suite exam room the patient was placed in a seated position and the skin areas for injection over the above muscles were marked. A total of 9 areas of pain were identified for injection. The areas of pain were then prepped and draped in the usual sterile fashion. A solution was prepared with 5 mL of 1% lidocaine and 5 mL of 0.5% bupivacaine. Ultrasound was confirmed to view the adjacent structures for blood vessels and nerves and to confirm the needle path was not within the structures nor was it too deep to be within the lung. A 27g needle was placed into each of the markings at the areas  above under ultrasound guidance with an out of plane approach.. After negative aspiration, approximately 1-1.5 mL of the above solution was injected. The needle was removed intact after each trigger point injection, and the patient's back was covered with a 4x4 gauze, the area was cleansed with sterile normal saline, and a dressing was applied. There were no complications noted. The images were uploaded to our media tab for permanent storage.    Hoa Garcia MD  Interventional Pain and Spine  Physical Medicine and Rehabilitation  [facility]

## 2023-05-30 NOTE — PROGRESS NOTES
Follow-up patient Note    Interventional Pain and Spine  Physiatry (Physical Medicine and Rehabilitation)     Patient Name: Laverne Riojas  : 1960  Date of service: 2023    Chief Complaint:   Chief Complaint   Patient presents with    Follow-Up     Sacroiliac joint dysfunction of right side       HISTORY  Please see new patient note by Dr. Garcia for more details.     HPI  Today's visit   Laverne Riojas ( 1960) is a female with Diagnoses of Myalgia, Chronic pain of right thumb, Left wrist pain, Sacroiliac joint dysfunction of right side, and Positive depression screening were pertinent to this visit.    Presents today after  2023 right sacroiliac joint injection with improvement. Having some pain also at her left glute just lateral to her left sacroiliac joint. Doing PT weekly with improvement. Denies pain radiating down the leg.    Went to a podiatrist who discussed an injection for scar tissue in her foot which she elected not to pursue. She is working on this with PT.    She notes ongoing sensation of muscular pain at her bilateral upper trapezius worse on the right and would like to pursue injections if reports possible.  She also notes left wrist pain and right thumb pain that have been chronic and is interested in further evaluation of this.  She has not had x-rays of these locations before.     Taking robaxin which helps about the same as flexeril.  She has denied side effects to these medications.     Pain severity 3/10 currently  Pt denies new numbness, tingling, or weakness.    Procedure history:  - 2023 right sacroiliac joint injection -significant improvement      ROS:   Red Flags ROS:   Fever, Chills, Sweats: Denies  Involuntary Weight Loss: Denies  Bladder Incontinence: Denies  Bowel Incontinence: denies  Saddle Anesthesia: Denies    All other systems reviewed and negative.     PMHx:   Past Medical History:   Diagnosis Date    Depression     Obesity        PSHx:    Past Surgical History:   Procedure Laterality Date    KS INJECTION,SACROILIAC JOINT Right 2023    Procedure: RIGHT sacroiliac joint injection with fluoroscopic guidance;  Surgeon: Hoa Garcia M.D.;  [facility];  Service: Pain Management    KS UPPER GI ENDOSCOPY,DIAGNOSIS N/A 2023    Procedure: GASTROSCOPY;  Surgeon: Debbie Taylor M.D.;  Location: SURGERY [facility] DAY [facility];  Service: Gastroenterology    TONSILLECTOMY         Family Hx:   Family History   Problem Relation Age of Onset    Arthritis Mother     Stroke Mother     Sleep Apnea Mother     Sleep Apnea Brother     Cancer Maternal Uncle        Social Hx:  Social History     Socioeconomic History    Marital status: Single     Spouse name: Not on file    Number of children: Not on file    Years of education: Not on file    Highest education level: Not on file   Occupational History    Not on file   Tobacco Use    Smoking status: Former     Packs/day: 0.25     Years: 25.00     Pack years: 6.25     Types: Cigarettes     Quit date: 2002     Years since quittin.8    Smokeless tobacco: Never   Vaping Use    Vaping Use: Never used   Substance and Sexual Activity    Alcohol use: Yes     Comment: occ    Drug use: Yes     Types: Marijuana     Comment: smoking and edibles daily for pain    Sexual activity: Not Currently     Partners: Male     Birth control/protection: Post-Menopausal   Other Topics Concern    Not on file   Social History Narrative    Not on file     Social Determinants of Health     Financial Resource Strain: Not on file   Food Insecurity: Not on file   Transportation Needs: Not on file   Physical Activity: Not on file   Stress: Not on file   Social Connections: Not on file   Intimate Partner Violence: Not on file   Housing Stability: Not on file       Allergies:  No Known Allergies    Medications: reviewed on epic.   Outpatient Medications Marked as Taking for the 23 encounter (Office Visit) with  "Hoa Garcia M.D.   Medication Sig Dispense Refill    methocarbamol (ROBAXIN) 500 MG Tab Take 1 Tablet by mouth 4 times a day as needed (muscle spasms. don't take within 8 hours of taking flexeril). 90 Tablet 2    omeprazole (PRILOSEC) 20 MG delayed-release capsule Take 1 Capsule by mouth every day. 30 Capsule 3    Probiotic Product (PROBIOTIC-10 PO) Take  by mouth.      albuterol 108 (90 Base) MCG/ACT Aero Soln inhalation aerosol Inhale 2 Puffs every four hours as needed for Shortness of Breath. 1 Each 0    vitamin D3 (CHOLECALCIFEROL) 1000 Unit (25 mcg) Tab Take 2,000 Units by mouth every day.          Current Outpatient Medications on File Prior to Visit   Medication Sig Dispense Refill    methocarbamol (ROBAXIN) 500 MG Tab Take 1 Tablet by mouth 4 times a day as needed (muscle spasms. don't take within 8 hours of taking flexeril). 90 Tablet 2    omeprazole (PRILOSEC) 20 MG delayed-release capsule Take 1 Capsule by mouth every day. 30 Capsule 3    Probiotic Product (PROBIOTIC-10 PO) Take  by mouth.      albuterol 108 (90 Base) MCG/ACT Aero Soln inhalation aerosol Inhale 2 Puffs every four hours as needed for Shortness of Breath. 1 Each 0    vitamin D3 (CHOLECALCIFEROL) 1000 Unit (25 mcg) Tab Take 2,000 Units by mouth every day.       No current facility-administered medications on file prior to visit.         EXAMINATION     Physical Exam:   /70 (BP Location: Right arm, Patient Position: Sitting, BP Cuff Size: Large adult)   Pulse 87   Temp (!) 30.6 °C (87 °F) (Temporal)   Ht 1.676 m (5' 6\")   Wt 109 kg (240 lb 4.8 oz)   SpO2 94%     Constitutional:   Body Habitus: Body mass index is 38.79 kg/m².  Cooperation: Fully cooperates with exam  Appearance: Well-groomed, well-nourished.    Eyes: No scleral icterus to suggest severe liver disease, no proptosis to suggest severe hyperthyroidism    ENT -no obvious auditory deficits, no noticeable facial droop     Skin -no rashes or lesions noted "     Respiratory-  breathing comfortably on room air, no audible wheezing    Cardiovascular-distal extremities warm and well perfused.  No lower extremity edema is noted.     Gastrointestinal - no obvious abdominal masses, non-distended    Psychiatric- alert and oriented ×3. Normal affect.     Gait - normal gait, no use of ambulatory device, nonantalgic. Heel walking and toe walking intact.     Musculoskeletal and Neuro -   Increased myofascial tension at bilateral upper trapezius, levator scapulae, cervical paraspinal muscles and rhomboids    Tenderness to palpation at left wrist and right CMC joint.  Negative CMC grind test bilaterally    Thoracic/Lumbar Spine/Sacral Spine/Hips   Inspection: No evidence of atrophy in bilateral lower extremities throughout      There is limited active range of motion with lumbar extension     Facet loading maneuver negative bilaterally     Palpation:   Tenderness to palpation over the sacroiliac joint on right and lateral to bilateral sacroiliac joints. No tenderness to palpation elsewhere in the low back/hips including midline of lumbosacral spine, paraspinal muscles bilaterally, lumbar facets bilaterally spanning approximately L1-L5 levels, sacroiliac joint on left, PSIS bilaterally, and greater trochanters bilaterally.     Lumbar spine /hip provocative exam maneuvers  Straight leg raise negative bilaterally  FADIR test negative bilaterally     SI joint tests  GIDEON test negative bilaterally  Thigh thrust test negative bilaterally  SI joint compression negative bilaterally  SI joint distraction negative bilaterally  Sacral thrust test positive on right, negative on left  Yeomans maneuver negative bilaterally        Key points for the international standards for neurological classification of spinal cord injury (ISNCSCI) to light touch.   Dermatome R L   L2 2 2   L3 2 2   L4 2 2   L5 2 2   S1 2 2   S2 2 2         Motor Exam Lower Extremities  ? Myotome R L   Hip flexion L2 5 5    Knee extension L3 5 5   Ankle dorsiflexion L4 5 5   Toe extension L5 5 5   Ankle plantarflexion S1 5 5      Previous exam  Reflexes  ?   R L   Patella   2+ 2+   Achilles    2+ 2+      Clonus of the ankle negative bilaterally          MEDICAL DECISION MAKING    Medical records review: see under HPI section.     DATA    Labs: No new labs available for review since last visit.   Lab Results   Component Value Date/Time    SODIUM 140 02/06/2023 02:03 AM    POTASSIUM 4.1 02/06/2023 02:03 AM    CHLORIDE 106 02/06/2023 02:03 AM    CO2 24 02/06/2023 02:03 AM    ANION 10.0 02/05/2023 09:00 AM    GLUCOSE 100 (H) 02/06/2023 02:03 AM    BUN 11 02/06/2023 02:03 AM    CREATININE 0.83 02/06/2023 02:03 AM    CALCIUM 8.8 02/06/2023 02:03 AM    ASTSGOT 21 02/05/2023 09:00 AM    ALTSGPT 13 02/05/2023 09:00 AM    TBILIRUBIN 0.4 02/05/2023 09:00 AM    ALBUMIN 3.6 02/06/2023 02:03 AM    TOTPROTEIN 6.1 02/05/2023 09:00 AM    GLOBULIN 2.4 02/05/2023 09:00 AM    AGRATIO 1.5 02/05/2023 09:00 AM       Lab Results   Component Value Date/Time    PROTHROMBTM 13.3 02/02/2023 04:47 AM    INR 1.02 02/02/2023 04:47 AM        Lab Results   Component Value Date/Time    WBC 6.0 02/06/2023 02:03 AM    RBC 4.35 02/06/2023 02:03 AM    HEMOGLOBIN 12.7 02/06/2023 02:03 AM    HEMATOCRIT 38.8 02/06/2023 02:03 AM    MCV 89.2 02/06/2023 02:03 AM    MCH 29.2 02/06/2023 02:03 AM    MCHC 32.7 (L) 02/06/2023 02:03 AM    MPV 10.6 02/06/2023 02:03 AM    NEUTSPOLYS 57.90 02/06/2023 02:03 AM    LYMPHOCYTES 30.70 02/06/2023 02:03 AM    MONOCYTES 7.20 02/06/2023 02:03 AM    EOSINOPHILS 3.50 02/06/2023 02:03 AM    BASOPHILS 0.50 02/06/2023 02:03 AM    HYPOCHROMIA 1+ 02/01/2014 04:25 PM        Lab Results   Component Value Date/Time    HBA1C 5.6 10/26/2015 09:30 AM        Imaging:   I personally reviewed following images, these are my reads  CT chest abdomen pelvis 2/4/2023  Lumbar disc height spaces appear to be well preserved.  Anterior body osteophytes most notable at  T12, T11, T10, T9, and T8.  Facet arthropathy most notable at bilateral lower lumbar levels.  See formal radiology report for further details.     IMAGING radiology reads. I reviewed the following radiology reads   CT chest abdomen pelvis 2023  FINDINGS:     Noncontrast images:  There is no intramural hematoma.     There is calcific atherosclerotic plaque.     Contrast images:     Aorta and vasculature: No abdominal aortic aneurysm or dissection. BILATERAL iliac arteries appear unremarkable.  Celiac artery, superior mesenteric artery and inferior mesenteric artery demonstrate no stenosis or abrupt proximal occlusion. Single BILATERAL renal arteries appear unremarkable     Large RIGHT hepatic hemangioma is redemonstrated and measures up to 7.5 cm on today's study  Spleen is normal in size.     Pancreas is unremarkable.  Gallbladder and biliary tree are normal.     Adrenal glands are normal.  Kidneys enhance symmetrically.     There is distal colonic wall thickening and adjacent fat stranding which is similar to the prior study. There are distal colonic diverticuli. Appendix appears unremarkable. No evidence of bowel obstruction. Moderate-sized hiatal hernia  There is trace free fluid in the pelvis, similar to the prior study           3D angiographic/MIP images of the vasculature confirm the vascular findings as described above.     IMPRESSION:     1.  Unremarkable CTA of the mesenteric vessels  2.  Ongoing distal colitis or diverticulitis  3.  Trace pelvic fluid  4.  7.5 cm liver hemangioma                     Diagnosis  Visit Diagnoses     ICD-10-CM   1. Myalgia  M79.10   2. Chronic pain of right thumb  M79.644    G89.29   3. Left wrist pain  M25.532   4. Sacroiliac joint dysfunction of right side  M53.3   5. Positive depression screening  Z13.31           ASSESSMENT AND PLAN:  Laverne Riojas (: 1960) is a female with improvement in sacroiliac joint pain after right sacroiliac joint injection.   Now with main area of pain lateral to her bilateral sacroiliac joints worse on the right.  Also with myalgia pain and pain at her left wrist and right thumb.       Laverne was seen today for follow-up.    Diagnoses and all orders for this visit:    Myalgia  -     Referral to Pain Clinic  -     Consent for all Surgical, Special Diagnostic or Therapeutic Procedures    Chronic pain of right thumb  -     DX-HAND 3+ RIGHT; Future    Left wrist pain  -     DX-HAND 3+ LEFT; Future    Sacroiliac joint dysfunction of right side    Positive depression screening  -     Patient has been identified as having a positive depression screening. Appropriate orders and counseling have been given.            PLAN  Physical Therapy: Continue physical therapy.       Diagnostic workup: X-rays ordered today     Medications:   - given the myofascial component of pain, continue Robaxin.  I have discussed that she should not take this within 8 hours of taking Flexeril.  She would like to try a possibly less sedating alternative to Flexeril so that she can try taking medication during her workday. Counseled on possible side effect of drowsiness and dizziness and discussed that the patient should discontinue this if the side effects are too severe. Counseled patient to initially try taking this medication at bedtime.  -Discussed that she may take tylenol as needed up to 4 grams per day, in divided doses at least 6 hours apart. Do not take more than 1 gram at a time.     Interventions:   - R sacroiliac joint injection as needed given significant improvement in this pain in the past  - Trigger point injections under ultrasound guidance. The risks, benefits, and alternatives to this procedure were discussed and the patient wishes to proceed with the procedure. Risks include but are not limited to damage to surrounding structures, infection, bleeding, worsening of pain which can be permanent, and collapsed lung. Benefits include pain relief and  improved function. Alternatives include not doing the procedure.    Referrals:  -Referral to podiatry for evaluation and management of appears to be possible left plantar fasciitis and right ankle pain    Follow-up: 1 month.  Plan to message patient regarding results of x-rays.  If injection appears indicated, consider moving follow-up appointment to a sooner date.    Orders Placed This Encounter    DX-HAND 3+ RIGHT    DX-HAND 3+ LEFT    Referral to Pain Clinic    Patient has been identified as having a positive depression screening. Appropriate orders and counseling have been given.    Consent for all Surgical, Special Diagnostic or Therapeutic Procedures       Hoa Garcia MD  Interventional Pain and Spine  Physical Medicine and Rehabilitation  [facility]      The above note documents my personal evaluation of this patient. In addition, I have reviewed and confirmed with the patient and MA the supportive information documented in today's Patient Health Questionnaire and Office Note.     Please note that this dictation was created using voice recognition software. I have made every reasonable attempt to correct obvious errors, but I expect that there are errors of grammar and possibly content that I did not discover before finalizing the note.

## 2023-06-06 ENCOUNTER — OFFICE VISIT (OUTPATIENT)
Dept: INTERNAL MEDICINE | Facility: OTHER | Age: 63
End: 2023-06-06
Payer: COMMERCIAL

## 2023-06-06 VITALS
TEMPERATURE: 98 F | HEIGHT: 66 IN | WEIGHT: 239.6 LBS | DIASTOLIC BLOOD PRESSURE: 71 MMHG | BODY MASS INDEX: 38.51 KG/M2 | SYSTOLIC BLOOD PRESSURE: 112 MMHG | OXYGEN SATURATION: 96 % | HEART RATE: 71 BPM

## 2023-06-06 DIAGNOSIS — K21.9 GASTROESOPHAGEAL REFLUX DISEASE WITHOUT ESOPHAGITIS: ICD-10-CM

## 2023-06-06 DIAGNOSIS — Z12.4 PAPANICOLAOU SMEAR FOR CERVICAL CANCER SCREENING: ICD-10-CM

## 2023-06-06 PROCEDURE — 99214 OFFICE O/P EST MOD 30 MIN: CPT | Mod: GC

## 2023-06-06 PROCEDURE — 3078F DIAST BP <80 MM HG: CPT | Mod: GC

## 2023-06-06 PROCEDURE — 3074F SYST BP LT 130 MM HG: CPT | Mod: GC

## 2023-06-06 RX ORDER — OMEPRAZOLE 20 MG/1
20 CAPSULE, DELAYED RELEASE ORAL 2 TIMES DAILY
Qty: 60 CAPSULE | Refills: 2 | Status: SHIPPED | OUTPATIENT
Start: 2023-06-06 | End: 2023-07-24 | Stop reason: SDUPTHER

## 2023-06-06 ASSESSMENT — FIBROSIS 4 INDEX: FIB4 SCORE: 1.42

## 2023-06-06 NOTE — PROGRESS NOTES
Date of Service:  6/6/23    CC: pap smear     HPI:  Laverne Riojas is a 62 y.o. female with a PMH of  sacroiliac joint dysfunction/chronic back pain followed by PM&R, diverticulitis, hyperlipidemia, marijuana use, GERD with reported Barretts esophagus on most recent EGD (pending GI records), HLD, here today for pap smear.     Last pap smear about 5-10 year sago. States she was told she had an abnormal pap smear about 20+ years ago and the repeat pap was normal, no colposcopy needed. No hx of abnormal vaginal bleeding or discharges and no hx of hysterectomy.     Review of systems:  ROS as per HPI    Past Medical History:  Patient Active Problem List    Diagnosis Date Noted    Chronic fatigue 05/09/2023    Gastroesophageal reflux disease without esophagitis 03/14/2023    Diverticulitis 02/23/2023    Chronic bilateral back pain 02/23/2023    Hypercholesteremia 02/02/2023    Postural dizziness with presyncope 02/01/2023    Hip pain, right 01/10/2023    Reactive airway disease 12/19/2022    Hemangioma of skin 01/30/2022    Right foot pain 01/30/2022    Shortness of breath 01/30/2022    Obesity (BMI 30-39.9) 01/28/2022    Snoring 07/14/2017    Marijuana use 07/14/2017    Moderate episode of recurrent major depressive disorder (HCC) 07/14/2017     Past Surgical History:    has a past surgical history that includes tonsillectomy; pr upper gi endoscopy,diagnosis (N/A, 2/2/2023); and pr injection,sacroiliac joint (Right, 5/2/2023).    Medications:  Current Outpatient Medications   Medication Sig Dispense Refill    omeprazole (PRILOSEC) 20 MG delayed-release capsule Take 1 Capsule by mouth 2 times a day. 60 Capsule 2    methocarbamol (ROBAXIN) 500 MG Tab Take 1 Tablet by mouth 4 times a day as needed (muscle spasms. don't take within 8 hours of taking flexeril). 90 Tablet 2    albuterol 108 (90 Base) MCG/ACT Aero Soln inhalation aerosol Inhale 2 Puffs every four hours as needed for Shortness of Breath. 1 Each 0     vitamin D3 (CHOLECALCIFEROL) 1000 Unit (25 mcg) Tab Take 2,000 Units by mouth every day.      Probiotic Product (PROBIOTIC-10 PO) Take  by mouth. (Patient not taking: Reported on 2023)       No current facility-administered medications for this visit.       Allergies:  No Known Allergies    Family History:   family history includes Arthritis in her mother; Cancer in her maternal uncle; Sleep Apnea in her brother and mother; Stroke in her mother.     Social History:    Social History     Tobacco Use    Smoking status: Former     Packs/day: 0.25     Years: 25.00     Pack years: 6.25     Types: Cigarettes     Quit date: 2002     Years since quittin.9    Smokeless tobacco: Never   Vaping Use    Vaping Use: Never used   Substance Use Topics    Alcohol use: Yes     Comment: occ    Drug use: Yes     Types: Marijuana     Comment: smoking and edibles daily for pain     Physical Exam:  Vitals:    23 1304   BP: 112/71   Pulse: 71   Temp: 36.7 °C (98 °F)   SpO2: 96%     Body mass index is 38.67 kg/m².  Physical Exam  Constitutional:       General: She is not in acute distress.     Appearance: Normal appearance.   HENT:      Head: Normocephalic and atraumatic.      Right Ear: External ear normal.      Left Ear: External ear normal.   Eyes:      General: No scleral icterus.     Extraocular Movements: Extraocular movements intact.      Conjunctiva/sclera: Conjunctivae normal.   Cardiovascular:      Rate and Rhythm: Normal rate.   Pulmonary:      Effort: No respiratory distress.   Genitourinary:     General: Normal vulva.      Vagina: No vaginal discharge.      Comments: No abnormal lesions noted to vaginal canal, cervical os without abnormal discharge.  Musculoskeletal:      Cervical back: Normal range of motion.      Right lower leg: No edema.      Left lower leg: No edema.   Skin:     General: Skin is warm and dry.   Neurological:      General: No focal deficit present.      Mental Status: She is alert.    Psychiatric:         Mood and Affect: Mood normal.         Behavior: Behavior normal.        Assessment/Plan:  1. Papanicolaou smear for cervical cancer screening  - THINPREP PAP WITH HPV; Future    2. Gastroesophageal reflux disease without esophagitis  - refill omeprazole (PRILOSEC) 20 MG delayed-release capsule; Take 1 Capsule by mouth 2 times a day.  Dispense: 60 Capsule; Refill: 2  - patient states on recent EGD told to have barretts esophagus and she was instructed to take her omeprazole 20mg BID, will refill med and reflect current administration dose.  - will need to obtain GI records     All imaging results and lab results and consult notes are reviewed at this visit.  Followup: Return in about 1 month (around 7/6/2023). For other medical problems after she obtains bloodwork. Per chart review pt with HLD but not compliant with her statin, will need to discuss this at next visit.    Mar Henderson, DO  Internal Medicine PGY-2

## 2023-06-08 ENCOUNTER — APPOINTMENT (OUTPATIENT)
Dept: RADIOLOGY | Facility: MEDICAL CENTER | Age: 63
End: 2023-06-08
Attending: STUDENT IN AN ORGANIZED HEALTH CARE EDUCATION/TRAINING PROGRAM
Payer: COMMERCIAL

## 2023-06-08 DIAGNOSIS — M25.532 LEFT WRIST PAIN: ICD-10-CM

## 2023-06-08 DIAGNOSIS — M79.644 CHRONIC PAIN OF RIGHT THUMB: ICD-10-CM

## 2023-06-08 DIAGNOSIS — G89.29 CHRONIC PAIN OF RIGHT THUMB: ICD-10-CM

## 2023-06-08 PROCEDURE — 73130 X-RAY EXAM OF HAND: CPT | Mod: RT

## 2023-06-08 PROCEDURE — 73130 X-RAY EXAM OF HAND: CPT | Mod: LT

## 2023-06-14 DIAGNOSIS — Z12.4 PAPANICOLAOU SMEAR FOR CERVICAL CANCER SCREENING: ICD-10-CM

## 2023-06-30 ENCOUNTER — APPOINTMENT (OUTPATIENT)
Dept: PHYSICAL MEDICINE AND REHAB | Facility: MEDICAL CENTER | Age: 63
End: 2023-06-30
Payer: COMMERCIAL

## 2023-06-30 NOTE — PROGRESS NOTES
Follow-up patient Note    Interventional Pain and Spine  Physiatry (Physical Medicine and Rehabilitation)     Patient Name: Laverne Riojas  : 1960  Date of service: 2023    Chief Complaint:   No chief complaint on file.      HISTORY  Please see new patient note by Dr. Garcia for more details.     HPI  Today's visit   Laverne Riojas ( 1960) is a female with There were no encounter diagnoses.    Presents today after 23 trigger point injections        2023 right sacroiliac joint injection with improvement. Having some pain also at her left glute just lateral to her left sacroiliac joint. Doing PT weekly with improvement. Denies pain radiating down the leg.    Went to a podiatrist who discussed an injection for scar tissue in her foot which she elected not to pursue. She is working on this with PT.    She notes ongoing sensation of muscular pain at her bilateral upper trapezius worse on the right and would like to pursue injections if reports possible.  She also notes left wrist pain and right thumb pain that have been chronic and is interested in further evaluation of this.  She has not had x-rays of these locations before.     Taking robaxin which helps about the same as flexeril.  She has denied side effects to these medications.     Pain severity 3/10 currently  Pt denies new numbness, tingling, or weakness.    Procedure history:  - 2023 right sacroiliac joint injection -significant improvement  - 23 trigger point injections     ROS:   Red Flags ROS:   Fever, Chills, Sweats: Denies  Involuntary Weight Loss: Denies  Bladder Incontinence: Denies  Bowel Incontinence: denies  Saddle Anesthesia: Denies    All other systems reviewed and negative.     PMHx:   Past Medical History:   Diagnosis Date    Depression     Obesity        PSHx:   Past Surgical History:   Procedure Laterality Date    CO INJECTION,SACROILIAC JOINT Right 2023    Procedure: RIGHT sacroiliac joint  injection with fluoroscopic guidance;  Surgeon: Hoa Garcia M.D.;  [facility];  Service: Pain Management    VT UPPER GI ENDOSCOPY,DIAGNOSIS N/A 2023    Procedure: GASTROSCOPY;  Surgeon: Debbie Taylor M.D.;  Location: SURGERY SAME DAY [facility];  Service: Gastroenterology    TONSILLECTOMY         Family Hx:   Family History   Problem Relation Age of Onset    Arthritis Mother     Stroke Mother     Sleep Apnea Mother     Sleep Apnea Brother     Cancer Maternal Uncle        Social Hx:  Social History     Socioeconomic History    Marital status: Single     Spouse name: Not on file    Number of children: Not on file    Years of education: Not on file    Highest education level: Not on file   Occupational History    Not on file   Tobacco Use    Smoking status: Former     Packs/day: 0.25     Years: 25.00     Pack years: 6.25     Types: Cigarettes     Quit date: 2002     Years since quittin.9    Smokeless tobacco: Never   Vaping Use    Vaping Use: Never used   Substance and Sexual Activity    Alcohol use: Yes     Comment: occ    Drug use: Yes     Types: Marijuana     Comment: smoking and edibles daily for pain    Sexual activity: Not Currently     Partners: Male     Birth control/protection: Post-Menopausal   Other Topics Concern    Not on file   Social History Narrative    Not on file     Social Determinants of Health     Financial Resource Strain: Not on file   Food Insecurity: Not on file   Transportation Needs: Not on file   Physical Activity: Not on file   Stress: Not on file   Social Connections: Not on file   Intimate Partner Violence: Not on file   Housing Stability: Not on file       Allergies:  No Known Allergies    Medications: reviewed on epic.   No outpatient medications have been marked as taking for the 23 encounter (Appointment) with Hoa Garcia M.D..        Current Outpatient Medications on File Prior to Visit   Medication Sig Dispense Refill     omeprazole (PRILOSEC) 20 MG delayed-release capsule Take 1 Capsule by mouth 2 times a day. 60 Capsule 2    methocarbamol (ROBAXIN) 500 MG Tab Take 1 Tablet by mouth 4 times a day as needed (muscle spasms. don't take within 8 hours of taking flexeril). 90 Tablet 2    Probiotic Product (PROBIOTIC-10 PO) Take  by mouth. (Patient not taking: Reported on 6/6/2023)      albuterol 108 (90 Base) MCG/ACT Aero Soln inhalation aerosol Inhale 2 Puffs every four hours as needed for Shortness of Breath. 1 Each 0    vitamin D3 (CHOLECALCIFEROL) 1000 Unit (25 mcg) Tab Take 2,000 Units by mouth every day.       No current facility-administered medications on file prior to visit.         EXAMINATION     Physical Exam:   There were no vitals taken for this visit.    Constitutional:   Body Habitus: There is no height or weight on file to calculate BMI.  Cooperation: Fully cooperates with exam  Appearance: Well-groomed, well-nourished.    Eyes: No scleral icterus to suggest severe liver disease, no proptosis to suggest severe hyperthyroidism    ENT -no obvious auditory deficits, no noticeable facial droop     Skin -no rashes or lesions noted     Respiratory-  breathing comfortably on room air, no audible wheezing    Cardiovascular-distal extremities warm and well perfused.  No lower extremity edema is noted.     Gastrointestinal - no obvious abdominal masses, non-distended    Psychiatric- alert and oriented ×3. Normal affect.     Gait - normal gait, no use of ambulatory device, nonantalgic. Heel walking and toe walking intact.     Musculoskeletal and Neuro -   Increased myofascial tension at bilateral upper trapezius, levator scapulae, cervical paraspinal muscles and rhomboids    Tenderness to palpation at left wrist and right CMC joint.  Negative CMC grind test bilaterally    Thoracic/Lumbar Spine/Sacral Spine/Hips   Inspection: No evidence of atrophy in bilateral lower extremities throughout      There is limited active range of  motion with lumbar extension     Facet loading maneuver negative bilaterally     Palpation:   Tenderness to palpation over the sacroiliac joint on right and lateral to bilateral sacroiliac joints. No tenderness to palpation elsewhere in the low back/hips including midline of lumbosacral spine, paraspinal muscles bilaterally, lumbar facets bilaterally spanning approximately L1-L5 levels, sacroiliac joint on left, PSIS bilaterally, and greater trochanters bilaterally.     Lumbar spine /hip provocative exam maneuvers  Straight leg raise negative bilaterally  FADIR test negative bilaterally     SI joint tests  GIDEON test negative bilaterally  Thigh thrust test negative bilaterally  SI joint compression negative bilaterally  SI joint distraction negative bilaterally  Sacral thrust test positive on right, negative on left  Yeomans maneuver negative bilaterally        Key points for the international standards for neurological classification of spinal cord injury (ISNCSCI) to light touch.   Dermatome R L   L2 2 2   L3 2 2   L4 2 2   L5 2 2   S1 2 2   S2 2 2         Motor Exam Lower Extremities  ? Myotome R L   Hip flexion L2 5 5   Knee extension L3 5 5   Ankle dorsiflexion L4 5 5   Toe extension L5 5 5   Ankle plantarflexion S1 5 5      Previous exam  Reflexes  ?   R L   Patella   2+ 2+   Achilles    2+ 2+      Clonus of the ankle negative bilaterally          MEDICAL DECISION MAKING    Medical records review: see under HPI section.     DATA    Labs: No new labs available for review since last visit.   Lab Results   Component Value Date/Time    SODIUM 140 02/06/2023 02:03 AM    POTASSIUM 4.1 02/06/2023 02:03 AM    CHLORIDE 106 02/06/2023 02:03 AM    CO2 24 02/06/2023 02:03 AM    ANION 10.0 02/05/2023 09:00 AM    GLUCOSE 100 (H) 02/06/2023 02:03 AM    BUN 11 02/06/2023 02:03 AM    CREATININE 0.83 02/06/2023 02:03 AM    CALCIUM 8.8 02/06/2023 02:03 AM    ASTSGOT 21 02/05/2023 09:00 AM    ALTSGPT 13 02/05/2023 09:00 AM     TBILIRUBIN 0.4 02/05/2023 09:00 AM    ALBUMIN 3.6 02/06/2023 02:03 AM    TOTPROTEIN 6.1 02/05/2023 09:00 AM    GLOBULIN 2.4 02/05/2023 09:00 AM    AGRATIO 1.5 02/05/2023 09:00 AM       Lab Results   Component Value Date/Time    PROTHROMBTM 13.3 02/02/2023 04:47 AM    INR 1.02 02/02/2023 04:47 AM        Lab Results   Component Value Date/Time    WBC 6.0 02/06/2023 02:03 AM    RBC 4.35 02/06/2023 02:03 AM    HEMOGLOBIN 12.7 02/06/2023 02:03 AM    HEMATOCRIT 38.8 02/06/2023 02:03 AM    MCV 89.2 02/06/2023 02:03 AM    MCH 29.2 02/06/2023 02:03 AM    MCHC 32.7 (L) 02/06/2023 02:03 AM    MPV 10.6 02/06/2023 02:03 AM    NEUTSPOLYS 57.90 02/06/2023 02:03 AM    LYMPHOCYTES 30.70 02/06/2023 02:03 AM    MONOCYTES 7.20 02/06/2023 02:03 AM    EOSINOPHILS 3.50 02/06/2023 02:03 AM    BASOPHILS 0.50 02/06/2023 02:03 AM    HYPOCHROMIA 1+ 02/01/2014 04:25 PM        Lab Results   Component Value Date/Time    HBA1C 5.6 10/26/2015 09:30 AM        Imaging:   I personally reviewed following images, these are my reads  CT chest abdomen pelvis 2/4/2023  Lumbar disc height spaces appear to be well preserved.  Anterior body osteophytes most notable at T12, T11, T10, T9, and T8.  Facet arthropathy most notable at bilateral lower lumbar levels.  See formal radiology report for further details.     XR right hand 6/8/23  ***    XR left hand 6/8/23  ***    IMAGING radiology reads. I reviewed the following radiology reads   XR right hand 6/8/23  FINDINGS: Bone mineralization is normal. There is no evidence of fracture or dislocation. Soft tissues are normal. No evidence of arthropathy. No evidence of erosive change.     IMPRESSION:     No evidence of fracture or arthropathy.    XR left hand 6/8/23  FINDINGS: Bone mineralization is normal. There is no evidence of fracture or dislocation. Soft tissues are normal. No evidence of arthropathy. No evidence of erosive change.     IMPRESSION:     No evidence of fracture or arthropathy.    CT chest abdomen  pelvis 2023  FINDINGS:     Noncontrast images:  There is no intramural hematoma.     There is calcific atherosclerotic plaque.     Contrast images:     Aorta and vasculature: No abdominal aortic aneurysm or dissection. BILATERAL iliac arteries appear unremarkable.  Celiac artery, superior mesenteric artery and inferior mesenteric artery demonstrate no stenosis or abrupt proximal occlusion. Single BILATERAL renal arteries appear unremarkable     Large RIGHT hepatic hemangioma is redemonstrated and measures up to 7.5 cm on today's study  Spleen is normal in size.     Pancreas is unremarkable.  Gallbladder and biliary tree are normal.     Adrenal glands are normal.  Kidneys enhance symmetrically.     There is distal colonic wall thickening and adjacent fat stranding which is similar to the prior study. There are distal colonic diverticuli. Appendix appears unremarkable. No evidence of bowel obstruction. Moderate-sized hiatal hernia  There is trace free fluid in the pelvis, similar to the prior study           3D angiographic/MIP images of the vasculature confirm the vascular findings as described above.     IMPRESSION:     1.  Unremarkable CTA of the mesenteric vessels  2.  Ongoing distal colitis or diverticulitis  3.  Trace pelvic fluid  4.  7.5 cm liver hemangioma                     Diagnosis  {No diagnosis found. (Refresh or delete this SmartLink)}          ASSESSMENT AND PLAN:  Laverne Riojas (: 1960) is a female with improvement in sacroiliac joint pain after right sacroiliac joint injection.  Now with main area of pain lateral to her bilateral sacroiliac joints worse on the right.  Also with myalgia pain and pain at her left wrist and right thumb.       There are no diagnoses linked to this encounter.          PLAN  Physical Therapy: Continue physical therapy.       Diagnostic workup: X-rays ordered today     Medications:   - given the myofascial component of pain, continue Robaxin.  I have  discussed that she should not take this within 8 hours of taking Flexeril.  She would like to try a possibly less sedating alternative to Flexeril so that she can try taking medication during her workday. Counseled on possible side effect of drowsiness and dizziness and discussed that the patient should discontinue this if the side effects are too severe. Counseled patient to initially try taking this medication at bedtime.  -Discussed that she may take tylenol as needed up to 4 grams per day, in divided doses at least 6 hours apart. Do not take more than 1 gram at a time.     Interventions:   - R sacroiliac joint injection as needed given significant improvement in this pain in the past  - Trigger point injections under ultrasound guidance. The risks, benefits, and alternatives to this procedure were discussed and the patient wishes to proceed with the procedure. Risks include but are not limited to damage to surrounding structures, infection, bleeding, worsening of pain which can be permanent, and collapsed lung. Benefits include pain relief and improved function. Alternatives include not doing the procedure.    Referrals:  -Referral to podiatry for evaluation and management of appears to be possible left plantar fasciitis and right ankle pain    Follow-up: 1 month.  Plan to message patient regarding results of x-rays.  If injection appears indicated, consider moving follow-up appointment to a sooner date.    No orders of the defined types were placed in this encounter.      Hoa Garcia MD  Interventional Pain and Spine  Physical Medicine and Rehabilitation  [facility]      The above note documents my personal evaluation of this patient. In addition, I have reviewed and confirmed with the patient and MA the supportive information documented in today's Patient Health Questionnaire and Office Note.     Please note that this dictation was created using voice recognition software. I have made every  reasonable attempt to correct obvious errors, but I expect that there are errors of grammar and possibly content that I did not discover before finalizing the note.

## 2023-07-24 ENCOUNTER — OFFICE VISIT (OUTPATIENT)
Dept: INTERNAL MEDICINE | Facility: OTHER | Age: 63
End: 2023-07-24
Payer: COMMERCIAL

## 2023-07-24 VITALS
SYSTOLIC BLOOD PRESSURE: 105 MMHG | BODY MASS INDEX: 40.65 KG/M2 | TEMPERATURE: 98.2 F | HEIGHT: 65 IN | WEIGHT: 244 LBS | HEART RATE: 75 BPM | OXYGEN SATURATION: 94 % | DIASTOLIC BLOOD PRESSURE: 69 MMHG

## 2023-07-24 DIAGNOSIS — K21.9 GASTROESOPHAGEAL REFLUX DISEASE WITHOUT ESOPHAGITIS: ICD-10-CM

## 2023-07-24 DIAGNOSIS — E78.5 HYPERLIPIDEMIA, UNSPECIFIED HYPERLIPIDEMIA TYPE: ICD-10-CM

## 2023-07-24 DIAGNOSIS — Z13.228 SCREENING FOR METABOLIC DISORDER: ICD-10-CM

## 2023-07-24 DIAGNOSIS — Z86.69 HISTORY OF SLEEP APNEA: ICD-10-CM

## 2023-07-24 DIAGNOSIS — E66.01 OBESITY, CLASS III, BMI 40-49.9 (MORBID OBESITY) (HCC): ICD-10-CM

## 2023-07-24 DIAGNOSIS — R53.83 OTHER FATIGUE: ICD-10-CM

## 2023-07-24 PROCEDURE — 3074F SYST BP LT 130 MM HG: CPT | Mod: GC

## 2023-07-24 PROCEDURE — 99214 OFFICE O/P EST MOD 30 MIN: CPT | Mod: GC

## 2023-07-24 PROCEDURE — 3078F DIAST BP <80 MM HG: CPT | Mod: GC

## 2023-07-24 RX ORDER — OMEPRAZOLE 20 MG/1
20 CAPSULE, DELAYED RELEASE ORAL 2 TIMES DAILY
Qty: 60 CAPSULE | Refills: 2 | Status: SHIPPED | OUTPATIENT
Start: 2023-07-24

## 2023-07-24 RX ORDER — ROSUVASTATIN CALCIUM 20 MG/1
20 TABLET, COATED ORAL EVERY EVENING
Qty: 30 TABLET | Refills: 5 | Status: SHIPPED | OUTPATIENT
Start: 2023-07-24

## 2023-07-24 ASSESSMENT — FIBROSIS 4 INDEX: FIB4 SCORE: 1.42

## 2023-07-24 NOTE — PROGRESS NOTES
Date of Service:  7/25/23    CC: followup    HPI:  Laverne Riojas  is a 62 y.o. female with a PMH of sacroiliac joint dysfunction/chronic back pain followed by PM&R, diverticulitis, hyperlipidemia, marijuana use, GERD with reported Barretts esophagus on most recent EGD (pending GI records), HLD, here today for followup.     Obesity- previous attempts to send patient for bariatric surgery but patient was notified by Dr. Hartman's office that her insurance does not cover such a procedure and appt was cancelled. Pt's mom had bariatric surgery and was diabetic prior. Patient asking about weight loss medication. Has never seen a nutritionist, currently tries to eat better but states still having lots of cravings for chips and carbs.     Patient states was told she had sleep apnea by sleep study that was performed as part of a pre bariatric checklist many years ago but states she never followed up with this. Not on pap therapy. States she does feel tired all the time, would wake up feeling tired. + snoring. Would wake up sometimes throughout the night.    Review of systems:  Review of Systems   Constitutional:  Negative for chills and fever.   HENT: Negative.     Eyes: Negative.    Respiratory:  Negative for cough and shortness of breath.    Cardiovascular:  Negative for chest pain and leg swelling.   Gastrointestinal:  Negative for abdominal pain, nausea and vomiting.   Genitourinary: Negative.    Musculoskeletal:  Negative for myalgias.   Skin: Negative.    Neurological: Negative.       Past Medical History:  Patient Active Problem List    Diagnosis Date Noted    Chronic fatigue 05/09/2023    Gastroesophageal reflux disease without esophagitis 03/14/2023    Diverticulitis 02/23/2023    Chronic bilateral back pain 02/23/2023    Hypercholesteremia 02/02/2023    Postural dizziness with presyncope 02/01/2023    Hip pain, right 01/10/2023    Reactive airway disease 12/19/2022    Hemangioma of skin 01/30/2022    Right  foot pain 2022    Shortness of breath 2022    Obesity (BMI 30-39.9) 2022    Snoring 2017    Marijuana use 2017    Moderate episode of recurrent major depressive disorder (HCC) 2017     Past Surgical History:    has a past surgical history that includes tonsillectomy; pr upper gi endoscopy,diagnosis (N/A, 2023); and pr injection,sacroiliac joint (Right, 2023).    Medications:  Current Outpatient Medications   Medication Sig Dispense Refill    omeprazole (PRILOSEC) 20 MG delayed-release capsule Take 1 Capsule by mouth 2 times a day. 60 Capsule 2    methocarbamol (ROBAXIN) 500 MG Tab Take 1 Tablet by mouth 4 times a day as needed (muscle spasms. don't take within 8 hours of taking flexeril). 90 Tablet 2    albuterol 108 (90 Base) MCG/ACT Aero Soln inhalation aerosol Inhale 2 Puffs every four hours as needed for Shortness of Breath. 1 Each 0    vitamin D3 (CHOLECALCIFEROL) 1000 Unit (25 mcg) Tab Take 2,000 Units by mouth every day.      Probiotic Product (PROBIOTIC-10 PO) Take  by mouth. (Patient not taking: Reported on 2023)       No current facility-administered medications for this visit.     Allergies:  No Known Allergies    Family History:   family history includes Arthritis in her mother; Cancer in her maternal uncle; Sleep Apnea in her brother and mother; Stroke in her mother.   DM- maternal aunts and uncles, grandmother   Mom- several strokes     Unsure what type of cancer in aunts and uncles. Dont think it was thyroid cancer.    Social History:    Social History     Tobacco Use    Smoking status: Former     Packs/day: 0.25     Years: 25.00     Pack years: 6.25     Types: Cigarettes     Quit date: 2002     Years since quittin.0    Smokeless tobacco: Never   Vaping Use    Vaping Use: Never used   Substance Use Topics    Alcohol use: Yes     Comment: occ    Drug use: Yes     Types: Marijuana     Comment: smoking and edibles daily for pain     Physical  Exam:  Vitals:    07/24/23 1105   BP: 105/69   Pulse: 75   Temp: 36.8 °C (98.2 °F)   SpO2: 94%     Body mass index is 40.6 kg/m².  Physical Exam  Constitutional:       General: She is not in acute distress.     Appearance: She is obese.   HENT:      Head: Normocephalic and atraumatic.      Right Ear: External ear normal.      Left Ear: External ear normal.   Eyes:      General: No scleral icterus.     Extraocular Movements: Extraocular movements intact.      Conjunctiva/sclera: Conjunctivae normal.   Cardiovascular:      Rate and Rhythm: Normal rate.      Heart sounds: Normal heart sounds. No murmur heard.  Pulmonary:      Effort: No respiratory distress.   Musculoskeletal:      Cervical back: Normal range of motion.      Right lower leg: No edema.      Left lower leg: No edema.   Skin:     General: Skin is warm and dry.   Neurological:      General: No focal deficit present.      Mental Status: She is alert.   Psychiatric:         Mood and Affect: Mood normal.         Behavior: Behavior normal.        Assessment/Plan:  1. Obesity, Class III, BMI 40-49.9 (morbid obesity) (HCC)  - previous referral for bariatric surgery, pt told by Dr. Hartman's office her insurance does not cover bariatric surgery  - counseled on healthy diet and regular exercise  - Referral to Nutrition Services  - HEMOGLOBIN A1C; Future  - TSH+FREE T4  - can discuss more about possible use of GLP-1 for weight loss at next visit.    2. History of sleep apnea  Patient states was told she had sleep apnea by sleep study that was performed as part of a pre bariatric checklist many years ago but states she never followed up with this. Not on pap therapy. States she does feel tired all the time, would wake up feeling tired. + snoring. Would wake up sometimes throughout the night.  - Referral to Pulmonary and Sleep Medicine    3. Other fatigue  May be secondary to untreated sleep apnea.   - will obtain other labs to assess for other etiologies  - CBC  WITHOUT DIFFERENTIAL; Future  - Comp Metabolic Panel; Future  - VITAMIN D,25 HYDROXY (DEFICIENCY); Future  - TSH+FREE T4    4. Screening for metabolic disorder  - HEMOGLOBIN A1C; Future    5. Gastroesophageal reflux disease without esophagitis  Pt reports previous egd showed Ferguson's esophagus and hiatal hernia. Patient has GI followup 8/3/23.  - need GI records   - she has been taking omeprazole daily instead.   - omeprazole (PRILOSEC) 20 MG delayed-release capsule; Take 1 Capsule by mouth 2 times a day.  Dispense: 60 Capsule; Refill: 2    6. Hyperlipidemia, unspecified hyperlipidemia type  Tolerating with no side effects, pt also taking fish oil  - rosuvastatin (CRESTOR) 20 MG Tab; Take 1 Tablet by mouth every evening.  Dispense: 30 Tablet; Refill: 5     All imaging results and lab results and consult notes are reviewed at this visit.  Followup: Return in about 1 month (around 8/24/2023).    Mar Henderson, DO  Internal Medicine PGY-3

## 2023-07-25 ENCOUNTER — HOSPITAL ENCOUNTER (OUTPATIENT)
Dept: LAB | Facility: MEDICAL CENTER | Age: 63
End: 2023-07-25
Payer: COMMERCIAL

## 2023-07-25 DIAGNOSIS — R53.83 OTHER FATIGUE: ICD-10-CM

## 2023-07-25 DIAGNOSIS — Z13.228 SCREENING FOR METABOLIC DISORDER: ICD-10-CM

## 2023-07-25 DIAGNOSIS — R53.82 CHRONIC FATIGUE: ICD-10-CM

## 2023-07-25 DIAGNOSIS — E66.01 OBESITY, CLASS III, BMI 40-49.9 (MORBID OBESITY) (HCC): ICD-10-CM

## 2023-07-25 LAB
25(OH)D3 SERPL-MCNC: 72 NG/ML (ref 30–100)
ALBUMIN SERPL BCP-MCNC: 4.2 G/DL (ref 3.2–4.9)
ALBUMIN/GLOB SERPL: 1.8 G/DL
ALP SERPL-CCNC: 49 U/L (ref 30–99)
ALT SERPL-CCNC: 18 U/L (ref 2–50)
ANION GAP SERPL CALC-SCNC: 8 MMOL/L (ref 7–16)
AST SERPL-CCNC: 16 U/L (ref 12–45)
BILIRUB SERPL-MCNC: 0.5 MG/DL (ref 0.1–1.5)
BUN SERPL-MCNC: 17 MG/DL (ref 8–22)
CALCIUM ALBUM COR SERPL-MCNC: 9.3 MG/DL (ref 8.5–10.5)
CALCIUM SERPL-MCNC: 9.5 MG/DL (ref 8.5–10.5)
CHLORIDE SERPL-SCNC: 104 MMOL/L (ref 96–112)
CO2 SERPL-SCNC: 26 MMOL/L (ref 20–33)
CREAT SERPL-MCNC: 0.87 MG/DL (ref 0.5–1.4)
ERYTHROCYTE [DISTWIDTH] IN BLOOD BY AUTOMATED COUNT: 46.8 FL (ref 35.9–50)
EST. AVERAGE GLUCOSE BLD GHB EST-MCNC: 111 MG/DL
GFR SERPLBLD CREATININE-BSD FMLA CKD-EPI: 76 ML/MIN/1.73 M 2
GLOBULIN SER CALC-MCNC: 2.3 G/DL (ref 1.9–3.5)
GLUCOSE SERPL-MCNC: 86 MG/DL (ref 65–99)
HBA1C MFR BLD: 5.5 % (ref 4–5.6)
HCT VFR BLD AUTO: 42 % (ref 37–47)
HGB BLD-MCNC: 13.6 G/DL (ref 12–16)
MCH RBC QN AUTO: 29.7 PG (ref 27–33)
MCHC RBC AUTO-ENTMCNC: 32.4 G/DL (ref 32.2–35.5)
MCV RBC AUTO: 91.7 FL (ref 81.4–97.8)
PLATELET # BLD AUTO: 238 K/UL (ref 164–446)
PMV BLD AUTO: 11.7 FL (ref 9–12.9)
POTASSIUM SERPL-SCNC: 4.8 MMOL/L (ref 3.6–5.5)
PROT SERPL-MCNC: 6.5 G/DL (ref 6–8.2)
RBC # BLD AUTO: 4.58 M/UL (ref 4.2–5.4)
SODIUM SERPL-SCNC: 138 MMOL/L (ref 135–145)
T4 FREE SERPL-MCNC: 0.94 NG/DL (ref 0.93–1.7)
TSH SERPL DL<=0.005 MIU/L-ACNC: 1.23 UIU/ML (ref 0.38–5.33)
WBC # BLD AUTO: 5.3 K/UL (ref 4.8–10.8)

## 2023-07-25 PROCEDURE — 36415 COLL VENOUS BLD VENIPUNCTURE: CPT

## 2023-07-25 PROCEDURE — 84443 ASSAY THYROID STIM HORMONE: CPT

## 2023-07-25 PROCEDURE — 80053 COMPREHEN METABOLIC PANEL: CPT

## 2023-07-25 PROCEDURE — 82306 VITAMIN D 25 HYDROXY: CPT

## 2023-07-25 PROCEDURE — 84439 ASSAY OF FREE THYROXINE: CPT

## 2023-07-25 PROCEDURE — 85027 COMPLETE CBC AUTOMATED: CPT

## 2023-07-25 PROCEDURE — 83036 HEMOGLOBIN GLYCOSYLATED A1C: CPT

## 2023-07-25 ASSESSMENT — ENCOUNTER SYMPTOMS
ABDOMINAL PAIN: 0
EYES NEGATIVE: 1
NAUSEA: 0
SHORTNESS OF BREATH: 0
NEUROLOGICAL NEGATIVE: 1
CHILLS: 0
VOMITING: 0
COUGH: 0
MYALGIAS: 0
FEVER: 0

## 2023-07-31 ENCOUNTER — APPOINTMENT (OUTPATIENT)
Dept: INTERNAL MEDICINE | Facility: OTHER | Age: 63
End: 2023-07-31
Payer: COMMERCIAL

## 2023-08-01 ENCOUNTER — OFFICE VISIT (OUTPATIENT)
Dept: INTERNAL MEDICINE | Facility: OTHER | Age: 63
End: 2023-08-01
Payer: COMMERCIAL

## 2023-08-01 VITALS — HEIGHT: 66 IN | SYSTOLIC BLOOD PRESSURE: 110 MMHG | BODY MASS INDEX: 39.38 KG/M2 | DIASTOLIC BLOOD PRESSURE: 72 MMHG

## 2023-08-01 DIAGNOSIS — Z23 ENCOUNTER FOR VACCINATION: ICD-10-CM

## 2023-08-01 DIAGNOSIS — R53.82 CHRONIC FATIGUE: ICD-10-CM

## 2023-08-01 DIAGNOSIS — E66.9 OBESITY (BMI 30-39.9): ICD-10-CM

## 2023-08-01 DIAGNOSIS — F43.9 STRESS AT HOME: ICD-10-CM

## 2023-08-01 DIAGNOSIS — Z86.69 HISTORY OF SLEEP APNEA: ICD-10-CM

## 2023-08-01 DIAGNOSIS — H33.21 RETINAL DETACHMENT, RIGHT: ICD-10-CM

## 2023-08-01 DIAGNOSIS — Z09 ENCOUNTER FOR FOLLOW-UP EXAMINATION: ICD-10-CM

## 2023-08-01 PROBLEM — R06.02 SHORTNESS OF BREATH: Status: RESOLVED | Noted: 2022-01-30 | Resolved: 2023-08-01

## 2023-08-01 PROCEDURE — 99213 OFFICE O/P EST LOW 20 MIN: CPT | Mod: GE | Performed by: STUDENT IN AN ORGANIZED HEALTH CARE EDUCATION/TRAINING PROGRAM

## 2023-08-01 PROCEDURE — 90677 PCV20 VACCINE IM: CPT | Performed by: INTERNAL MEDICINE

## 2023-08-01 PROCEDURE — 3078F DIAST BP <80 MM HG: CPT | Performed by: STUDENT IN AN ORGANIZED HEALTH CARE EDUCATION/TRAINING PROGRAM

## 2023-08-01 PROCEDURE — 90471 IMMUNIZATION ADMIN: CPT | Performed by: INTERNAL MEDICINE

## 2023-08-01 PROCEDURE — 3074F SYST BP LT 130 MM HG: CPT | Performed by: STUDENT IN AN ORGANIZED HEALTH CARE EDUCATION/TRAINING PROGRAM

## 2023-08-01 RX ORDER — BUPROPION HYDROCHLORIDE 150 MG/1
150 TABLET, EXTENDED RELEASE ORAL 2 TIMES DAILY
Qty: 60 TABLET | Status: CANCELLED | OUTPATIENT
Start: 2023-08-01

## 2023-08-01 ASSESSMENT — ENCOUNTER SYMPTOMS
PSYCHIATRIC NEGATIVE: 1
RESPIRATORY NEGATIVE: 1
EYES NEGATIVE: 1
MUSCULOSKELETAL NEGATIVE: 1
NEUROLOGICAL NEGATIVE: 1
GASTROINTESTINAL NEGATIVE: 1
CARDIOVASCULAR NEGATIVE: 1

## 2023-08-01 ASSESSMENT — FIBROSIS 4 INDEX: FIB4 SCORE: 0.95

## 2023-08-01 NOTE — PATIENT INSTRUCTIONS
"- Make an appointment the nutritionist in the clinic to develop a eating plan.  - Make an appointment with [facility]. The contact information is listed below:    [facility]  [address], [address]  [place] [address]  Phone: [phone]    - Follow-up in clinic in 4-5 weeks, or as needed.      Healthy Eating    Key Ideas    Diet, exercise, avoid smoking, drinking alcohol in moderation, not doing recreational drugs, managing (not avoiding) stress, and getting 7-9 hours of sleep per night are the most important things you can do to improve your health.     What you eat is much more important than how much.    Your goal should be a mostly vegetarian diet, with a few servings (approximately) 3) per week of fish (best) or meat.    The emphasis should be on eating food in its natural, unprocessed form.     Your weight boils down to a very simple equation: calories in minus calories out.    Eating health will give you more energy, help you think more clearly, prevent heart disease, strokes, and diabetes, and may improve your mood.    Eating healthy is not about eating perfectly all the time, it is about establishing overall healthy habits that you can maintain in life.    In general, you should be choosing foods that protect your blood vessels in your heart/brain and prevent your blood sugar from being very high (to prevent diabetes, or possibly reverse it if you have it).    The glycemic index and load are useful tools to help approximate how much a particular food will spike your blood sugar. You should aim for most of the food you can eat to have a glycemic index around 50 or below. If you exercise frequently (such as 5x a week, for 30 minutes each time), you may require more food around or slightly above the glycemic index of 50 cutoff. Helpful lists of glycemic indexes for common foods can be found by searching [employer] [facility] for either the \"[employer] Glycemic Index\" (website)or the " "\"Diabetes Care Glycemic Index\" (journal article - more detailed).    Organic foods without pesticides, antibiotics, and added growth hormones are preferable, although this is not always practical.     Avoiding foods with preservatives and artificial ingredients/chemicals is ideal. Wash fruits/vegetables before eating.    Foods that are high in omega 3 fatty acids (such as salmon, walnuts, and flax sees) are anti-inflammatory. Inflammation irritates the inside of blood vessels and makes it easier for a heart attack or stroke to occur. So, foods that contain omega 3 reduce inflammation and thus help prevent heart attacks and strokes. These foods may also help improve your mood and reduce anxiety and depression. You should try to include these foods in your diet. In contrast, foods that are high in omega 6 fatty acids and/or arachidonic acid (such as beef) may cause inflammation and thus may increase your risk of heart attack and stroke. Thus, these foods should be eaten sparingly.    Foods to Eat and Foods to Avoid    In general, you should eat a mixture of complex (low glycemic) carbohydrates, leafy green vegetables, broccoli, low glycemic index fruit, and a few servings per week of fish (ideal) or other animal meat.     Proteins:    Ideal -     [place] (should aim to eat 3x/week as has protective effects for the heart and likely for mood, although contains mercury so should not eat every day. Fish in general (contains mercury so do not eat every day), egg whites, beans (have some carbohydrates in them, but low glycemic index and mostly fiber, which slows sugar absorption and prevents sugar spikes), nuts (the nuts with the most data are walnuts, hazelnuts and almonds - eat a handful or two per day, probably protective against stroke).     Also acceptable -     Chicken breast with fat removed, Turkey breast with fat removed.    Eat sparingly -     Beef, Pork    Vegetables - eat as much as you want, unless on " "Coumadin/Warfarin - then avoid leafy greens)    Leafy greens (spinach, kale, cabbage, chard, bok buddy, carlito greens, etc), broccoli, cauliflower, zucchini/squash, asparagus, celery, onions, bamboo shoots, eggplant, etc.    Fruits    Low glycemic fruits - Eat in moderation: Peaches, pears, apples, strawberries, grapes, oranges, blueberries, tomatoes    Eat sparingly - very high in sugar: Melons (eg, watermelon, cantaloupe, etc), dates, bananas    Complex carbohydrates - in general, the longer you have to cook something, the less it will spike your blood sugar    Steel cut oats (best) or rolled oats (OK), pearled barley, brown rice (eat in moderation, contains arsenic), sweet potatoes/yams, pasta cooked al dente (still chewy), can be consumed sparingly (unless you are an athlete with high caloric needs, in which care you can eat more frequently), sprouted grains such as sprouted wheat in [employer], quinoa, etc. Breads where the first ingredient is a whole grain are still not ideal, but are better than \"enriched wheat\" or processed bread.     Eat sparingly - Quick oats, White bread, White rice (caution, contains arsenic), Potatoes    Eat sparingly In General - Candy, cakes, chocolate, pastries, high sugar foods, processed foods (such as lunch meats, have been linked to increased cancer(s)    Drinks    Ideal - Water is the best thing for you.     Non-fat/1% organic milk can be consumed in moderation - there is conflicting research about whether this increases your risk of heart disease or lowers it. Dairy products may also increase your risk of breast cancer.     [place]/soy-milk - can consider- soy has a high omega 6 (pro-inflammatory) content, although overall is a low sugar and high protein option    Sparingly consume -     Juices are very high in sugar - they are not the same thing as eating the whole fruit because they lack the fiber and your body does have to break them down as much.    Soda, regular soda has " "tons of sugar, diet soda has artificial chemicals which are not optimal for your body's functioning      A reasonable goal for the typical person without heart disease or strokes would be to allow 1-2 \"cheat meals\" per week where they can eat whatever they would like, with the rest of the meals strictly adhering to the healthy diet above.     "

## 2023-08-01 NOTE — PROGRESS NOTES
Teaching Physician Attestation      Level of Participation    I discussed with the resident physician the patient's history, exam, assessment and plan in detail.  Topics listed in my addendum were the focus of the visit.  Healthcare maintenance was not addressed this visit unless listed as a topic in my addendum.  I agree with plan as written along with the following additions/modifications:      Fatigue, likely multifactorial in the setting of BMI 39, situational stressors, also possible JANNY  -CBC, CMP, TSH, vitamin D are normal.  Patient reports not having enough energy to do what she would like to do, such as hiking with her friends.  Also reports frequent nocturnal arousals at home, and situational stressors related to her .  No SI/HI, feels safe at home.    Plan  -Healthy eating and anxiety resources handouts given.  -Referral to therapy for anxiety management, appreciate support  -Sleep study to evaluate for JANNY, appreciate support  -Discussed incremental approach to healthy dietary habit changes which can ultimately result in long-term sustained weight loss.  Possibility of nutrition referral and/or GLP-1 agonist per resident note.      Return to clinic 1 month for follow-up on above.

## 2023-08-01 NOTE — PROGRESS NOTES
"      Established Patient    Laverne presents today with the following:    CC: Follow-up, Lab results    HPI:     Ms. Riojas is a 62 year-old female who presents to [facility] for a follow-up.  Her last clinic encounter was on July 24th with Dr. Henderson. At this visit, weight loss goals were discussed, as well as fatigue.   She had lab work collected on July 25th that were all unremarkable.     At today's visit, she still has persistent symptoms of fatigue. States that she has been having increased levels of stress at home (reported to be caused by her spouse) for the last 3 years. Additionally, she reports poor sleep quality. States that she may get 6-8 hours of sleep at night, she does not feel well rested in the morning. Stating that she is tired in the morning, that persists throughout the day. She wakes up ~2-3x/night, regardless if her  is sleeping next to her or not. She denies waking up gasping for air. She does have a distant past history of JANNY, based off of a sleep study done ~10 years ago. She denies having a CPAP at home.   When she gets home from work in the evening, she states that she is unable to do any physical activity due to poor energy levels.   She additionally reports that she has been going to physical therapy since February of 2023. While it has improved her mobility and joint pain(s), she feels limited in her ability to exercise.   She continues to express desire to lose weight, requesting medication (Ozempic or Victoza). While she tries to have better eating habits, she reports that she is an \"emotional eater\", which results in her eating poorly.     Patient Active Problem List    Diagnosis Date Noted    Retinal detachment, right 08/01/2023    Chronic fatigue 05/09/2023    Gastroesophageal reflux disease without esophagitis 03/14/2023    Diverticulitis 02/23/2023    Chronic bilateral back pain 02/23/2023    Hypercholesteremia 02/02/2023    Postural dizziness with presyncope 02/01/2023 "    Hip pain, right 01/10/2023    Reactive airway disease 2022    Hemangioma of skin 2022    Right foot pain 2022    Obesity (BMI 30-39.9) 2022    Snoring 2017    Marijuana use 2017    Moderate episode of recurrent major depressive disorder (HCC) 2017     Social History     Tobacco Use    Smoking status: Former     Packs/day: 0.25     Years: 25.00     Pack years: 6.25     Types: Cigarettes     Quit date: 2002     Years since quittin.0    Smokeless tobacco: Never   Vaping Use    Vaping Use: Never used   Substance Use Topics    Alcohol use: Yes     Comment: occ    Drug use: Yes     Types: Marijuana     Comment: smoking and edibles daily for pain     Current Outpatient Medications   Medication Sig Dispense Refill    Omega-3 Fatty Acids (OMEGA-3 FISH OIL PO) Take 1 Tablet by mouth every day.      omeprazole (PRILOSEC) 20 MG delayed-release capsule Take 1 Capsule by mouth 2 times a day. 60 Capsule 2    rosuvastatin (CRESTOR) 20 MG Tab Take 1 Tablet by mouth every evening. 30 Tablet 5    methocarbamol (ROBAXIN) 500 MG Tab Take 1 Tablet by mouth 4 times a day as needed (muscle spasms. don't take within 8 hours of taking flexeril). 90 Tablet 2    albuterol 108 (90 Base) MCG/ACT Aero Soln inhalation aerosol Inhale 2 Puffs every four hours as needed for Shortness of Breath. 1 Each 0    vitamin D3 (CHOLECALCIFEROL) 1000 Unit (25 mcg) Tab Take 2,000 Units by mouth every day.       No current facility-administered medications for this visit.     Review of Systems   Constitutional:  Positive for malaise/fatigue.   HENT: Negative.     Eyes: Negative.    Respiratory: Negative.     Cardiovascular: Negative.    Gastrointestinal: Negative.    Genitourinary: Negative.    Musculoskeletal: Negative.    Skin: Negative.    Neurological: Negative.    Endo/Heme/Allergies: Negative.    Psychiatric/Behavioral: Negative.       /72 (BP Location: Left arm, Patient Position: Sitting, BP Cuff  "Size: Large adult)   Pulse (P) 87   Temp (P) 37.2 °C (99 °F)   Ht 1.676 m (5' 6\")   Wt (P) 110 kg (241 lb 12.8 oz)   SpO2 (P) 96%   BMI (P) 39.03 kg/m²     Physical Exam  Constitutional:       Appearance: She is obese.   HENT:      Head: Normocephalic and atraumatic.      Nose: Nose normal.      Mouth/Throat:      Mouth: Mucous membranes are moist.      Pharynx: Oropharynx is clear.   Eyes:      Conjunctiva/sclera: Conjunctivae normal.   Cardiovascular:      Rate and Rhythm: Normal rate and regular rhythm.      Pulses: Normal pulses.      Heart sounds: Normal heart sounds.   Pulmonary:      Effort: Pulmonary effort is normal.      Breath sounds: Normal breath sounds.   Abdominal:      General: Bowel sounds are normal.      Palpations: Abdomen is soft.   Musculoskeletal:         General: Normal range of motion.      Cervical back: Neck supple.   Skin:     General: Skin is warm and dry.      Capillary Refill: Capillary refill takes less than 2 seconds.   Neurological:      General: No focal deficit present.      Mental Status: She is alert and oriented to person, place, and time.   Psychiatric:         Mood and Affect: Mood normal.         Behavior: Behavior normal.       Note: I have reviewed all pertinent labs and diagnostic tests associated with this visit with specific comments listed under the assessment and plan below    Assessment and Plan    Ms. Riojas is a 62 year-old female who presents to [facility] for a follow-up.    1. Encounter for follow-up examination  Ms. Riojas presents to the clinic today for follow-up and to discuss lab results.  - See below respective A/P.  - Follow-up in clinic in 4-5 weeks, or as needed.     2. Chronic fatigue  3. Stress at home  4. History of sleep apnea  5. Obesity (BMI 30-39.9)  She has been having symptoms of fatigue and wanting to lose weight since her last clinic encounter. With her symptoms of fatigue, the etiologies could be due to stress disorder, obesity, or " underlying sleep apnea (or component of all three). Her STOP BANG score at today's visit, 3. On examination, Mallampati score of 1.   Her lab work did not reveal any evidence of metabolic derangements and/or blood loss. Will attempt to provide therapy/counseling to help manage her ongoing stressors. Will also attempt to have a PSG performed, so that if she does require CPAP aid at night, this could be ordered/provided and help allow quality rest.   - Relaxation techniques were discussed today. Handout given.  - Diet handout, also given.   - Referral to Behavioral Health placed.   - Polysomnography, 1-3; Future  - Diet and exercise was discussed at length today. Counseling provided at the importance of needed to modify lifestyle habits prior to initiating any medication and/or potential surgery(s). She notes that her insurance did not approve request for bariatric surgery evaluation.   - Dietician/Nutrition referral placed at today's visit. Goal is to develop a plan to provide adjustments to her diet and close monitoring.   - Follow-up in 4-5 weeks, or as needed.     6. Encounter for vaccination  - Pneumococcal Conjugate Vaccine 20-Valent (19 yrs+)      Followup: Return in about 4 weeks (around 8/29/2023), or if symptoms worsen or fail to improve.    Signed by:     Sarah Enrique MD  Internal Medicine PGY-3      Patient was discussed with Attending Physician

## 2023-08-03 ENCOUNTER — TELEPHONE (OUTPATIENT)
Dept: HEALTH INFORMATION MANAGEMENT | Facility: OTHER | Age: 63
End: 2023-08-03
Payer: COMMERCIAL

## 2023-10-10 ENCOUNTER — HOSPITAL ENCOUNTER (OUTPATIENT)
Dept: LAB | Facility: MEDICAL CENTER | Age: 63
End: 2023-10-10
Attending: NURSE PRACTITIONER
Payer: COMMERCIAL

## 2023-10-10 LAB
25(OH)D3 SERPL-MCNC: 40 NG/ML (ref 30–100)
ALBUMIN SERPL BCP-MCNC: 4.1 G/DL (ref 3.2–4.9)
ALBUMIN/GLOB SERPL: 1.6 G/DL
ALP SERPL-CCNC: 56 U/L (ref 30–99)
ALT SERPL-CCNC: 15 U/L (ref 2–50)
ANION GAP SERPL CALC-SCNC: 10 MMOL/L (ref 7–16)
AST SERPL-CCNC: 14 U/L (ref 12–45)
BILIRUB SERPL-MCNC: 0.5 MG/DL (ref 0.1–1.5)
BUN SERPL-MCNC: 15 MG/DL (ref 8–22)
CALCIUM ALBUM COR SERPL-MCNC: 8.9 MG/DL (ref 8.5–10.5)
CALCIUM SERPL-MCNC: 9 MG/DL (ref 8.5–10.5)
CHLORIDE SERPL-SCNC: 106 MMOL/L (ref 96–112)
CHOLEST SERPL-MCNC: 261 MG/DL (ref 100–199)
CO2 SERPL-SCNC: 26 MMOL/L (ref 20–33)
CREAT SERPL-MCNC: 0.97 MG/DL (ref 0.5–1.4)
ERYTHROCYTE [DISTWIDTH] IN BLOOD BY AUTOMATED COUNT: 44.9 FL (ref 35.9–50)
EST. AVERAGE GLUCOSE BLD GHB EST-MCNC: 111 MG/DL
ESTRADIOL SERPL-MCNC: <5 PG/ML
FASTING STATUS PATIENT QL REPORTED: NORMAL
FSH SERPL-ACNC: 39.6 MIU/ML
GFR SERPLBLD CREATININE-BSD FMLA CKD-EPI: 67 ML/MIN/1.73 M 2
GLOBULIN SER CALC-MCNC: 2.5 G/DL (ref 1.9–3.5)
GLUCOSE SERPL-MCNC: 86 MG/DL (ref 65–99)
HBA1C MFR BLD: 5.5 % (ref 4–5.6)
HCT VFR BLD AUTO: 44.4 % (ref 37–47)
HDLC SERPL-MCNC: 56 MG/DL
HGB BLD-MCNC: 14.2 G/DL (ref 12–16)
LDLC SERPL CALC-MCNC: 187 MG/DL
LH SERPL-ACNC: 23.4 IU/L
MCH RBC QN AUTO: 29.3 PG (ref 27–33)
MCHC RBC AUTO-ENTMCNC: 32 G/DL (ref 32.2–35.5)
MCV RBC AUTO: 91.5 FL (ref 81.4–97.8)
PLATELET # BLD AUTO: 266 K/UL (ref 164–446)
PMV BLD AUTO: 11.7 FL (ref 9–12.9)
POTASSIUM SERPL-SCNC: 4.5 MMOL/L (ref 3.6–5.5)
PROGEST SERPL-MCNC: 0.11 NG/ML
PROT SERPL-MCNC: 6.6 G/DL (ref 6–8.2)
RBC # BLD AUTO: 4.85 M/UL (ref 4.2–5.4)
SODIUM SERPL-SCNC: 142 MMOL/L (ref 135–145)
T3FREE SERPL-MCNC: 3.38 PG/ML (ref 2–4.4)
T4 FREE SERPL-MCNC: 1 NG/DL (ref 0.93–1.7)
T4 SERPL-MCNC: 6.1 UG/DL (ref 4–12)
TRIGL SERPL-MCNC: 91 MG/DL (ref 0–149)
TSH SERPL DL<=0.005 MIU/L-ACNC: 1.17 UIU/ML (ref 0.38–5.33)
WBC # BLD AUTO: 5.5 K/UL (ref 4.8–10.8)

## 2023-10-10 PROCEDURE — 84270 ASSAY OF SEX HORMONE GLOBUL: CPT

## 2023-10-10 PROCEDURE — 36415 COLL VENOUS BLD VENIPUNCTURE: CPT

## 2023-10-10 PROCEDURE — 84305 ASSAY OF SOMATOMEDIN: CPT

## 2023-10-10 PROCEDURE — 84443 ASSAY THYROID STIM HORMONE: CPT

## 2023-10-10 PROCEDURE — 82670 ASSAY OF TOTAL ESTRADIOL: CPT

## 2023-10-10 PROCEDURE — 84481 FREE ASSAY (FT-3): CPT

## 2023-10-10 PROCEDURE — 80053 COMPREHEN METABOLIC PANEL: CPT

## 2023-10-10 PROCEDURE — 86800 THYROGLOBULIN ANTIBODY: CPT

## 2023-10-10 PROCEDURE — 84439 ASSAY OF FREE THYROXINE: CPT

## 2023-10-10 PROCEDURE — 83036 HEMOGLOBIN GLYCOSYLATED A1C: CPT

## 2023-10-10 PROCEDURE — 83001 ASSAY OF GONADOTROPIN (FSH): CPT

## 2023-10-10 PROCEDURE — 82306 VITAMIN D 25 HYDROXY: CPT

## 2023-10-10 PROCEDURE — 80061 LIPID PANEL: CPT

## 2023-10-10 PROCEDURE — 84144 ASSAY OF PROGESTERONE: CPT

## 2023-10-10 PROCEDURE — 84403 ASSAY OF TOTAL TESTOSTERONE: CPT

## 2023-10-10 PROCEDURE — 83002 ASSAY OF GONADOTROPIN (LH): CPT

## 2023-10-10 PROCEDURE — 84402 ASSAY OF FREE TESTOSTERONE: CPT

## 2023-10-10 PROCEDURE — 85027 COMPLETE CBC AUTOMATED: CPT

## 2023-10-12 LAB — THYROGLOB AB SERPL-ACNC: <0.9 IU/ML (ref 0–4)

## 2023-10-13 LAB
IGF-I SERPL-MCNC: 148 NG/ML (ref 43–241)
IGF-I Z-SCORE SERPL: 0.6

## 2023-10-16 LAB
SHBG SERPL-SCNC: 41 NMOL/L (ref 17–125)
TESTOST FREE SERPL-MCNC: 2.2 PG/ML (ref 0.6–3.8)
TESTOST SERPL-MCNC: 15 NG/DL (ref 5–32)

## 2023-12-20 ENCOUNTER — TELEPHONE (OUTPATIENT)
Dept: INTERNAL MEDICINE | Facility: OTHER | Age: 63
End: 2023-12-20
Payer: COMMERCIAL

## 2023-12-20 NOTE — TELEPHONE ENCOUNTER
Patient called stating her and her physical therapy office have been trying to reach us for multiple days trying to renew her referral.    She states [facility] will need the referral backdated but she is not sure of the specific date.   She said we will need to call their office or look at the fax that has been sent to know what date is needed

## 2024-01-03 NOTE — TELEPHONE ENCOUNTER
Phone Number Called: [phone]    Call outcome:  Spoke with representative at [facility]    Message: Spoke with representative and informed her that patient states they have been faxing us orders for weeks now that have needed to be signed. I was informed that the patient's PCP listed at [facility] is Dr. De Oliveira. I informed her that Dr. De Oliveira no longer practices at this office and updated the information for current PCP and provided them with the correct fax number. Representative stated they will fax the two orders that need to be signed and they just need to be faxed back as soon as possible.

## 2024-04-15 ENCOUNTER — TELEPHONE (OUTPATIENT)
Dept: INTERNAL MEDICINE | Facility: OTHER | Age: 64
End: 2024-04-15
Payer: COMMERCIAL

## 2024-04-15 DIAGNOSIS — E66.01 OBESITY, CLASS III, BMI 40-49.9 (MORBID OBESITY) (HCC): ICD-10-CM

## 2024-04-15 DIAGNOSIS — E66.9 OBESITY (BMI 30-39.9): ICD-10-CM

## 2024-04-15 NOTE — TELEPHONE ENCOUNTER
-Phone Number Called: [phone]  -  Call outcome: Spoke to patient regarding message below.    Message: Pt called back and wanted to know if we can get a new referral sent for batriatric surgery to [facility]. Pt scheduled an appt with a provider next month, but wanted to know if she can get it sooner.

## 2024-04-15 NOTE — TELEPHONE ENCOUNTER
VOICEMAIL  1. Caller Name: Laverne Riojas                      Call Back Number: [phone] (home)     2. Message: Pt LVM wanting a referral for batriatric surgery - however old referral was written by Dr. De Oliveira who isn't here anymore. Suggested to pt to schedule a follow up with PCP or any provider for this. Returned VM. 1st attempt    3. Patient approves office to leave a detailed voicemail/MyChart message: N\A

## 2024-05-13 ENCOUNTER — APPOINTMENT (OUTPATIENT)
Dept: INTERNAL MEDICINE | Facility: OTHER | Age: 64
End: 2024-05-13
Payer: COMMERCIAL

## 2025-02-11 ENCOUNTER — OFFICE VISIT (OUTPATIENT)
Dept: INTERNAL MEDICINE | Facility: OTHER | Age: 65
End: 2025-02-11
Payer: COMMERCIAL

## 2025-02-11 VITALS
TEMPERATURE: 97.4 F | OXYGEN SATURATION: 96 % | WEIGHT: 250 LBS | HEIGHT: 66 IN | BODY MASS INDEX: 40.18 KG/M2 | SYSTOLIC BLOOD PRESSURE: 129 MMHG | DIASTOLIC BLOOD PRESSURE: 77 MMHG | HEART RATE: 73 BPM

## 2025-02-11 DIAGNOSIS — Z12.31 SCREENING MAMMOGRAM FOR BREAST CANCER: ICD-10-CM

## 2025-02-11 DIAGNOSIS — G89.29 CHRONIC BILATERAL BACK PAIN, UNSPECIFIED BACK LOCATION: ICD-10-CM

## 2025-02-11 DIAGNOSIS — E66.01 CLASS 3 SEVERE OBESITY WITH SERIOUS COMORBIDITY AND BODY MASS INDEX (BMI) OF 40.0 TO 44.9 IN ADULT, UNSPECIFIED OBESITY TYPE (HCC): ICD-10-CM

## 2025-02-11 DIAGNOSIS — M25.551 CHRONIC RIGHT HIP PAIN: ICD-10-CM

## 2025-02-11 DIAGNOSIS — E66.813 CLASS 3 SEVERE OBESITY WITH SERIOUS COMORBIDITY AND BODY MASS INDEX (BMI) OF 40.0 TO 44.9 IN ADULT, UNSPECIFIED OBESITY TYPE (HCC): ICD-10-CM

## 2025-02-11 DIAGNOSIS — E78.5 DYSLIPIDEMIA: ICD-10-CM

## 2025-02-11 DIAGNOSIS — Z11.3 SCREENING EXAMINATION FOR STD (SEXUALLY TRANSMITTED DISEASE): ICD-10-CM

## 2025-02-11 DIAGNOSIS — G89.29 CHRONIC RIGHT HIP PAIN: ICD-10-CM

## 2025-02-11 DIAGNOSIS — M54.9 CHRONIC BILATERAL BACK PAIN, UNSPECIFIED BACK LOCATION: ICD-10-CM

## 2025-02-11 DIAGNOSIS — E66.01 OBESITY, CLASS III, BMI 40-49.9 (MORBID OBESITY) (HCC): ICD-10-CM

## 2025-02-11 DIAGNOSIS — Z13.228 SCREENING FOR METABOLIC DISORDER: ICD-10-CM

## 2025-02-11 DIAGNOSIS — Z86.39 HISTORY OF THYROID DISEASE: ICD-10-CM

## 2025-02-11 PROBLEM — R06.83 SNORING: Status: RESOLVED | Noted: 2017-07-14 | Resolved: 2025-02-11

## 2025-02-11 PROBLEM — R42 POSTURAL DIZZINESS WITH PRESYNCOPE: Status: RESOLVED | Noted: 2023-02-01 | Resolved: 2025-02-11

## 2025-02-11 PROBLEM — R55 POSTURAL DIZZINESS WITH PRESYNCOPE: Status: RESOLVED | Noted: 2023-02-01 | Resolved: 2025-02-11

## 2025-02-11 PROBLEM — F12.90 MARIJUANA USE: Status: RESOLVED | Noted: 2017-07-14 | Resolved: 2025-02-11

## 2025-02-11 PROBLEM — F33.1 MODERATE EPISODE OF RECURRENT MAJOR DEPRESSIVE DISORDER (HCC): Status: RESOLVED | Noted: 2017-07-14 | Resolved: 2025-02-11

## 2025-02-11 PROBLEM — H33.21 RETINAL DETACHMENT, RIGHT: Status: RESOLVED | Noted: 2023-08-01 | Resolved: 2025-02-11

## 2025-02-11 PROBLEM — J45.909 REACTIVE AIRWAY DISEASE: Status: RESOLVED | Noted: 2022-12-19 | Resolved: 2025-02-11

## 2025-02-11 PROBLEM — R53.82 CHRONIC FATIGUE: Status: RESOLVED | Noted: 2023-05-09 | Resolved: 2025-02-11

## 2025-02-11 PROBLEM — D18.01 HEMANGIOMA OF SKIN: Status: RESOLVED | Noted: 2022-01-30 | Resolved: 2025-02-11

## 2025-02-11 PROCEDURE — 99214 OFFICE O/P EST MOD 30 MIN: CPT | Mod: GC

## 2025-02-11 PROCEDURE — 3078F DIAST BP <80 MM HG: CPT | Mod: GC

## 2025-02-11 PROCEDURE — 3074F SYST BP LT 130 MM HG: CPT | Mod: GC

## 2025-02-11 RX ORDER — ESOMEPRAZOLE MAGNESIUM 40 MG/1
40 CAPSULE, DELAYED RELEASE ORAL
COMMUNITY

## 2025-02-11 ASSESSMENT — PATIENT HEALTH QUESTIONNAIRE - PHQ9
SUM OF ALL RESPONSES TO PHQ9 QUESTIONS 1 AND 2: 0
3. TROUBLE FALLING OR STAYING ASLEEP OR SLEEPING TOO MUCH: NOT AT ALL
1. LITTLE INTEREST OR PLEASURE IN DOING THINGS: NOT AT ALL
5. POOR APPETITE OR OVEREATING: NOT AT ALL
SUM OF ALL RESPONSES TO PHQ QUESTIONS 1-9: 0
9. THOUGHTS THAT YOU WOULD BE BETTER OFF DEAD, OR OF HURTING YOURSELF: NOT AT ALL
2. FEELING DOWN, DEPRESSED, IRRITABLE, OR HOPELESS: NOT AT ALL
8. MOVING OR SPEAKING SO SLOWLY THAT OTHER PEOPLE COULD HAVE NOTICED. OR THE OPPOSITE, BEING SO FIGETY OR RESTLESS THAT YOU HAVE BEEN MOVING AROUND A LOT MORE THAN USUAL: NOT AT ALL
6. FEELING BAD ABOUT YOURSELF - OR THAT YOU ARE A FAILURE OR HAVE LET YOURSELF OR YOUR FAMILY DOWN: NOT AL ALL
7. TROUBLE CONCENTRATING ON THINGS, SUCH AS READING THE NEWSPAPER OR WATCHING TELEVISION: NOT AT ALL
4. FEELING TIRED OR HAVING LITTLE ENERGY: NOT AT ALL

## 2025-02-11 ASSESSMENT — PAIN SCALES - GENERAL: PAINLEVEL_OUTOF10: 2=MINIMAL-SLIGHT

## 2025-02-11 ASSESSMENT — FIBROSIS 4 INDEX: FIB4 SCORE: 0.84

## 2025-02-11 NOTE — PROGRESS NOTES
Chief Complaint   Patient presents with    Exposure to STD    Hip Pain     HISTORY OF PRESENT ILLNESS: Patient is a 64 y.o. female established patient of Dr. Enrique.  Patient has past medical history of chronic back pain, chronic right hip pain, history of GERD, diverticulitis follows with gastroenterology, previous history of marijuana use, obesity with BMI of 40.35.    The following were discussed as noted below    1. Screening examination for STD (sexually transmitted disease)  -Patient is requesting for STD screening.  Patient had last sexual contact with her  about 1 month ago with no condom use.  She only has 1 sexual partner, there is concerned that her  might have cheated on her.  Has been without any diagnosis of STD as far she knows.  She denies any symptoms denies any vaginal discharge    2. Chronic right hip pain   Chronic bilateral back pain, unspecified back location  Patient with chronic right hip pain for years.  She had a previous imaging, x-ray of bilateral hips which showed bilateral hip and sacroiliac osteoarthritis.  Patient describes right hip pain as sharp, 7/10, intermittently felt.  Pain is aggravated upon movement, and partially relieved with rest.  She has tried to take Tylenol with no relief of symptoms.  She is not using any NSAIDs as this is contraindicated with her diverticulitis.Also states chronic history of back pain without any radiculopathy. No urinary or bowel incontinence, no unwanted weight loss, no fever.     3. History of thyroid disease   Patient is requesting for test for her thyroid.  States that about 3 years ago she has been on thyroid supplementation because her thyroid was off.  She stopped taking thyroid hormones and is concerned.  She denies any hair or skin changes, she has history of chronic fatigue as noted in chart review.  TSH reviewed on file and all were within normal limit.    4. Dyslipidemia  -Patient had lipid panel done last 2023 showing  cholesterol at , triglycerides 91, HDL 56, . Review of chart seems like patient was prescribed with Rosuvastatin by previous provider but she did not take medication. Would like to recheck labs.       Past Medical History:   Diagnosis Date    Chronic fatigue 2023    Depression     Hemangioma of skin 2022    Marijuana use 2017    Moderate episode of recurrent major depressive disorder (HCC) 2017    Obesity     Postural dizziness with presyncope 2023    Reactive airway disease 2022    Retinal detachment, right 2023    Snoring 2017       Patient Active Problem List    Diagnosis Date Noted    Gastroesophageal reflux disease without esophagitis 2023    Diverticulitis 2023    Chronic bilateral back pain 2023    Hypercholesteremia 2023    Hip pain, right 01/10/2023    Right foot pain 2022    Obesity (BMI 30-39.9) 2022     Patient has no known allergies.    Current Outpatient Medications   Medication Sig Dispense Refill    esomeprazole (NEXIUM) 40 MG delayed-release capsule Take 40 mg by mouth every morning before breakfast.      Omega-3 Fatty Acids (OMEGA-3 FISH OIL PO) Take 1 Tablet by mouth every day.      methocarbamol (ROBAXIN) 500 MG Tab Take 1 Tablet by mouth 4 times a day as needed (muscle spasms. don't take within 8 hours of taking flexeril). 90 Tablet 2    albuterol 108 (90 Base) MCG/ACT Aero Soln inhalation aerosol Inhale 2 Puffs every four hours as needed for Shortness of Breath. 1 Each 0    vitamin D3 (CHOLECALCIFEROL) 1000 Unit (25 mcg) Tab Take 2,000 Units by mouth every day.       No current facility-administered medications for this visit.       Social History     Tobacco Use    Smoking status: Former     Current packs/day: 0.00     Average packs/day: 0.3 packs/day for 25.0 years (6.3 ttl pk-yrs)     Types: Cigarettes     Start date: 1977     Quit date: 2002     Years since quittin.5    Smokeless  "tobacco: Never   Vaping Use    Vaping status: Never Used   Substance Use Topics    Alcohol use: Yes     Comment: occ    Drug use: Yes     Types: Marijuana     Comment: smoking and edibles daily for pain       Family History   Problem Relation Age of Onset    Arthritis Mother     Stroke Mother     Sleep Apnea Mother     Sleep Apnea Brother     Cancer Maternal Uncle        Review of Systems   Constitutional:  Negative for malaise/fatigue.   HENT: Negative.     Eyes: Negative.    Respiratory: Negative.     Cardiovascular: Negative.    Gastrointestinal: Negative.    Genitourinary: Negative.    Musculoskeletal:         See HPI   Skin: Negative.    Neurological: Negative.    Endo/Heme/Allergies: Negative.    Psychiatric/Behavioral: Negative.         Exam:  /77 (BP Location: Right arm, Patient Position: Sitting, BP Cuff Size: Large adult long)   Pulse 73   Temp 36.3 °C (97.4 °F) (Temporal)   Ht 1.676 m (5' 6\")   Wt 113 kg (250 lb)   SpO2 96%  Body mass index is 40.35 kg/m².    Constitutional:  Not in acute distress, well appearing.  HEENT:   NC/AT  Cardiovascular: Regular rate and rhythm. No murmurs or gallops.      Lungs:   Clear to auscultation bilaterally. No wheezes or crackles. No respiratory distress.  Abdomen: Not distended, soft, not tender. No guarding or rigidity. No masses.  Extremities:  No cyanosis/clubbing/edema. No obvious deformities.No spinal tenderness. Tenderness on right great trochanter and surrounding areas  Skin:  Warm and dry.  No visible rashes.  Neurologic: Alert & oriented x 3, CN II-XII grossly intact, strength and sensation grossly intact.  No focal deficits noted.  Psychiatric:  Affect normal, mood normal, judgment normal.    Assessment/Plan:     1. Screening examination for STD (sexually transmitted disease)  -asymptomatic, safe sexual practices discuused  - HIV AG/AB Combo Assay Screening; Future  - Trichomonas Vaginalis by TMA  - Hepatitis C Virus Antibody; Future  - HEP B " Surface Antibody; Future  - Hep B Core AB Total; Future  - Hep B Surface Antigen; Future  - Chlamydia/GC, PCR (Urine); Future  - RPR (SYPHILIS); Future    2. Chronic right hip pain  -patient with known osteoarthritis of bilateral hip from previous xray  -patient underwent physical therapy and failed other conservative management and symptomatic treatment  -Need to assess for complications  - MR-HIP-W/O; Future  - Referral to Physical Therapy placed and patient willing to try again. Discussed need for weight loss. She was previously referred to bariatric surgery however was denied by insurance    3. Chronic bilateral back pain, unspecified back location  -no red flag signs. No indication for repeat imaging, Patient with known sacroiliac osteoarthritis from previous Xray. (2023)  -conservative management discussed and application of hot compress, lidocaine patch or diclofenac gel  - Referral to Physical Therapy     4. Dyslipidemia  -repeat lipid panel pending  - Lipid Profile; Future    5. Screening for metabolic disorder  - HEMOGLOBIN A1C; Future    6. History of thyroid disease  -reports thyroid disease/hypothyroidism previously on supplementation  - TSH WITH REFLEX TO FT4; Future    To be further discussed on next visit:   #Obesity BMI 40.35  -Previously referred by PCP for gastric bypass surgery however this was declined by insurance  #screening mammogram  -order placed for mammogram  # chronic right foot pain  -no sensory or motor deficits  #other preventive care    All imaging results and lab results and consult notes are reviewed at this visit.  Followup: Return in about 4 weeks (around 3/11/2025).    Please note that this dictation was created using voice recognition software. I have made every reasonable attempt to correct obvious errors, but I expect that there are errors of grammar and possibly content that I did not discover before finalizing the note.    ZACKARY OTOOLE MD  PGY-3

## 2025-02-12 ASSESSMENT — ENCOUNTER SYMPTOMS
NEUROLOGICAL NEGATIVE: 1
CARDIOVASCULAR NEGATIVE: 1
GASTROINTESTINAL NEGATIVE: 1
EYES NEGATIVE: 1
PSYCHIATRIC NEGATIVE: 1
RESPIRATORY NEGATIVE: 1

## 2025-02-14 NOTE — Clinical Note
REFERRAL APPROVAL NOTICE         Sent on February 13, 2025                   Laverne Riojas  [address] [place] [address]                   Dear MsPhani Shine,    After a careful review of the medical information and benefit coverage, [facility] has processed your referral. See below for additional details.    If applicable, you must be actively enrolled with your insurance for coverage of the authorized service. If you have any questions regarding your coverage, please contact your insurance directly.    REFERRAL INFORMATION   Referral #:  [id number]  Referred-To Department    Referred-By Provider:  Physical Therapy    Elsa Matute M.D.   [facility] [address]      [address]  [place] [address]  [phone] [address]  [address]  [place] [id number] [phone]    Referral Start Date:  02/11/2025  Referral End Date:   02/11/2026             SCHEDULING  If you do not already have an appointment, please call [phone] to make an appointment.     MORE INFORMATION  If you do not already have a [employer] account, sign up at: [email or web address]  You can access your medical information, make appointments, see lab results, billing information, and more.  If you have questions regarding this referral, please contact  the [facility] Referrals department at:             [phone]. Monday - Friday 8:00AM - 5:00PM.     Sincerely,    [facility]

## 2025-02-18 ENCOUNTER — HOSPITAL ENCOUNTER (OUTPATIENT)
Dept: LAB | Facility: MEDICAL CENTER | Age: 65
End: 2025-02-18
Payer: COMMERCIAL

## 2025-02-18 DIAGNOSIS — E78.5 DYSLIPIDEMIA: ICD-10-CM

## 2025-02-18 DIAGNOSIS — Z11.3 SCREENING EXAMINATION FOR STD (SEXUALLY TRANSMITTED DISEASE): ICD-10-CM

## 2025-02-18 DIAGNOSIS — Z86.39 HISTORY OF THYROID DISEASE: ICD-10-CM

## 2025-02-18 DIAGNOSIS — Z13.228 SCREENING FOR METABOLIC DISORDER: ICD-10-CM

## 2025-02-18 LAB
ALBUMIN SERPL BCP-MCNC: 3.9 G/DL (ref 3.2–4.9)
ALBUMIN/GLOB SERPL: 1.4 G/DL
ALP SERPL-CCNC: 55 U/L (ref 30–99)
ALT SERPL-CCNC: 17 U/L (ref 2–50)
ANION GAP SERPL CALC-SCNC: 10 MMOL/L (ref 7–16)
AST SERPL-CCNC: 21 U/L (ref 12–45)
BILIRUB SERPL-MCNC: 0.4 MG/DL (ref 0.1–1.5)
BUN SERPL-MCNC: 14 MG/DL (ref 8–22)
CALCIUM ALBUM COR SERPL-MCNC: 9.5 MG/DL (ref 8.5–10.5)
CALCIUM SERPL-MCNC: 9.4 MG/DL (ref 8.5–10.5)
CHLORIDE SERPL-SCNC: 108 MMOL/L (ref 96–112)
CHOLEST SERPL-MCNC: 237 MG/DL (ref 100–199)
CO2 SERPL-SCNC: 24 MMOL/L (ref 20–33)
CREAT SERPL-MCNC: 0.96 MG/DL (ref 0.5–1.4)
EST. AVERAGE GLUCOSE BLD GHB EST-MCNC: 108 MG/DL
GFR SERPLBLD CREATININE-BSD FMLA CKD-EPI: 67 ML/MIN/1.73 M 2
GLOBULIN SER CALC-MCNC: 2.7 G/DL (ref 1.9–3.5)
GLUCOSE SERPL-MCNC: 76 MG/DL (ref 65–99)
HBA1C MFR BLD: 5.4 % (ref 4–5.6)
HBV CORE AB SERPL QL IA: NONREACTIVE
HBV SURFACE AB SERPL IA-ACNC: <3.5 MIU/ML (ref 0–10)
HBV SURFACE AG SER QL: NORMAL
HCV AB SER QL: NORMAL
HDLC SERPL-MCNC: 53 MG/DL
HIV 1+2 AB+HIV1 P24 AG SERPL QL IA: NORMAL
LDLC SERPL CALC-MCNC: 165 MG/DL
POTASSIUM SERPL-SCNC: 4.5 MMOL/L (ref 3.6–5.5)
PROT SERPL-MCNC: 6.6 G/DL (ref 6–8.2)
SODIUM SERPL-SCNC: 142 MMOL/L (ref 135–145)
T PALLIDUM AB SER QL IA: NORMAL
TRIGL SERPL-MCNC: 97 MG/DL (ref 0–149)
TSH SERPL DL<=0.005 MIU/L-ACNC: 2.09 UIU/ML (ref 0.38–5.33)

## 2025-02-18 PROCEDURE — 84443 ASSAY THYROID STIM HORMONE: CPT

## 2025-02-18 PROCEDURE — 80053 COMPREHEN METABOLIC PANEL: CPT

## 2025-02-18 PROCEDURE — 86704 HEP B CORE ANTIBODY TOTAL: CPT

## 2025-02-18 PROCEDURE — 86780 TREPONEMA PALLIDUM: CPT

## 2025-02-18 PROCEDURE — 36415 COLL VENOUS BLD VENIPUNCTURE: CPT

## 2025-02-18 PROCEDURE — 86706 HEP B SURFACE ANTIBODY: CPT

## 2025-02-18 PROCEDURE — 87389 HIV-1 AG W/HIV-1&-2 AB AG IA: CPT

## 2025-02-18 PROCEDURE — 83036 HEMOGLOBIN GLYCOSYLATED A1C: CPT

## 2025-02-18 PROCEDURE — 80061 LIPID PANEL: CPT

## 2025-02-18 PROCEDURE — 87340 HEPATITIS B SURFACE AG IA: CPT

## 2025-02-18 PROCEDURE — 86803 HEPATITIS C AB TEST: CPT

## 2025-02-21 ENCOUNTER — RESULTS FOLLOW-UP (OUTPATIENT)
Dept: INTERNAL MEDICINE | Facility: OTHER | Age: 65
End: 2025-02-21

## 2025-03-03 ENCOUNTER — HOSPITAL ENCOUNTER (OUTPATIENT)
Dept: RADIOLOGY | Facility: MEDICAL CENTER | Age: 65
End: 2025-03-03
Payer: COMMERCIAL

## 2025-03-03 DIAGNOSIS — Z12.31 SCREENING MAMMOGRAM FOR BREAST CANCER: ICD-10-CM

## 2025-03-03 PROCEDURE — 77067 SCR MAMMO BI INCL CAD: CPT

## 2025-03-11 ENCOUNTER — HOSPITAL ENCOUNTER (OUTPATIENT)
Dept: RADIOLOGY | Facility: MEDICAL CENTER | Age: 65
End: 2025-03-11
Payer: COMMERCIAL

## 2025-03-11 DIAGNOSIS — M25.551 CHRONIC RIGHT HIP PAIN: ICD-10-CM

## 2025-03-11 DIAGNOSIS — G89.29 CHRONIC RIGHT HIP PAIN: ICD-10-CM

## 2025-03-11 PROCEDURE — 73721 MRI JNT OF LWR EXTRE W/O DYE: CPT

## 2025-03-24 ENCOUNTER — OFFICE VISIT (OUTPATIENT)
Dept: INTERNAL MEDICINE | Facility: OTHER | Age: 65
End: 2025-03-24
Payer: COMMERCIAL

## 2025-03-24 VITALS
BODY MASS INDEX: 39.86 KG/M2 | WEIGHT: 248 LBS | HEART RATE: 67 BPM | SYSTOLIC BLOOD PRESSURE: 121 MMHG | OXYGEN SATURATION: 95 % | HEIGHT: 66 IN | TEMPERATURE: 97.7 F | DIASTOLIC BLOOD PRESSURE: 79 MMHG

## 2025-03-24 DIAGNOSIS — E66.01 CLASS 3 SEVERE OBESITY WITH BODY MASS INDEX (BMI) OF 40.0 TO 44.9 IN ADULT, UNSPECIFIED OBESITY TYPE, UNSPECIFIED WHETHER SERIOUS COMORBIDITY PRESENT (HCC): ICD-10-CM

## 2025-03-24 DIAGNOSIS — G89.29 CHRONIC LOW BACK PAIN WITHOUT SCIATICA, UNSPECIFIED BACK PAIN LATERALITY: ICD-10-CM

## 2025-03-24 DIAGNOSIS — E66.813 CLASS 3 SEVERE OBESITY WITH BODY MASS INDEX (BMI) OF 40.0 TO 44.9 IN ADULT, UNSPECIFIED OBESITY TYPE, UNSPECIFIED WHETHER SERIOUS COMORBIDITY PRESENT (HCC): ICD-10-CM

## 2025-03-24 DIAGNOSIS — G62.9 NEUROPATHY: ICD-10-CM

## 2025-03-24 DIAGNOSIS — G89.29 CHRONIC RIGHT HIP PAIN: ICD-10-CM

## 2025-03-24 DIAGNOSIS — R07.89 CHEST TIGHTNESS: ICD-10-CM

## 2025-03-24 DIAGNOSIS — E78.5 DYSLIPIDEMIA: ICD-10-CM

## 2025-03-24 DIAGNOSIS — M54.50 CHRONIC LOW BACK PAIN WITHOUT SCIATICA, UNSPECIFIED BACK PAIN LATERALITY: ICD-10-CM

## 2025-03-24 DIAGNOSIS — M25.551 CHRONIC RIGHT HIP PAIN: ICD-10-CM

## 2025-03-24 RX ORDER — TIRZEPATIDE 2.5 MG/.5ML
2.5 INJECTION, SOLUTION SUBCUTANEOUS
Qty: 3 ML | Refills: 1 | Status: SHIPPED | OUTPATIENT
Start: 2025-03-24

## 2025-03-24 ASSESSMENT — FIBROSIS 4 INDEX: FIB4 SCORE: 1.19

## 2025-03-24 NOTE — PROGRESS NOTES
Chief Complaint   Patient presents with    Lab Results     In chart     Other     - tightening in chest area and racing heartbeat for about the past month      HISTORY OF PRESENT ILLNESS: Patient is a pleasant 64 y.o. female established patient with past medical history of chronic back pain, chronic right hip pain, history of GERD, diverticulitis follows with gastroenterology, previous history of marijuana use, obesity with BMI of 40.35.     Patient is here for follow-up, the following were discussed as noted below:     Neuropathy on fourth digit of right hand  Patient notes about 2-week history of numbness on the right fourth digit, localized on the mid phalangeal area upwards(superior half of fourth digit).  She otherwise denies any motor or sensory deficit.  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  She denies any history of injury or trauma to the area.  She does not have any problems with gripping objects however states that this has caused significant distress in her work.  She denies any history of excessive alcohol use, denies any history of vitamin deficiency, no known electrolyte abnormality in the past.     2.  Chest tightness  -Patient states that she has been having chest tightness, first onset was about 2 months ago.  She denies any pain, diaphoresis or crushing sensation.  Describes that she mostly feels a sensation around her upper chest area along the area of her neck, but states that she also feels this on her anterior chest area at times.  States that the episode will last around 5 to 10 seconds and would resolve spontaneously.  This is not associated with any shortness of breath but states that sometimes it would be hard for her to catch her breath when these episodes happen.  Patient denies any numbness or radiation of symptoms.  Symptoms are relatively infrequent. Denies any pleuritic chest pain.   "Episodes would happen every time she is at work, she mostly attributes it to be related to increasing anxiety and stress and also from the demands of her work.  No specific relieving factor as these episodes would resolve spontaneously without any intervention.  She has history of GERD and is on esomeprazole 40 mg daily and is being followed by gastroenterology(has appointment next month). Patient otherwise denies any history of MI, PE. She does not think this is musculoskeletal in nature stating that \" is not that kind of pain\".  Thinks that there might be a component of stress as above given life stressors of relationship problem with  and possibly getting .    3.  Chronic hip pain  Chronic back pain  -Patient with chronic right hip pain for years.  She had a previous imaging, x-ray of bilateral hips which showed bilateral hip and sacroiliac osteoarthritis.  Patient describes right hip pain as sharp, 7/10, intermittently felt.  Pain is aggravated upon movement, and partially relieved with rest.  She has tried to take Tylenol with no relief of symptoms.  She is not using any NSAIDs as this is contraindicated with her diverticulitis.Also states chronic history of back pain without any radiculopathy. No urinary or bowel incontinence, no unwanted weight loss, no fever.  During last visit, MRI of the right hip was done to assess for complications.  This revealed moderate lumbosacral junction facet arthropathy, mild degenerative disc disease and mild right hip and SI joint osteoarthritis.  The symptoms are relatively stable.  She was previously referred to physical therapy and states she is just awaiting results of imaging.  States that she will make an appointment with physical therapy this month.  She is still able to go to work.      4.  Obesity  -Patient with BMI 40.03.  Patient with hemoglobin A1c of 5.4%, previous hemoglobin A1c at 5.5.  Patient states that she would like to be prescribed with " pharmacotherapy, specifically states that she was on that she was on Zepbound before.  Also states that her friends have been on this specifically for weight loss, not for diabetes.  She would like to try this, however is also amenable for nutrition referral.  She is working on healthy diet as well as getting regular exercise    Past Medical History:   Diagnosis Date    Chronic fatigue 05/09/2023    Depression     Hemangioma of skin 01/30/2022    Marijuana use 07/14/2017    Moderate episode of recurrent major depressive disorder (HCC) 07/14/2017    Obesity     Postural dizziness with presyncope 02/01/2023    Reactive airway disease 12/19/2022    Retinal detachment, right 08/01/2023    Snoring 07/14/2017       Patient Active Problem List    Diagnosis Date Noted    Gastroesophageal reflux disease without esophagitis 03/14/2023    Diverticulitis 02/23/2023    Chronic bilateral back pain 02/23/2023    Hypercholesteremia 02/02/2023    Hip pain, right 01/10/2023    Right foot pain 01/30/2022    Obesity (BMI 30-39.9) 01/28/2022       Patient has no known allergies.    Current Outpatient Medications   Medication Sig Dispense Refill    Tirzepatide (MOUNJARO) 2.5 MG/0.5ML Solution Auto-injector Inject 2.5 mg under the skin every 7 days. 3 mL 1    esomeprazole (NEXIUM) 40 MG delayed-release capsule Take 40 mg by mouth every morning before breakfast.      methocarbamol (ROBAXIN) 500 MG Tab Take 1 Tablet by mouth 4 times a day as needed (muscle spasms. don't take within 8 hours of taking flexeril). 90 Tablet 2    albuterol 108 (90 Base) MCG/ACT Aero Soln inhalation aerosol Inhale 2 Puffs every four hours as needed for Shortness of Breath. 1 Each 0    vitamin D3 (CHOLECALCIFEROL) 1000 Unit (25 mcg) Tab Take 2,000 Units by mouth every day.      Omega-3 Fatty Acids (OMEGA-3 FISH OIL PO) Take 1 Tablet by mouth every day. (Patient not taking: Reported on 3/24/2025)       No current facility-administered medications for this visit.  "      Social History     Tobacco Use    Smoking status: Former     Current packs/day: 0.00     Average packs/day: 0.3 packs/day for 25.0 years (6.3 ttl pk-yrs)     Types: Cigarettes     Start date: 1977     Quit date: 2002     Years since quittin.7    Smokeless tobacco: Never   Vaping Use    Vaping status: Never Used   Substance Use Topics    Alcohol use: Yes     Comment: occ    Drug use: Yes     Types: Marijuana     Comment: smoking and edibles daily for pain occ       Family History   Problem Relation Age of Onset    Arthritis Mother     Stroke Mother     Sleep Apnea Mother     Sleep Apnea Brother     Cancer Maternal Uncle        Review of Systems   Constitutional:  Negative for malaise/fatigue.   HENT: Negative.     Eyes: Negative.    Respiratory: Negative.     Cardiovascular:         See HPI   Gastrointestinal: Negative.    Genitourinary: Negative.    Musculoskeletal:         See HPI   Skin: Negative.    Neurological: Negative.    Endo/Heme/Allergies: Negative.    Psychiatric/Behavioral: Negative.         Exam:  /79 (BP Location: Left arm, Patient Position: Sitting, BP Cuff Size: Adult)   Pulse 67   Temp 36.5 °C (97.7 °F) (Temporal)   Ht 1.676 m (5' 6\")   Wt 112 kg (248 lb)   SpO2 95%  Body mass index is 40.03 kg/m².    Constitutional:  Not in acute distress, well appearing.  HEENT:   NC/AT  Cardiovascular: Regular rate and rhythm. No murmurs or gallops.  No point tenderness on chest      Lungs:   Clear to auscultation bilaterally. No wheezes or crackles. No respiratory distress.  Abdomen: Not distended, soft, not tender. No guarding or rigidity. No masses.  Extremities:  No cyanosis/clubbing/edema. No obvious deformities.  Skin:  Warm and dry.  No visible rashes.  Neurologic: Alert & oriented x 3,  strength and sensation grossly intact.  No focal deficits noted.  Psychiatric:  Affect normal, mood normal, judgment normal.    Assessment/Plan:     Chest tightness  -Broad differentials " including musculoskeletal pain, GERD,( patient on PPI, though might need additional pharmacotherapy for hyperacidity symptoms), need to rule out angina/atypical anginal equivalent concerning for ACS given patient's age and comorbidities cardiovascular risk factors.  -Discussed with patient need for EKG on the clinic today however patient states that she would rather have an EKG on next visit.  Discussed with patient to immediately go to the emergency department if chest tightness has become persistent, greater than 3-5 minutes, not easily relieved, shortness of breath, among other symptoms discussed.  Patient in understanding  -States that she will make an appointment next visit specifically to address this.  Patient thinks that there might be component of anxiety related to this.      2.  Neuropathy, localized  -Neuropathy localized to fourth digit of the right hand  -Patient's symptoms not corresponding to any specific nerve distribution, suspect likely related to overuse syndrome with patient's work  -Unlikely secondary to vitamin deficiency, however as patient's symptoms causing difficulty for her to perform her work, we will order appropriate workup.  Will also check for electrolyte abnormalities, including magnesium  -Will follow-up on next visit and assess need for other treatment plan    3.  Dyslipidemia  -Reviewed lipid panel from 2/18/2025: Cholesterol 237, triglycerides 97, HDL 53, LDL of 165  -The 10-year ASCVD risk score (Vicente DK, et al., 2019) is: 4.2%    Values used to calculate the score:      Age: 64 years-      Sex: Female      Is Non- : No      Diabetic: No      Tobacco smoker: No      Systolic Blood Pressure: 121 mmHg      Is BP treated: No      HDL Cholesterol: 53 mg/dL      Total Cholesterol: 237 mg/dL  -Discussed referral to nutrition, start lifestyle modifications  -Appropriate pharmacotherapy as appropriate after above interventions does not offer any improved    4.   Chronic hip pain, right        Chronic back pain  -Discussed imaging consistent with degenerative disease.  No fractures or other complications noted on MRI, no osteonecrosis or other concerning pathology evident  -Patient to make an appointment with physical therapy for which referral has been done previously  -Counseled, advised to continue application of hot compress and other management previously discussed    5.  Obesity  -BMI of 40.03  -Referral to nutrition services placed  -Discussed lifestyle modifications.  Patient eating healthy diet and tries to have regular exercise  -Prescribed with Tirzepatide , start with 2.5 mg weekly and uptitrate accordingly.  Hoping for approval from insurance as patient with significant cardiovascular risk factors and other interventions done.  -Further discussed need for referral to bariatric surgery as appropriate on subsequent visits    All imaging results and lab results and consult notes are reviewed at this visit.  Followup: Return in about 4 weeks (around 4/21/2025).    Please note that this dictation was created using voice recognition software. I have made every reasonable attempt to correct obvious errors, but I expect that there are errors of grammar and possibly content that I did not discover before finalizing the note.    ZACKARY OTOOLE MD  PGY-3

## 2025-03-25 ASSESSMENT — ENCOUNTER SYMPTOMS
PSYCHIATRIC NEGATIVE: 1
NEUROLOGICAL NEGATIVE: 1
GASTROINTESTINAL NEGATIVE: 1
RESPIRATORY NEGATIVE: 1
EYES NEGATIVE: 1

## 2025-03-26 ENCOUNTER — TELEPHONE (OUTPATIENT)
Dept: INTERNAL MEDICINE | Facility: OTHER | Age: 65
End: 2025-03-26
Payer: COMMERCIAL

## 2025-03-26 NOTE — TELEPHONE ENCOUNTER
DOCUMENTATION OF PAR STATUS:    1. Name of Medication & Dose: Mounjaro 2.5 mg/0.5 ml q 7 days     2. Name of Prescription Coverage Company & phone #: [email or web address] [phone]    3. Date Prior Auth Submitted: 03/26/2025    4. What information was given to obtain insurance decision? Notes-labs    5. Prior Auth Letter  Denied. Denial scan in media for review.    6. Action Taken: Pharmacy/Patient Notified: Yes ticket number [id number].

## 2025-03-27 NOTE — Clinical Note
REFERRAL APPROVAL NOTICE         Sent on March 27, 2025                   Laverne Riojas  [address]  [place] [address]                   Dear Ms. Riojas,    After a careful review of the medical information and benefit coverage, [facility] has processed your referral. See below for additional details.    If applicable, you must be actively enrolled with your insurance for coverage of the authorized service. If you have any questions regarding your coverage, please contact your insurance directly.    REFERRAL INFORMATION   Referral #:  [id number]  Referred-To Department    Referred-By Provider:  Ingrid Matute M.D.   [facility]      [address] [address]  [phone] [address] [id number] [phone]    Referral Start Date:  03/24/2025  Referral End Date:   03/24/2026             SCHEDULING  If you do not already have an appointment, please call [phone] to make an appointment.     MORE INFORMATION  If you do not already have a [email or web address] account, sign up at: [email or web address]  You can access your medical information, make appointments, see lab results, billing information, and more.  If you have questions regarding this referral, please contact  the [facility] Referrals department at:             [phone]. Monday - Friday 8:00AM - 5:00PM.     Sincerely,    [facility]

## 2025-03-27 NOTE — TELEPHONE ENCOUNTER
Phone Number Called: [phone]    Call outcome: Spoke to patient regarding message below.    Message: Spoke to patient she is asking for status on Zepbound for weight loss.

## 2025-03-28 NOTE — TELEPHONE ENCOUNTER
Calling patient right now , forwarded to voicemail.  Brief secured voice message done.     Pending approval from patient's insurance.  Will let her know next week for new updates.  Prior authorization request filled up.

## 2025-04-18 ENCOUNTER — HOSPITAL ENCOUNTER (OUTPATIENT)
Dept: LAB | Facility: MEDICAL CENTER | Age: 65
End: 2025-04-18
Payer: COMMERCIAL

## 2025-04-18 DIAGNOSIS — G62.9 NEUROPATHY: ICD-10-CM

## 2025-04-18 PROCEDURE — 83735 ASSAY OF MAGNESIUM: CPT

## 2025-04-18 PROCEDURE — 36415 COLL VENOUS BLD VENIPUNCTURE: CPT

## 2025-04-18 PROCEDURE — 82607 VITAMIN B-12: CPT

## 2025-04-18 PROCEDURE — 82306 VITAMIN D 25 HYDROXY: CPT

## 2025-04-19 LAB
25(OH)D3 SERPL-MCNC: 31 NG/ML (ref 30–100)
MAGNESIUM SERPL-MCNC: 2 MG/DL (ref 1.5–2.5)
VIT B12 SERPL-MCNC: 567 PG/ML (ref 211–911)

## 2025-05-05 ENCOUNTER — OFFICE VISIT (OUTPATIENT)
Dept: INTERNAL MEDICINE | Facility: OTHER | Age: 65
End: 2025-05-05
Payer: COMMERCIAL

## 2025-05-05 VITALS
HEIGHT: 66 IN | DIASTOLIC BLOOD PRESSURE: 80 MMHG | WEIGHT: 243.6 LBS | TEMPERATURE: 96.9 F | OXYGEN SATURATION: 95 % | HEART RATE: 61 BPM | SYSTOLIC BLOOD PRESSURE: 120 MMHG | BODY MASS INDEX: 39.15 KG/M2

## 2025-05-05 DIAGNOSIS — E66.9 OBESITY (BMI 35.0-39.9 WITHOUT COMORBIDITY): ICD-10-CM

## 2025-05-05 DIAGNOSIS — K21.9 CHRONIC GERD: ICD-10-CM

## 2025-05-05 DIAGNOSIS — I49.9 IRREGULAR HEART BEAT: ICD-10-CM

## 2025-05-05 DIAGNOSIS — E78.5 DYSLIPIDEMIA: ICD-10-CM

## 2025-05-05 PROBLEM — M79.671 RIGHT FOOT PAIN: Status: RESOLVED | Noted: 2022-01-30 | Resolved: 2025-05-05

## 2025-05-05 PROBLEM — M25.551 HIP PAIN, RIGHT: Status: RESOLVED | Noted: 2023-01-10 | Resolved: 2025-05-05

## 2025-05-05 PROCEDURE — 3074F SYST BP LT 130 MM HG: CPT | Mod: GE

## 2025-05-05 PROCEDURE — 3078F DIAST BP <80 MM HG: CPT | Mod: GE

## 2025-05-05 PROCEDURE — 99213 OFFICE O/P EST LOW 20 MIN: CPT | Mod: GE

## 2025-05-05 ASSESSMENT — ENCOUNTER SYMPTOMS
EYES NEGATIVE: 1
PSYCHIATRIC NEGATIVE: 1
MUSCULOSKELETAL NEGATIVE: 1
GASTROINTESTINAL NEGATIVE: 1
NEUROLOGICAL NEGATIVE: 1
RESPIRATORY NEGATIVE: 1

## 2025-05-05 ASSESSMENT — FIBROSIS 4 INDEX: FIB4 SCORE: 1.19

## 2025-05-05 NOTE — PATIENT INSTRUCTIONS
Please follow up with new provider in 3-4 months  Please do lipid panel around July 2025  Please make appointment with nutrition

## 2025-05-05 NOTE — PROGRESS NOTES
Chief Complaint   Patient presents with    Follow-Up     4 week follow up       HISTORY OF PRESENT ILLNESS:     Patient is a pleasant 64 y.o. female established patient with past medical history of chronic back pain and right hip pain, history of GERD, diverticulitis follows with gastroenterology, obesity with BMI of 40.35, dyslipidemia, history of vitamin D deficiency, on daily vitamin D supplementation     Patient is here today for follow-up. The following were discussed as noted below:     1. Irregular heart beat  Patient states that she has had sensation of irregular heartbeat for about 2 years now.  States that this happens randomly, at rest or at work.  She notes that episodes happen more frequently, sometimes about 5 times a day.  She would have this sensation which would last for minutes and spontaneously resolved.  Patient denies any history of chest pain or shortness of breath.  During last visit, patient states that she had episode of chest tightness which she does not currently have.  During last visit, patient did not want to do EKG. Previous EKG reviewed last 2023 and 2020, normal sinus rhythm with first-degree AV block.  She has had no history of syncope, dizziness or lightheadedness.     2. Dyslipidemia  -Reviewed lipid panel from 2/18/2025: Cholesterol 237, triglycerides 97, HDL 53, LDL of 165 .  ASCVD score calculated at 4.2%.  She was previously referred to nutrition last visit but has not yet made an appointment.  She has been eating healthy and tries to have regular exercise    3. Obesity (BMI 35.0-39.9 without comorbidity)  - BMI of 39.32 from 40 last visit (March 2025).  Patient requested to be prescribed with Zepbound however this was denied by insurance.  Other alternatives also not covered by insurance.  Her hemoglobin A1c last February 2025 was at 5.4%.  States that she will make an appointment with nutrition.  Patient states that she was advised by her therapist to take Ivermectin and  Fenbendazole which has been helping her with her food cravings.  She has no current or history of parasitic infection.  Patient states that she has no side effects from this, states that she was advised to take this for a total of about 3 months and would like to continue as she thinks that it is helping her    4. Chronic GERD  Follows with GI.  She has an upcoming endoscopy scheduled around July 2025.  States that she had endoscopy which she remembers that she had 1 year ago and was told that she had some changes consistent with Ferguson's esophagus.  Previous endoscopy prior to that was negative.  She was recently seen by gastroenterology and was recommended to continue taking esomeprazole 40 mg daily.  Her symptoms are well-controlled on PPI with no other current red flag signs such as weight loss, bloody stool, no evidence of iron deficiency anemia    Past Medical History:   Diagnosis Date    Chronic fatigue 05/09/2023    Depression     Hemangioma of skin 01/30/2022    Hip pain, right 01/10/2023    Marijuana use 07/14/2017    Moderate episode of recurrent major depressive disorder (HCC) 07/14/2017    Obesity     Postural dizziness with presyncope 02/01/2023    Reactive airway disease 12/19/2022    Retinal detachment, right 08/01/2023    Right foot pain 01/30/2022    Snoring 07/14/2017       Patient Active Problem List    Diagnosis Date Noted    Gastroesophageal reflux disease without esophagitis 03/14/2023    Diverticulitis 02/23/2023    Chronic bilateral back pain 02/23/2023    Hypercholesteremia 02/02/2023    Obesity (BMI 30-39.9) 01/28/2022       Patient has no known allergies.    Current Outpatient Medications   Medication Sig Dispense Refill    esomeprazole (NEXIUM) 40 MG delayed-release capsule Take 40 mg by mouth every morning before breakfast.      methocarbamol (ROBAXIN) 500 MG Tab Take 1 Tablet by mouth 4 times a day as needed (muscle spasms. don't take within 8 hours of taking flexeril). 90 Tablet 2     "albuterol 108 (90 Base) MCG/ACT Aero Soln inhalation aerosol Inhale 2 Puffs every four hours as needed for Shortness of Breath. 1 Each 0    vitamin D3 (CHOLECALCIFEROL) 1000 Unit (25 mcg) Tab Take 2,000 Units by mouth every day.      Tirzepatide (MOUNJARO) 2.5 MG/0.5ML Solution Auto-injector Inject 2.5 mg under the skin every 7 days. (Patient not taking: Reported on 2025) 3 mL 1     No current facility-administered medications for this visit.       Social History     Tobacco Use    Smoking status: Former     Current packs/day: 0.00     Average packs/day: 0.3 packs/day for 25.0 years (6.3 ttl pk-yrs)     Types: Cigarettes     Start date: 1977     Quit date: 2002     Years since quittin.8    Smokeless tobacco: Never   Vaping Use    Vaping status: Never Used   Substance Use Topics    Alcohol use: Yes     Comment: occ    Drug use: Yes     Types: Marijuana     Comment: smoking and edibles daily for pain occ       Family History   Problem Relation Age of Onset    Arthritis Mother     Stroke Mother     Sleep Apnea Mother     Sleep Apnea Brother     Cancer Maternal Uncle        Review of Systems   Constitutional:  Negative for malaise/fatigue.   HENT: Negative.     Eyes: Negative.    Respiratory: Negative.     Cardiovascular:         See HPI   Gastrointestinal: Negative.    Genitourinary: Negative.    Musculoskeletal: Negative.    Skin: Negative.    Neurological: Negative.    Endo/Heme/Allergies: Negative.    Psychiatric/Behavioral: Negative.         Exam:  /80   Pulse 61   Temp 36.1 °C (96.9 °F) (Temporal)   Ht 1.676 m (5' 6\")   Wt 110 kg (243 lb 9.6 oz)   SpO2 95%  Body mass index is 39.32 kg/m².    Constitutional:  Not in acute distress, well appearing.  HEENT:   NC/AT  Cardiovascular: Regular rate and rhythm. No murmurs or gallops.      Lungs:   Clear to auscultation bilaterally. No wheezes or crackles. No respiratory distress.  Abdomen: Not distended, soft, not tender. No guarding or " rigidity. No masses.  Extremities:  No cyanosis/clubbing/edema. No obvious deformities.  Skin:  Warm and dry.  No visible rashes.  Neurologic: Alert & oriented x 3, CN II-XII grossly intact, strength and sensation grossly intact.  No focal deficits noted.  Psychiatric:  Affect normal, mood normal, judgment normal.    Assessment/Plan:     1. Irregular heart beat  -Previous EKG last 2023 showing first-degree AV block.  Patient is currently asymptomatic and she has had no previous or current history of syncope, lightheadedness or dizziness  -Patient declined to do EKG during this clinic visit.  States that she is currently asymptomatic and not having sensation that she has mentioned as above.  Discussed importance of EKG and patient in understanding.  Also discussed that she would benefit with Zio patch monitoring for which she has amenable  -Extensively discussed symptoms to monitor that warrant immediate consult/ED visit and patient in understanding  - [facility] ZIO PATCH MONITOR; Future for 14-day monitoring  - Referral to cardiology as appropriate, medications reviewed and not on any AV pk blocking agents.  Patient hemodynamically stable, pulse rate of 61. No current need for pacemaker but needs close monitoring and follow-up    2. Dyslipidemia  -Advised to continue lifestyle modifications and reinforced nutrition appointment  - Lipid Profile; Future.  Repeat lipid panel around July 2025  -No current indication for pharmacotherapy    3. Obesity (BMI 35.0-39.9 without comorbidity)  -Reinforced to make an appointment with nutrition  -Continue lifestyle modifications  -Discuss cautious use of ivermectin and fenbendazole as there are no studies citing its efficacy and safety.  Patient states that she has had no side effects and would like to continue for now.  She will discontinue taking if with any noted adverse effect    4. Chronic GERD  -Continue GI follow-up, continue PPI.  Scheduled for repeat endoscopy. Currently no  red flag signs    5.  History of neuropathy, fourth digit of right hand  -Discussed last visit 3/24/25.  No motor or sensory deficits, all workup negative, including normal vitamin B12 and magnesium.  No focal deficit and no other neurological complaints.  No alcohol use. Resolved, no current complaint.    All imaging results and lab results and consult notes are reviewed at this visit.  Followup: Return in about 1 month (around 6/5/2025).    Patient to reestablish care with new provider    Please note that this dictation was created using voice recognition software. I have made every reasonable attempt to correct obvious errors, but I expect that there are errors of grammar and possibly content that I did not discover before finalizing the note.    ZACKARY OTOOLE MD  PGY-3

## (undated) DEVICE — FILM CASSETTE ENDO

## (undated) DEVICE — MANIFOLD NEPTUNE 1 PORT (20/PK)

## (undated) DEVICE — TUBING CLEARLINK DUO-VENT - C-FLO (48EA/CA)

## (undated) DEVICE — WATER IRRIGATION STERILE 1000ML (12EA/CA)

## (undated) DEVICE — CANISTER SUCTION RIGID RED 1500CC (40EA/CA)

## (undated) DEVICE — TUBE CONNECTING SUCTION - CLEAR PLASTIC STERILE 72 IN (50EA/CA)

## (undated) DEVICE — CANNULA O2 COMFORT SOFT EAR ADULT 7 FT TUBING (50/CA)

## (undated) DEVICE — MASK PANORAMIC OXYGEN PRO2 (30EA/CA)

## (undated) DEVICE — SET EXTENSION WITH 2 PORTS (48EA/CA) ***PART #2C8610 IS A SUBSTITUTE*****

## (undated) DEVICE — SENSOR OXIMETER ADULT SPO2 RD SET (20EA/BX)

## (undated) DEVICE — TOWEL STOP TIMEOUT SAFETY FLAG (40EA/CA)

## (undated) DEVICE — KIT CUSTOM PROCEDURE SINGLE FOR ENDO  (15/CA)

## (undated) DEVICE — ELECTRODE 850 FOAM ADHESIVE - HYDROGEL RADIOTRNSPRNT (50/PK)

## (undated) DEVICE — BITE BLOCK, DISP.

## (undated) DEVICE — SET LEADWIRE 5 LEAD BEDSIDE DISPOSABLE ECG (1SET OF 5/EA)